# Patient Record
Sex: MALE | Race: WHITE | NOT HISPANIC OR LATINO | Employment: OTHER | URBAN - METROPOLITAN AREA
[De-identification: names, ages, dates, MRNs, and addresses within clinical notes are randomized per-mention and may not be internally consistent; named-entity substitution may affect disease eponyms.]

---

## 2022-01-27 ENCOUNTER — OFFICE VISIT (OUTPATIENT)
Dept: CARDIAC SURGERY | Facility: CLINIC | Age: 57
End: 2022-01-27

## 2022-01-27 VITALS
TEMPERATURE: 98.2 F | HEIGHT: 70 IN | DIASTOLIC BLOOD PRESSURE: 80 MMHG | RESPIRATION RATE: 20 BRPM | HEART RATE: 82 BPM | BODY MASS INDEX: 19.9 KG/M2 | SYSTOLIC BLOOD PRESSURE: 128 MMHG | OXYGEN SATURATION: 100 % | WEIGHT: 139 LBS

## 2022-01-27 DIAGNOSIS — G80.9 CEREBRAL PALSY, UNSPECIFIED TYPE: ICD-10-CM

## 2022-01-27 DIAGNOSIS — I20.8 CHRONIC STABLE ANGINA: ICD-10-CM

## 2022-01-27 DIAGNOSIS — I25.10 CORONARY ARTERY DISEASE INVOLVING NATIVE CORONARY ARTERY OF NATIVE HEART WITHOUT ANGINA PECTORIS: ICD-10-CM

## 2022-01-27 DIAGNOSIS — Z79.4 TYPE 2 DIABETES MELLITUS WITH DIABETIC PERIPHERAL ANGIOPATHY WITHOUT GANGRENE, WITH LONG-TERM CURRENT USE OF INSULIN: ICD-10-CM

## 2022-01-27 DIAGNOSIS — E11.51 TYPE 2 DIABETES MELLITUS WITH DIABETIC PERIPHERAL ANGIOPATHY WITHOUT GANGRENE, WITH LONG-TERM CURRENT USE OF INSULIN: ICD-10-CM

## 2022-01-27 DIAGNOSIS — I25.5 ISCHEMIC CARDIOMYOPATHY: Primary | ICD-10-CM

## 2022-01-27 PROBLEM — I20.89 CHRONIC STABLE ANGINA: Status: ACTIVE | Noted: 2022-01-27

## 2022-01-27 PROCEDURE — 99024 POSTOP FOLLOW-UP VISIT: CPT | Performed by: THORACIC SURGERY (CARDIOTHORACIC VASCULAR SURGERY)

## 2022-01-27 NOTE — PROGRESS NOTES
Dear Shon,    I saw Mr. Morse today in the office.  He is still very weak and is weak as a kitten.  I do think he needs bypass surgery but I would like for you to see him again in 2 weeks and I will see him again in 1 month.  He supposed to have a second Covid shot next week.  I hope we can get him in shape for surgery because in the long run this would be his best option.  However it could be long-term medical treatment.  We will just have to see how he responds to therapy.    I will see him back in a month.

## 2022-02-24 ENCOUNTER — OFFICE VISIT (OUTPATIENT)
Dept: CARDIAC SURGERY | Facility: CLINIC | Age: 57
End: 2022-02-24

## 2022-02-24 ENCOUNTER — PREP FOR SURGERY (OUTPATIENT)
Dept: OTHER | Facility: HOSPITAL | Age: 57
End: 2022-02-24

## 2022-02-24 ENCOUNTER — TELEPHONE (OUTPATIENT)
Dept: CARDIAC SURGERY | Facility: CLINIC | Age: 57
End: 2022-02-24

## 2022-02-24 VITALS
RESPIRATION RATE: 17 BRPM | SYSTOLIC BLOOD PRESSURE: 133 MMHG | BODY MASS INDEX: 20.76 KG/M2 | HEIGHT: 70 IN | WEIGHT: 145 LBS | TEMPERATURE: 97.8 F | HEART RATE: 92 BPM | OXYGEN SATURATION: 100 % | DIASTOLIC BLOOD PRESSURE: 74 MMHG

## 2022-02-24 DIAGNOSIS — I11.0 HYPERTENSIVE HEART DISEASE WITH HEART FAILURE: ICD-10-CM

## 2022-02-24 DIAGNOSIS — R79.9 ABNORMAL FINDING OF BLOOD CHEMISTRY, UNSPECIFIED: ICD-10-CM

## 2022-02-24 DIAGNOSIS — R79.1 ABNORMAL COAGULATION PROFILE: ICD-10-CM

## 2022-02-24 DIAGNOSIS — I63.89 OTHER CEREBRAL INFARCTION: ICD-10-CM

## 2022-02-24 DIAGNOSIS — I25.5 ISCHEMIC CARDIOMYOPATHY: Primary | ICD-10-CM

## 2022-02-24 DIAGNOSIS — I25.118 CORONARY ARTERY DISEASE OF NATIVE HEART WITH STABLE ANGINA PECTORIS, UNSPECIFIED VESSEL OR LESION TYPE: Primary | ICD-10-CM

## 2022-02-24 PROCEDURE — 99024 POSTOP FOLLOW-UP VISIT: CPT | Performed by: THORACIC SURGERY (CARDIOTHORACIC VASCULAR SURGERY)

## 2022-02-24 RX ORDER — CHLORHEXIDINE GLUCONATE 500 MG/1
1 CLOTH TOPICAL EVERY 12 HOURS PRN
Status: CANCELLED | OUTPATIENT
Start: 2022-02-24

## 2022-02-24 RX ORDER — CHLORHEXIDINE GLUCONATE 0.12 MG/ML
15 RINSE ORAL ONCE
Status: CANCELLED | OUTPATIENT
Start: 2022-02-24 | End: 2022-02-24

## 2022-02-24 RX ORDER — CEFAZOLIN SODIUM 2 G/100ML
2 INJECTION, SOLUTION INTRAVENOUS
Status: CANCELLED | OUTPATIENT
Start: 2022-02-25 | End: 2022-02-26

## 2022-02-24 RX ORDER — CHLORHEXIDINE GLUCONATE 0.12 MG/ML
15 RINSE ORAL EVERY 12 HOURS
Status: CANCELLED | OUTPATIENT
Start: 2022-02-24 | End: 2022-02-25

## 2022-02-24 NOTE — PROGRESS NOTES
I saw him today back in the office.  He has gained weight and is now near his baseline 145 pounds.  He has been exercising on the bicycle.  I think he is now ready for surgery and we will set this up for next week.  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  He had it when he was younger with some improvement.  He has a nonhealing ulcer on his right foot and I am going to have vascular see him.  We will plan for surgery next week.

## 2022-02-24 NOTE — TELEPHONE ENCOUNTER
Spoke to Gayla with PAT and surgery times. PAT is on 2- at 0815. Surgery on 3-2-2022 at 0730 arrival time is 0500. Expressed understanding.

## 2022-02-28 ENCOUNTER — HOSPITAL ENCOUNTER (OUTPATIENT)
Dept: GENERAL RADIOLOGY | Facility: HOSPITAL | Age: 57
Discharge: HOME | End: 2022-02-28

## 2022-02-28 ENCOUNTER — PRE-ADMISSION TESTING (OUTPATIENT)
Dept: PREADMISSION TESTING | Facility: HOSPITAL | Age: 57
End: 2022-02-28

## 2022-02-28 ENCOUNTER — HOSPITAL ENCOUNTER (OUTPATIENT)
Dept: CARDIOLOGY | Facility: HOSPITAL | Age: 57
Discharge: HOME | End: 2022-02-28

## 2022-02-28 ENCOUNTER — ANESTHESIA EVENT (OUTPATIENT)
Dept: PERIOP | Facility: HOSPITAL | Age: 57
End: 2022-02-28

## 2022-02-28 VITALS
DIASTOLIC BLOOD PRESSURE: 80 MMHG | HEART RATE: 82 BPM | HEIGHT: 70 IN | OXYGEN SATURATION: 100 % | BODY MASS INDEX: 20.33 KG/M2 | SYSTOLIC BLOOD PRESSURE: 135 MMHG | RESPIRATION RATE: 16 BRPM | WEIGHT: 142 LBS | TEMPERATURE: 96.6 F

## 2022-02-28 DIAGNOSIS — I25.118 CORONARY ARTERY DISEASE OF NATIVE HEART WITH STABLE ANGINA PECTORIS, UNSPECIFIED VESSEL OR LESION TYPE: ICD-10-CM

## 2022-02-28 DIAGNOSIS — I11.0 HYPERTENSIVE HEART DISEASE WITH HEART FAILURE: ICD-10-CM

## 2022-02-28 DIAGNOSIS — I63.89 OTHER CEREBRAL INFARCTION: ICD-10-CM

## 2022-02-28 DIAGNOSIS — R79.9 ABNORMAL FINDING OF BLOOD CHEMISTRY, UNSPECIFIED: ICD-10-CM

## 2022-02-28 DIAGNOSIS — R79.1 ABNORMAL COAGULATION PROFILE: ICD-10-CM

## 2022-02-28 LAB
ABO GROUP BLD: NORMAL
ALBUMIN SERPL-MCNC: 3.7 G/DL (ref 3.5–5.2)
ALBUMIN/GLOB SERPL: 1 G/DL
ALP SERPL-CCNC: 85 U/L (ref 39–117)
ALT SERPL W P-5'-P-CCNC: 15 U/L (ref 1–41)
ANION GAP SERPL CALCULATED.3IONS-SCNC: 10.1 MMOL/L (ref 5–15)
APTT PPP: 27.4 SECONDS (ref 22.7–35.4)
ARTERIAL PATENCY WRIST A: ABNORMAL
AST SERPL-CCNC: 10 U/L (ref 1–40)
ATMOSPHERIC PRESS: 761 MMHG
BACTERIA UR QL AUTO: ABNORMAL /HPF
BASE EXCESS BLDA CALC-SCNC: -1.4 MMOL/L (ref 0–2)
BASOPHILS # BLD AUTO: 0.1 10*3/MM3 (ref 0–0.2)
BASOPHILS NFR BLD AUTO: 1.1 % (ref 0–1.5)
BDY SITE: ABNORMAL
BH CV XLRA MEAS - DIST GSV CALF DIST LEFT: 0.23 CM
BH CV XLRA MEAS - DIST GSV CALF DIST RIGHT: 0.37 CM
BH CV XLRA MEAS - DIST GSV THIGH DIST LEFT: 0.24 CM
BH CV XLRA MEAS - DIST GSV THIGH DIST RIGHT: 0.37 CM
BH CV XLRA MEAS - GSV ANKLE DIST LEFT: 0.22 CM
BH CV XLRA MEAS - GSV ANKLE DIST RIGHT: 0.43 CM
BH CV XLRA MEAS - GSV KNEE DIST LEFT: 0.28 CM
BH CV XLRA MEAS - GSV KNEE DIST RIGHT: 0.34 CM
BH CV XLRA MEAS - GSV ORIGIN DIST LEFT: 0.32 CM
BH CV XLRA MEAS - GSV ORIGIN DIST RIGHT: 0.47 CM
BH CV XLRA MEAS - MID GSV CALF LEFT: 0.21 CM
BH CV XLRA MEAS - MID GSV CALF RIGHT: 0.39 CM
BH CV XLRA MEAS - MID GSV THIGH  LEFT: 0.27 CM
BH CV XLRA MEAS - MID GSV THIGH  RIGHT: 0.4 CM
BH CV XLRA MEAS - PROX GSV CALF DIST LEFT: 0.24 CM
BH CV XLRA MEAS - PROX GSV CALF DIST RIGHT: 0.38 CM
BH CV XLRA MEAS - PROX GSV THIGH  LEFT: 0.32 CM
BH CV XLRA MEAS - PROX GSV THIGH  RIGHT: 0.39 CM
BH CV XLRA MEAS LEFT DIST CCA EDV: -25.2 CM/SEC
BH CV XLRA MEAS LEFT DIST CCA PSV: -97.3 CM/SEC
BH CV XLRA MEAS LEFT DIST ICA EDV: -28.1 CM/SEC
BH CV XLRA MEAS LEFT DIST ICA PSV: -83.3 CM/SEC
BH CV XLRA MEAS LEFT ICA/CCA RATIO: 1.23
BH CV XLRA MEAS LEFT MID ICA EDV: -36.4 CM/SEC
BH CV XLRA MEAS LEFT MID ICA PSV: -93.8 CM/SEC
BH CV XLRA MEAS LEFT PROX CCA EDV: 32.4 CM/SEC
BH CV XLRA MEAS LEFT PROX CCA PSV: 134 CM/SEC
BH CV XLRA MEAS LEFT PROX ECA EDV: -18.9 CM/SEC
BH CV XLRA MEAS LEFT PROX ECA PSV: -124 CM/SEC
BH CV XLRA MEAS LEFT PROX ICA EDV: -38.5 CM/SEC
BH CV XLRA MEAS LEFT PROX ICA PSV: -120 CM/SEC
BH CV XLRA MEAS LEFT PROX SCLA PSV: 177 CM/SEC
BH CV XLRA MEAS LEFT VERTEBRAL A EDV: 18.5 CM/SEC
BH CV XLRA MEAS LEFT VERTEBRAL A PSV: 66.8 CM/SEC
BH CV XLRA MEAS RIGHT DIST CCA EDV: 24 CM/SEC
BH CV XLRA MEAS RIGHT DIST CCA PSV: 90.9 CM/SEC
BH CV XLRA MEAS RIGHT DIST ICA EDV: -40.8 CM/SEC
BH CV XLRA MEAS RIGHT DIST ICA PSV: -110 CM/SEC
BH CV XLRA MEAS RIGHT ICA/CCA RATIO: 1.21
BH CV XLRA MEAS RIGHT MID ICA EDV: -35.4 CM/SEC
BH CV XLRA MEAS RIGHT MID ICA PSV: -109 CM/SEC
BH CV XLRA MEAS RIGHT PROX CCA EDV: 27 CM/SEC
BH CV XLRA MEAS RIGHT PROX CCA PSV: 105 CM/SEC
BH CV XLRA MEAS RIGHT PROX ECA EDV: -25.5 CM/SEC
BH CV XLRA MEAS RIGHT PROX ECA PSV: -169 CM/SEC
BH CV XLRA MEAS RIGHT PROX ICA EDV: -28.3 CM/SEC
BH CV XLRA MEAS RIGHT PROX ICA PSV: -109 CM/SEC
BH CV XLRA MEAS RIGHT PROX SCLA PSV: 140 CM/SEC
BH CV XLRA MEAS RIGHT VERTEBRAL A EDV: 9.3 CM/SEC
BH CV XLRA MEAS RIGHT VERTEBRAL A PSV: 35.9 CM/SEC
BILIRUB SERPL-MCNC: 0.3 MG/DL (ref 0–1.2)
BILIRUB UR QL STRIP: NEGATIVE
BLD GP AB SCN SERPL QL: NEGATIVE
BUN SERPL-MCNC: 20 MG/DL (ref 6–20)
BUN/CREAT SERPL: 20.2 (ref 7–25)
CALCIUM SPEC-SCNC: 9 MG/DL (ref 8.6–10.5)
CHLORIDE SERPL-SCNC: 102 MMOL/L (ref 98–107)
CHOLEST SERPL-MCNC: 128 MG/DL (ref 0–200)
CLARITY UR: ABNORMAL
CLOSE TME COLL+ADP + EPINEP PNL BLD: 97 % (ref 86–100)
CO2 SERPL-SCNC: 23.9 MMOL/L (ref 22–29)
COLOR UR: YELLOW
CREAT SERPL-MCNC: 0.99 MG/DL (ref 0.76–1.27)
DEPRECATED RDW RBC AUTO: 40.3 FL (ref 37–54)
EGFRCR SERPLBLD CKD-EPI 2021: 90 ML/MIN/1.73
EOSINOPHIL # BLD AUTO: 0.17 10*3/MM3 (ref 0–0.4)
EOSINOPHIL NFR BLD AUTO: 1.8 % (ref 0.3–6.2)
ERYTHROCYTE [DISTWIDTH] IN BLOOD BY AUTOMATED COUNT: 12.7 % (ref 12.3–15.4)
GLOBULIN UR ELPH-MCNC: 3.7 GM/DL
GLUCOSE SERPL-MCNC: 173 MG/DL (ref 65–99)
GLUCOSE UR STRIP-MCNC: NEGATIVE MG/DL
HBA1C MFR BLD: 8.6 % (ref 4.8–5.6)
HCO3 BLDA-SCNC: 22 MMOL/L (ref 22–28)
HCT VFR BLD AUTO: 30.7 % (ref 37.5–51)
HDLC SERPL-MCNC: 39 MG/DL (ref 40–60)
HGB BLD-MCNC: 10.4 G/DL (ref 13–17.7)
HGB UR QL STRIP.AUTO: ABNORMAL
HYALINE CASTS UR QL AUTO: ABNORMAL /LPF
IMM GRANULOCYTES # BLD AUTO: 0.03 10*3/MM3 (ref 0–0.05)
IMM GRANULOCYTES NFR BLD AUTO: 0.3 % (ref 0–0.5)
INR PPP: 0.97 (ref 0.9–1.1)
KETONES UR QL STRIP: NEGATIVE
LDLC SERPL CALC-MCNC: 72 MG/DL (ref 0–100)
LDLC/HDLC SERPL: 1.84 {RATIO}
LEFT ARM BP: NORMAL MMHG
LEUKOCYTE ESTERASE UR QL STRIP.AUTO: ABNORMAL
LYMPHOCYTES # BLD AUTO: 1.89 10*3/MM3 (ref 0.7–3.1)
LYMPHOCYTES NFR BLD AUTO: 20.1 % (ref 19.6–45.3)
MAGNESIUM SERPL-MCNC: 2.3 MG/DL (ref 1.6–2.6)
MAXIMAL PREDICTED HEART RATE: 165 BPM
MAXIMAL PREDICTED HEART RATE: 165 BPM
MCH RBC QN AUTO: 30 PG (ref 26.6–33)
MCHC RBC AUTO-ENTMCNC: 33.9 G/DL (ref 31.5–35.7)
MCV RBC AUTO: 88.5 FL (ref 79–97)
MODALITY: ABNORMAL
MONOCYTES # BLD AUTO: 0.62 10*3/MM3 (ref 0.1–0.9)
MONOCYTES NFR BLD AUTO: 6.6 % (ref 5–12)
NEUTROPHILS NFR BLD AUTO: 6.57 10*3/MM3 (ref 1.7–7)
NEUTROPHILS NFR BLD AUTO: 70.1 % (ref 42.7–76)
NITRITE UR QL STRIP: NEGATIVE
NRBC BLD AUTO-RTO: 0 /100 WBC (ref 0–0.2)
NT-PROBNP SERPL-MCNC: 91.6 PG/ML (ref 0–900)
PCO2 BLDA: 31.4 MM HG (ref 35–45)
PH BLDA: 7.45 PH UNITS (ref 7.35–7.45)
PH UR STRIP.AUTO: <=5 [PH] (ref 5–8)
PLATELET # BLD AUTO: 371 10*3/MM3 (ref 140–450)
PMV BLD AUTO: 8.8 FL (ref 6–12)
PO2 BLDA: 83.5 MM HG (ref 80–100)
POTASSIUM SERPL-SCNC: 4.6 MMOL/L (ref 3.5–5.2)
PROT SERPL-MCNC: 7.4 G/DL (ref 6–8.5)
PROT UR QL STRIP: ABNORMAL
PROTHROMBIN TIME: 12.8 SECONDS (ref 11.7–14.2)
QT INTERVAL: 394 MS
RBC # BLD AUTO: 3.47 10*6/MM3 (ref 4.14–5.8)
RBC # UR STRIP: ABNORMAL /HPF
REF LAB TEST METHOD: ABNORMAL
RH BLD: POSITIVE
RIGHT ARM BP: NORMAL MMHG
SAO2 % BLDCOA: 96.9 % (ref 92–99)
SARS-COV-2 ORF1AB RESP QL NAA+PROBE: NOT DETECTED
SODIUM SERPL-SCNC: 136 MMOL/L (ref 136–145)
SP GR UR STRIP: 1.01 (ref 1–1.03)
SQUAMOUS #/AREA URNS HPF: ABNORMAL /HPF
STRESS TARGET HR: 140 BPM
STRESS TARGET HR: 140 BPM
T&S EXPIRATION DATE: NORMAL
TOTAL RATE: 16 BREATHS/MINUTE
TRIGL SERPL-MCNC: 86 MG/DL (ref 0–150)
UROBILINOGEN UR QL STRIP: ABNORMAL
VLDLC SERPL-MCNC: 17 MG/DL (ref 5–40)
WBC # UR STRIP: ABNORMAL /HPF
WBC NRBC COR # BLD: 9.38 10*3/MM3 (ref 3.4–10.8)
YEAST URNS QL MICRO: ABNORMAL /HPF

## 2022-02-28 PROCEDURE — 87086 URINE CULTURE/COLONY COUNT: CPT

## 2022-02-28 PROCEDURE — 93880 EXTRACRANIAL BILAT STUDY: CPT

## 2022-02-28 PROCEDURE — 80053 COMPREHEN METABOLIC PANEL: CPT

## 2022-02-28 PROCEDURE — 86920 COMPATIBILITY TEST SPIN: CPT

## 2022-02-28 PROCEDURE — 80061 LIPID PANEL: CPT

## 2022-02-28 PROCEDURE — 93970 EXTREMITY STUDY: CPT

## 2022-02-28 PROCEDURE — 83036 HEMOGLOBIN GLYCOSYLATED A1C: CPT

## 2022-02-28 PROCEDURE — 71046 X-RAY EXAM CHEST 2 VIEWS: CPT

## 2022-02-28 PROCEDURE — 86900 BLOOD TYPING SEROLOGIC ABO: CPT

## 2022-02-28 PROCEDURE — 86901 BLOOD TYPING SEROLOGIC RH(D): CPT

## 2022-02-28 PROCEDURE — 85025 COMPLETE CBC W/AUTO DIFF WBC: CPT

## 2022-02-28 PROCEDURE — 83880 ASSAY OF NATRIURETIC PEPTIDE: CPT

## 2022-02-28 PROCEDURE — U0004 COV-19 TEST NON-CDC HGH THRU: HCPCS

## 2022-02-28 PROCEDURE — 85610 PROTHROMBIN TIME: CPT

## 2022-02-28 PROCEDURE — 83735 ASSAY OF MAGNESIUM: CPT

## 2022-02-28 PROCEDURE — 93010 ELECTROCARDIOGRAM REPORT: CPT | Performed by: INTERNAL MEDICINE

## 2022-02-28 PROCEDURE — 82803 BLOOD GASES ANY COMBINATION: CPT | Performed by: INTERNAL MEDICINE

## 2022-02-28 PROCEDURE — 81001 URINALYSIS AUTO W/SCOPE: CPT

## 2022-02-28 PROCEDURE — 86850 RBC ANTIBODY SCREEN: CPT

## 2022-02-28 PROCEDURE — 36415 COLL VENOUS BLD VENIPUNCTURE: CPT

## 2022-02-28 PROCEDURE — C9803 HOPD COVID-19 SPEC COLLECT: HCPCS

## 2022-02-28 PROCEDURE — 36600 WITHDRAWAL OF ARTERIAL BLOOD: CPT | Performed by: INTERNAL MEDICINE

## 2022-02-28 PROCEDURE — 85576 BLOOD PLATELET AGGREGATION: CPT

## 2022-02-28 PROCEDURE — 93005 ELECTROCARDIOGRAM TRACING: CPT

## 2022-02-28 PROCEDURE — 94799 UNLISTED PULMONARY SVC/PX: CPT

## 2022-02-28 PROCEDURE — 85730 THROMBOPLASTIN TIME PARTIAL: CPT

## 2022-02-28 RX ORDER — AMLODIPINE BESYLATE 5 MG/1
5 TABLET ORAL NIGHTLY
COMMUNITY
End: 2022-03-07 | Stop reason: HOSPADM

## 2022-02-28 RX ORDER — FLUOXETINE HYDROCHLORIDE 40 MG/1
40 CAPSULE ORAL DAILY
COMMUNITY
Start: 2022-02-24 | End: 2022-02-28

## 2022-02-28 RX ORDER — CHLORHEXIDINE GLUCONATE 0.12 MG/ML
15 RINSE ORAL
COMMUNITY
End: 2022-03-07 | Stop reason: HOSPADM

## 2022-02-28 RX ORDER — CHLORHEXIDINE GLUCONATE 500 MG/1
1 CLOTH TOPICAL EVERY 12 HOURS PRN
Status: ACTIVE | OUTPATIENT
Start: 2022-02-28

## 2022-02-28 RX ORDER — AMLODIPINE BESYLATE 5 MG/1
5 TABLET ORAL DAILY
COMMUNITY
Start: 2022-02-23 | End: 2022-02-28

## 2022-02-28 RX ORDER — ATORVASTATIN CALCIUM 40 MG/1
TABLET, FILM COATED ORAL
COMMUNITY
End: 2022-02-28

## 2022-02-28 RX ORDER — CARVEDILOL 6.25 MG/1
6.25 TABLET ORAL 2 TIMES DAILY WITH MEALS
COMMUNITY
Start: 2022-02-06 | End: 2022-03-07 | Stop reason: HOSPADM

## 2022-02-28 RX ORDER — AMLODIPINE BESYLATE 10 MG/1
10 TABLET ORAL NIGHTLY
COMMUNITY
End: 2022-03-07 | Stop reason: HOSPADM

## 2022-02-28 RX ORDER — INSULIN ASPART 100 [IU]/ML
10 INJECTION, SOLUTION INTRAVENOUS; SUBCUTANEOUS
COMMUNITY

## 2022-02-28 RX ORDER — INSULIN GLARGINE 100 [IU]/ML
INJECTION, SOLUTION SUBCUTANEOUS EVERY 12 HOURS SCHEDULED
COMMUNITY
End: 2022-02-28

## 2022-02-28 RX ORDER — SULFAMETHOXAZOLE AND TRIMETHOPRIM 800; 160 MG/1; MG/1
1 TABLET ORAL 2 TIMES DAILY
Qty: 6 TABLET | Refills: 0 | Status: SHIPPED | OUTPATIENT
Start: 2022-02-28 | End: 2022-03-07 | Stop reason: HOSPADM

## 2022-02-28 RX ORDER — CARVEDILOL 6.25 MG/1
TABLET ORAL EVERY 12 HOURS SCHEDULED
COMMUNITY
End: 2022-02-28

## 2022-02-28 RX ORDER — AMLODIPINE BESYLATE 5 MG/1
TABLET ORAL
COMMUNITY
End: 2022-02-28

## 2022-02-28 RX ORDER — CHLORHEXIDINE GLUCONATE 0.12 MG/ML
15 RINSE ORAL EVERY 12 HOURS
Status: DISPENSED | OUTPATIENT
Start: 2022-02-28 | End: 2022-03-01

## 2022-03-01 ENCOUNTER — TELEPHONE (OUTPATIENT)
Dept: CARDIAC SURGERY | Facility: CLINIC | Age: 57
End: 2022-03-01

## 2022-03-01 LAB — BACTERIA SPEC AEROBE CULT: ABNORMAL

## 2022-03-01 NOTE — TELEPHONE ENCOUNTER
Spoke to Gayla with surgery time change. Patient is now a to follow case so his arrival time will be 0800. Expressed understanding.

## 2022-03-02 ENCOUNTER — HOSPITAL ENCOUNTER (INPATIENT)
Facility: HOSPITAL | Age: 57
LOS: 5 days | Discharge: HOME HEALTH | End: 2022-03-07
Attending: THORACIC SURGERY (CARDIOTHORACIC VASCULAR SURGERY) | Admitting: THORACIC SURGERY (CARDIOTHORACIC VASCULAR SURGERY)

## 2022-03-02 ENCOUNTER — ANCILLARY PROCEDURE (OUTPATIENT)
Dept: PERIOP | Facility: HOSPITAL | Age: 57
End: 2022-03-02

## 2022-03-02 ENCOUNTER — ANESTHESIA (OUTPATIENT)
Dept: PERIOP | Facility: HOSPITAL | Age: 57
End: 2022-03-02

## 2022-03-02 ENCOUNTER — APPOINTMENT (OUTPATIENT)
Dept: GENERAL RADIOLOGY | Facility: HOSPITAL | Age: 57
End: 2022-03-02

## 2022-03-02 DIAGNOSIS — D58.2 ABNORMAL HEMOGLOBIN: ICD-10-CM

## 2022-03-02 DIAGNOSIS — I25.118 CORONARY ARTERY DISEASE OF NATIVE HEART WITH STABLE ANGINA PECTORIS, UNSPECIFIED VESSEL OR LESION TYPE: ICD-10-CM

## 2022-03-02 DIAGNOSIS — Z95.1 S/P CABG (CORONARY ARTERY BYPASS GRAFT): Primary | ICD-10-CM

## 2022-03-02 LAB
ACT BLD: 106 SECONDS (ref 82–152)
ACT BLD: 142 SECONDS (ref 82–152)
ACT BLD: 362 SECONDS (ref 82–152)
ACT BLD: 422 SECONDS (ref 82–152)
ACT BLD: 481 SECONDS (ref 82–152)
ACT BLD: 600 SECONDS (ref 82–152)
ALBUMIN SERPL-MCNC: 4.1 G/DL (ref 3.5–5.2)
ALBUMIN SERPL-MCNC: 4.3 G/DL (ref 3.5–5.2)
ANION GAP SERPL CALCULATED.3IONS-SCNC: 12 MMOL/L (ref 5–15)
ANION GAP SERPL CALCULATED.3IONS-SCNC: 13 MMOL/L (ref 5–15)
APTT PPP: 26.8 SECONDS (ref 22.7–35.4)
ARTERIAL PATENCY WRIST A: ABNORMAL
ATMOSPHERIC PRESS: 753.4 MMHG
ATMOSPHERIC PRESS: 753.9 MMHG
ATMOSPHERIC PRESS: 754.8 MMHG
BASE EXCESS BLDA CALC-SCNC: -1 MMOL/L (ref -5–5)
BASE EXCESS BLDA CALC-SCNC: -1 MMOL/L (ref -5–5)
BASE EXCESS BLDA CALC-SCNC: -2 MMOL/L (ref -5–5)
BASE EXCESS BLDA CALC-SCNC: -2.7 MMOL/L (ref 0–2)
BASE EXCESS BLDA CALC-SCNC: -4.3 MMOL/L (ref 0–2)
BASE EXCESS BLDA CALC-SCNC: 0 MMOL/L (ref -5–5)
BASE EXCESS BLDA CALC-SCNC: 0.6 MMOL/L (ref 0–2)
BASE EXCESS BLDA CALC-SCNC: 3 MMOL/L (ref -5–5)
BASE EXCESS BLDA CALC-SCNC: 5 MMOL/L (ref -5–5)
BASOPHILS # BLD AUTO: 0.06 10*3/MM3 (ref 0–0.2)
BASOPHILS NFR BLD AUTO: 0.4 % (ref 0–1.5)
BDY SITE: ABNORMAL
BUN SERPL-MCNC: 26 MG/DL (ref 6–20)
BUN SERPL-MCNC: 26 MG/DL (ref 6–20)
BUN/CREAT SERPL: 21.8 (ref 7–25)
BUN/CREAT SERPL: 22.8 (ref 7–25)
CA-I BLD-MCNC: 5.7 MG/DL (ref 4.6–5.4)
CA-I BLDA-SCNC: ABNORMAL MMOL/L
CA-I SERPL ISE-MCNC: 1.43 MMOL/L (ref 1.15–1.35)
CALCIUM SPEC-SCNC: 9.3 MG/DL (ref 8.6–10.5)
CALCIUM SPEC-SCNC: 9.7 MG/DL (ref 8.6–10.5)
CHLORIDE SERPL-SCNC: 108 MMOL/L (ref 98–107)
CHLORIDE SERPL-SCNC: 111 MMOL/L (ref 98–107)
CO2 BLDA-SCNC: 24 MMOL/L (ref 24–29)
CO2 BLDA-SCNC: 25 MMOL/L (ref 24–29)
CO2 BLDA-SCNC: 26 MMOL/L (ref 24–29)
CO2 BLDA-SCNC: 26 MMOL/L (ref 24–29)
CO2 BLDA-SCNC: 29 MMOL/L (ref 24–29)
CO2 BLDA-SCNC: 31 MMOL/L (ref 24–29)
CO2 SERPL-SCNC: 19 MMOL/L (ref 22–29)
CO2 SERPL-SCNC: 25 MMOL/L (ref 22–29)
CREAT SERPL-MCNC: 1.14 MG/DL (ref 0.76–1.27)
CREAT SERPL-MCNC: 1.19 MG/DL (ref 0.76–1.27)
DEPRECATED RDW RBC AUTO: 42.9 FL (ref 37–54)
DEPRECATED RDW RBC AUTO: 44.4 FL (ref 37–54)
EGFRCR SERPLBLD CKD-EPI 2021: 72.1 ML/MIN/1.73
EGFRCR SERPLBLD CKD-EPI 2021: 76 ML/MIN/1.73
EOSINOPHIL # BLD AUTO: 0.26 10*3/MM3 (ref 0–0.4)
EOSINOPHIL NFR BLD AUTO: 1.7 % (ref 0.3–6.2)
ERYTHROCYTE [DISTWIDTH] IN BLOOD BY AUTOMATED COUNT: 13.3 % (ref 12.3–15.4)
ERYTHROCYTE [DISTWIDTH] IN BLOOD BY AUTOMATED COUNT: 13.7 % (ref 12.3–15.4)
FIBRINOGEN PPP-MCNC: 327 MG/DL (ref 219–464)
GLUCOSE BLDC GLUCOMTR-MCNC: 100 MG/DL (ref 70–130)
GLUCOSE BLDC GLUCOMTR-MCNC: 101 MG/DL (ref 70–130)
GLUCOSE BLDC GLUCOMTR-MCNC: 109 MG/DL (ref 70–130)
GLUCOSE BLDC GLUCOMTR-MCNC: 115 MG/DL (ref 70–130)
GLUCOSE BLDC GLUCOMTR-MCNC: 119 MG/DL (ref 70–130)
GLUCOSE BLDC GLUCOMTR-MCNC: 127 MG/DL (ref 70–130)
GLUCOSE BLDC GLUCOMTR-MCNC: 132 MG/DL (ref 70–130)
GLUCOSE BLDC GLUCOMTR-MCNC: 140 MG/DL (ref 70–130)
GLUCOSE BLDC GLUCOMTR-MCNC: 147 MG/DL (ref 70–130)
GLUCOSE BLDC GLUCOMTR-MCNC: 162 MG/DL (ref 70–130)
GLUCOSE BLDC GLUCOMTR-MCNC: 174 MG/DL (ref 70–130)
GLUCOSE BLDC GLUCOMTR-MCNC: 195 MG/DL (ref 70–130)
GLUCOSE BLDC GLUCOMTR-MCNC: 195 MG/DL (ref 70–130)
GLUCOSE BLDC GLUCOMTR-MCNC: 54 MG/DL (ref 70–130)
GLUCOSE BLDC GLUCOMTR-MCNC: 60 MG/DL (ref 70–130)
GLUCOSE BLDC GLUCOMTR-MCNC: 70 MG/DL (ref 70–130)
GLUCOSE BLDC GLUCOMTR-MCNC: 91 MG/DL (ref 70–130)
GLUCOSE BLDC GLUCOMTR-MCNC: 94 MG/DL (ref 70–130)
GLUCOSE BLDC GLUCOMTR-MCNC: 99 MG/DL (ref 70–130)
GLUCOSE SERPL-MCNC: 123 MG/DL (ref 65–99)
GLUCOSE SERPL-MCNC: 190 MG/DL (ref 65–99)
HCO3 BLDA-SCNC: 21.3 MMOL/L (ref 22–28)
HCO3 BLDA-SCNC: 22.1 MMOL/L (ref 22–28)
HCO3 BLDA-SCNC: 23.1 MMOL/L (ref 22–26)
HCO3 BLDA-SCNC: 23.9 MMOL/L (ref 22–26)
HCO3 BLDA-SCNC: 24.4 MMOL/L (ref 22–26)
HCO3 BLDA-SCNC: 24.7 MMOL/L (ref 22–26)
HCO3 BLDA-SCNC: 26.7 MMOL/L (ref 22–28)
HCO3 BLDA-SCNC: 27.7 MMOL/L (ref 22–26)
HCO3 BLDA-SCNC: 29.8 MMOL/L (ref 22–26)
HCT VFR BLD AUTO: 30.9 % (ref 37.5–51)
HCT VFR BLD AUTO: 31.2 % (ref 37.5–51)
HCT VFR BLDA CALC: 20 % (ref 38–51)
HCT VFR BLDA CALC: 24 % (ref 38–51)
HGB BLD-MCNC: 10.2 G/DL (ref 13–17.7)
HGB BLD-MCNC: 10.8 G/DL (ref 13–17.7)
HGB BLDA-MCNC: 6.8 G/DL (ref 12–17)
HGB BLDA-MCNC: 8.2 G/DL (ref 12–17)
IMM GRANULOCYTES # BLD AUTO: 0.09 10*3/MM3 (ref 0–0.05)
IMM GRANULOCYTES NFR BLD AUTO: 0.6 % (ref 0–0.5)
INHALED O2 CONCENTRATION: 100 %
INHALED O2 CONCENTRATION: 40 %
INHALED O2 CONCENTRATION: 40 %
INR PPP: 1.24 (ref 0.9–1.1)
LYMPHOCYTES # BLD AUTO: 0.93 10*3/MM3 (ref 0.7–3.1)
LYMPHOCYTES NFR BLD AUTO: 6 % (ref 19.6–45.3)
MAGNESIUM SERPL-MCNC: 3.1 MG/DL (ref 1.6–2.6)
MAGNESIUM SERPL-MCNC: 3.5 MG/DL (ref 1.6–2.6)
MCH RBC QN AUTO: 29.7 PG (ref 26.6–33)
MCH RBC QN AUTO: 30.6 PG (ref 26.6–33)
MCHC RBC AUTO-ENTMCNC: 33 G/DL (ref 31.5–35.7)
MCHC RBC AUTO-ENTMCNC: 34.6 G/DL (ref 31.5–35.7)
MCV RBC AUTO: 88.4 FL (ref 79–97)
MCV RBC AUTO: 90.1 FL (ref 79–97)
MODALITY: ABNORMAL
MONOCYTES # BLD AUTO: 0.73 10*3/MM3 (ref 0.1–0.9)
MONOCYTES NFR BLD AUTO: 4.7 % (ref 5–12)
NEUTROPHILS NFR BLD AUTO: 13.54 10*3/MM3 (ref 1.7–7)
NEUTROPHILS NFR BLD AUTO: 86.6 % (ref 42.7–76)
NRBC BLD AUTO-RTO: 0 /100 WBC (ref 0–0.2)
O2 A-A PPRESDIFF RESPIRATORY: 0.4 MMHG
O2 A-A PPRESDIFF RESPIRATORY: 0.4 MMHG
O2 A-A PPRESDIFF RESPIRATORY: 0.5 MMHG
PCO2 BLDA: 33.1 MM HG (ref 35–45)
PCO2 BLDA: 37.5 MM HG (ref 35–45)
PCO2 BLDA: 38.6 MM HG (ref 35–45)
PCO2 BLDA: 41.3 MM HG (ref 35–45)
PCO2 BLDA: 41.8 MM HG (ref 35–45)
PCO2 BLDA: 45.4 MM HG (ref 35–45)
PCO2 BLDA: 45.8 MM HG (ref 35–45)
PCO2 BLDA: 46.6 MM HG (ref 35–45)
PCO2 BLDA: 49 MM HG (ref 35–45)
PEEP RESPIRATORY: 5 CM[H2O]
PH BLDA: 7.29 PH UNITS (ref 7.35–7.45)
PH BLDA: 7.34 PH UNITS (ref 7.35–7.45)
PH BLDA: 7.39 PH UNITS (ref 7.35–7.6)
PH BLDA: 7.41 PH UNITS (ref 7.35–7.6)
PH BLDA: 7.41 PH UNITS (ref 7.35–7.6)
PH BLDA: 7.42 PH UNITS (ref 7.35–7.45)
PH BLDA: 7.42 PH UNITS (ref 7.35–7.6)
PH BLDA: 7.43 PH UNITS (ref 7.35–7.6)
PH BLDA: 7.46 PH UNITS (ref 7.35–7.6)
PHOSPHATE SERPL-MCNC: 3 MG/DL (ref 2.5–4.5)
PHOSPHATE SERPL-MCNC: 4.1 MG/DL (ref 2.5–4.5)
PLATELET # BLD AUTO: 208 10*3/MM3 (ref 140–450)
PLATELET # BLD AUTO: 225 10*3/MM3 (ref 140–450)
PMV BLD AUTO: 8.9 FL (ref 6–12)
PMV BLD AUTO: 9 FL (ref 6–12)
PO2 BLDA: 103.6 MM HG (ref 80–100)
PO2 BLDA: 107.8 MM HG (ref 80–100)
PO2 BLDA: 386.1 MM HG (ref 80–100)
PO2 BLDA: 389 MMHG (ref 80–105)
PO2 BLDA: 424 MMHG (ref 80–105)
PO2 BLDA: 456 MMHG (ref 80–105)
PO2 BLDA: 459 MMHG (ref 80–105)
PO2 BLDA: 47 MMHG (ref 80–105)
PO2 BLDA: 495 MMHG (ref 80–105)
POTASSIUM BLDA-SCNC: 4 MMOL/L (ref 3.5–4.9)
POTASSIUM BLDA-SCNC: 4.1 MMOL/L (ref 3.5–4.9)
POTASSIUM BLDA-SCNC: 4.8 MMOL/L (ref 3.5–4.9)
POTASSIUM BLDA-SCNC: 4.9 MMOL/L (ref 3.5–4.9)
POTASSIUM BLDA-SCNC: 5.1 MMOL/L (ref 3.5–4.9)
POTASSIUM BLDA-SCNC: 5.4 MMOL/L (ref 3.5–4.9)
POTASSIUM SERPL-SCNC: 4.3 MMOL/L (ref 3.5–5.2)
POTASSIUM SERPL-SCNC: 4.9 MMOL/L (ref 3.5–5.2)
PROTHROMBIN TIME: 15.6 SECONDS (ref 11.7–14.2)
PSV: 8 CMH2O
PSV: 8 CMH2O
QT INTERVAL: 409 MS
RBC # BLD AUTO: 3.43 10*6/MM3 (ref 4.14–5.8)
RBC # BLD AUTO: 3.53 10*6/MM3 (ref 4.14–5.8)
SAO2 % BLDA: 100 % (ref 95–98)
SAO2 % BLDA: 81 % (ref 95–98)
SAO2 % BLDCOA: 100 % (ref 92–99)
SAO2 % BLDCOA: 97.2 % (ref 92–99)
SAO2 % BLDCOA: 97.8 % (ref 92–99)
SET MECH RESP RATE: 14
SET MECH RESP RATE: 24
SODIUM SERPL-SCNC: 140 MMOL/L (ref 136–145)
SODIUM SERPL-SCNC: 148 MMOL/L (ref 136–145)
TOTAL RATE: 14 BREATHS/MINUTE
TOTAL RATE: 16 BREATHS/MINUTE
VENTILATOR MODE: ABNORMAL
VT ON VENT VENT: 600 ML
VT ON VENT VENT: 700 ML
WBC NRBC COR # BLD: 15.43 10*3/MM3 (ref 3.4–10.8)
WBC NRBC COR # BLD: 15.61 10*3/MM3 (ref 3.4–10.8)

## 2022-03-02 PROCEDURE — 25010000002 HEPARIN (PORCINE) PER 1000 UNITS

## 2022-03-02 PROCEDURE — A4648 IMPLANTABLE TISSUE MARKER: HCPCS | Performed by: THORACIC SURGERY (CARDIOTHORACIC VASCULAR SURGERY)

## 2022-03-02 PROCEDURE — 25010000002 DEXAMETHASONE PER 1 MG: Performed by: STUDENT IN AN ORGANIZED HEALTH CARE EDUCATION/TRAINING PROGRAM

## 2022-03-02 PROCEDURE — 25010000002 ALBUMIN HUMAN 25% PER 50 ML

## 2022-03-02 PROCEDURE — 0 CEFAZOLIN IN DEXTROSE 2-4 GM/100ML-% SOLUTION: Performed by: NURSE PRACTITIONER

## 2022-03-02 PROCEDURE — 25010000002 MIDAZOLAM PER 1 MG

## 2022-03-02 PROCEDURE — 85730 THROMBOPLASTIN TIME PARTIAL: CPT | Performed by: NURSE PRACTITIONER

## 2022-03-02 PROCEDURE — 93005 ELECTROCARDIOGRAM TRACING: CPT | Performed by: NURSE PRACTITIONER

## 2022-03-02 PROCEDURE — 25010000002 HEPARIN (PORCINE) PER 1000 UNITS: Performed by: THORACIC SURGERY (CARDIOTHORACIC VASCULAR SURGERY)

## 2022-03-02 PROCEDURE — 36430 TRANSFUSION BLD/BLD COMPNT: CPT

## 2022-03-02 PROCEDURE — 25010000002 ONDANSETRON PER 1 MG: Performed by: STUDENT IN AN ORGANIZED HEALTH CARE EDUCATION/TRAINING PROGRAM

## 2022-03-02 PROCEDURE — 33534 CABG ARTERIAL TWO: CPT | Performed by: THORACIC SURGERY (CARDIOTHORACIC VASCULAR SURGERY)

## 2022-03-02 PROCEDURE — 25010000002 FENTANYL CITRATE (PF) 50 MCG/ML SOLUTION: Performed by: STUDENT IN AN ORGANIZED HEALTH CARE EDUCATION/TRAINING PROGRAM

## 2022-03-02 PROCEDURE — 25010000002 MAGNESIUM SULFATE PER 500 MG OF MAGNESIUM: Performed by: STUDENT IN AN ORGANIZED HEALTH CARE EDUCATION/TRAINING PROGRAM

## 2022-03-02 PROCEDURE — 021309W BYPASS CORONARY ARTERY, FOUR OR MORE ARTERIES FROM AORTA WITH AUTOLOGOUS VENOUS TISSUE, OPEN APPROACH: ICD-10-PCS | Performed by: THORACIC SURGERY (CARDIOTHORACIC VASCULAR SURGERY)

## 2022-03-02 PROCEDURE — 25010000002 FENTANYL CITRATE (PF) 50 MCG/ML SOLUTION: Performed by: ANESTHESIOLOGY

## 2022-03-02 PROCEDURE — P9041 ALBUMIN (HUMAN),5%, 50ML: HCPCS | Performed by: NURSE PRACTITIONER

## 2022-03-02 PROCEDURE — 86900 BLOOD TYPING SEROLOGIC ABO: CPT

## 2022-03-02 PROCEDURE — 02110Z9 BYPASS CORONARY ARTERY, TWO ARTERIES FROM LEFT INTERNAL MAMMARY, OPEN APPROACH: ICD-10-PCS | Performed by: THORACIC SURGERY (CARDIOTHORACIC VASCULAR SURGERY)

## 2022-03-02 PROCEDURE — 94002 VENT MGMT INPAT INIT DAY: CPT

## 2022-03-02 PROCEDURE — 82962 GLUCOSE BLOOD TEST: CPT

## 2022-03-02 PROCEDURE — 86901 BLOOD TYPING SEROLOGIC RH(D): CPT

## 2022-03-02 PROCEDURE — 85014 HEMATOCRIT: CPT

## 2022-03-02 PROCEDURE — 25010000002 ONDANSETRON PER 1 MG

## 2022-03-02 PROCEDURE — 82803 BLOOD GASES ANY COMBINATION: CPT

## 2022-03-02 PROCEDURE — 94799 UNLISTED PULMONARY SVC/PX: CPT

## 2022-03-02 PROCEDURE — 0W9D0ZZ DRAINAGE OF PERICARDIAL CAVITY, OPEN APPROACH: ICD-10-PCS | Performed by: THORACIC SURGERY (CARDIOTHORACIC VASCULAR SURGERY)

## 2022-03-02 PROCEDURE — C1751 CATH, INF, PER/CENT/MIDLINE: HCPCS | Performed by: STUDENT IN AN ORGANIZED HEALTH CARE EDUCATION/TRAINING PROGRAM

## 2022-03-02 PROCEDURE — P9047 ALBUMIN (HUMAN), 25%, 50ML: HCPCS

## 2022-03-02 PROCEDURE — 25010000002 FENTANYL CITRATE (PF) 50 MCG/ML SOLUTION

## 2022-03-02 PROCEDURE — 25010000002 MIDAZOLAM PER 1 MG: Performed by: ANESTHESIOLOGY

## 2022-03-02 PROCEDURE — 85027 COMPLETE CBC AUTOMATED: CPT | Performed by: NURSE PRACTITIONER

## 2022-03-02 PROCEDURE — 33534 CABG ARTERIAL TWO: CPT | Performed by: PHYSICIAN ASSISTANT

## 2022-03-02 PROCEDURE — 33508 ENDOSCOPIC VEIN HARVEST: CPT | Performed by: THORACIC SURGERY (CARDIOTHORACIC VASCULAR SURGERY)

## 2022-03-02 PROCEDURE — 82330 ASSAY OF CALCIUM: CPT | Performed by: NURSE PRACTITIONER

## 2022-03-02 PROCEDURE — 25010000002 ALBUMIN HUMAN 5% PER 50 ML: Performed by: NURSE PRACTITIONER

## 2022-03-02 PROCEDURE — 25010000002 PROPOFOL 10 MG/ML EMULSION: Performed by: STUDENT IN AN ORGANIZED HEALTH CARE EDUCATION/TRAINING PROGRAM

## 2022-03-02 PROCEDURE — 06BQ4ZZ EXCISION OF LEFT SAPHENOUS VEIN, PERCUTANEOUS ENDOSCOPIC APPROACH: ICD-10-PCS | Performed by: THORACIC SURGERY (CARDIOTHORACIC VASCULAR SURGERY)

## 2022-03-02 PROCEDURE — B246ZZ4 ULTRASONOGRAPHY OF RIGHT AND LEFT HEART, TRANSESOPHAGEAL: ICD-10-PCS | Performed by: STUDENT IN AN ORGANIZED HEALTH CARE EDUCATION/TRAINING PROGRAM

## 2022-03-02 PROCEDURE — 25010000002 MIDAZOLAM PER 1 MG: Performed by: STUDENT IN AN ORGANIZED HEALTH CARE EDUCATION/TRAINING PROGRAM

## 2022-03-02 PROCEDURE — 85347 COAGULATION TIME ACTIVATED: CPT

## 2022-03-02 PROCEDURE — 5A1221Z PERFORMANCE OF CARDIAC OUTPUT, CONTINUOUS: ICD-10-PCS | Performed by: THORACIC SURGERY (CARDIOTHORACIC VASCULAR SURGERY)

## 2022-03-02 PROCEDURE — 85610 PROTHROMBIN TIME: CPT | Performed by: NURSE PRACTITIONER

## 2022-03-02 PROCEDURE — P9016 RBC LEUKOCYTES REDUCED: HCPCS

## 2022-03-02 PROCEDURE — 93318 ECHO TRANSESOPHAGEAL INTRAOP: CPT | Performed by: STUDENT IN AN ORGANIZED HEALTH CARE EDUCATION/TRAINING PROGRAM

## 2022-03-02 PROCEDURE — 85025 COMPLETE CBC W/AUTO DIFF WBC: CPT | Performed by: NURSE PRACTITIONER

## 2022-03-02 PROCEDURE — 80069 RENAL FUNCTION PANEL: CPT | Performed by: NURSE PRACTITIONER

## 2022-03-02 PROCEDURE — 82947 ASSAY GLUCOSE BLOOD QUANT: CPT

## 2022-03-02 PROCEDURE — 25010000002 METOCLOPRAMIDE PER 10 MG: Performed by: NURSE PRACTITIONER

## 2022-03-02 PROCEDURE — 83735 ASSAY OF MAGNESIUM: CPT | Performed by: NURSE PRACTITIONER

## 2022-03-02 PROCEDURE — C1713 ANCHOR/SCREW BN/BN,TIS/BN: HCPCS | Performed by: THORACIC SURGERY (CARDIOTHORACIC VASCULAR SURGERY)

## 2022-03-02 PROCEDURE — 25010000002 DEXAMETHASONE SODIUM PHOSPHATE 20 MG/5ML SOLUTION

## 2022-03-02 PROCEDURE — 85384 FIBRINOGEN ACTIVITY: CPT | Performed by: NURSE PRACTITIONER

## 2022-03-02 PROCEDURE — 25010000002 VANCOMYCIN 1 G RECONSTITUTED SOLUTION

## 2022-03-02 PROCEDURE — 71045 X-RAY EXAM CHEST 1 VIEW: CPT

## 2022-03-02 PROCEDURE — C1729 CATH, DRAINAGE: HCPCS | Performed by: THORACIC SURGERY (CARDIOTHORACIC VASCULAR SURGERY)

## 2022-03-02 PROCEDURE — 33508 ENDOSCOPIC VEIN HARVEST: CPT | Performed by: PHYSICIAN ASSISTANT

## 2022-03-02 PROCEDURE — 25010000002 PAPAVERINE PER 60 MG: Performed by: THORACIC SURGERY (CARDIOTHORACIC VASCULAR SURGERY)

## 2022-03-02 PROCEDURE — 25010000002 HEPARIN (PORCINE) PER 1000 UNITS: Performed by: STUDENT IN AN ORGANIZED HEALTH CARE EDUCATION/TRAINING PROGRAM

## 2022-03-02 PROCEDURE — 85018 HEMOGLOBIN: CPT

## 2022-03-02 PROCEDURE — 33522 CABG ARTERY-VEIN FIVE: CPT | Performed by: PHYSICIAN ASSISTANT

## 2022-03-02 PROCEDURE — 33522 CABG ARTERY-VEIN FIVE: CPT | Performed by: THORACIC SURGERY (CARDIOTHORACIC VASCULAR SURGERY)

## 2022-03-02 DEVICE — SS SUTURE, 4 PER SLEEVE
Type: IMPLANTABLE DEVICE | Site: STERNUM | Status: FUNCTIONAL
Brand: MYO/WIRE II

## 2022-03-02 RX ORDER — ASPIRIN 81 MG/1
81 TABLET ORAL DAILY
Status: DISCONTINUED | OUTPATIENT
Start: 2022-03-03 | End: 2022-03-07 | Stop reason: HOSPADM

## 2022-03-02 RX ORDER — METOCLOPRAMIDE HYDROCHLORIDE 5 MG/ML
10 INJECTION INTRAMUSCULAR; INTRAVENOUS EVERY 6 HOURS
Status: COMPLETED | OUTPATIENT
Start: 2022-03-02 | End: 2022-03-03

## 2022-03-02 RX ORDER — SODIUM CHLORIDE 9 MG/ML
30 INJECTION, SOLUTION INTRAVENOUS CONTINUOUS PRN
Status: DISCONTINUED | OUTPATIENT
Start: 2022-03-02 | End: 2022-03-07 | Stop reason: HOSPADM

## 2022-03-02 RX ORDER — MAGNESIUM SULFATE HEPTAHYDRATE 500 MG/ML
INJECTION, SOLUTION INTRAMUSCULAR; INTRAVENOUS AS NEEDED
Status: DISCONTINUED | OUTPATIENT
Start: 2022-03-02 | End: 2022-03-02 | Stop reason: SURG

## 2022-03-02 RX ORDER — METHADONE HYDROCHLORIDE 10 MG/ML
10 INJECTION, SOLUTION INTRAMUSCULAR; INTRAVENOUS; SUBCUTANEOUS ONCE AS NEEDED
Status: COMPLETED | OUTPATIENT
Start: 2022-03-02 | End: 2022-03-02

## 2022-03-02 RX ORDER — POTASSIUM CHLORIDE 29.8 MG/ML
20 INJECTION INTRAVENOUS
Status: DISCONTINUED | OUTPATIENT
Start: 2022-03-02 | End: 2022-03-07 | Stop reason: HOSPADM

## 2022-03-02 RX ORDER — POTASSIUM CHLORIDE 7.45 MG/ML
10 INJECTION INTRAVENOUS
Status: DISCONTINUED | OUTPATIENT
Start: 2022-03-02 | End: 2022-03-07 | Stop reason: HOSPADM

## 2022-03-02 RX ORDER — PROPOFOL 10 MG/ML
VIAL (ML) INTRAVENOUS CONTINUOUS PRN
Status: DISCONTINUED | OUTPATIENT
Start: 2022-03-02 | End: 2022-03-02 | Stop reason: SURG

## 2022-03-02 RX ORDER — CEFAZOLIN SODIUM 2 G/100ML
2 INJECTION, SOLUTION INTRAVENOUS EVERY 8 HOURS
Status: COMPLETED | OUTPATIENT
Start: 2022-03-02 | End: 2022-03-04

## 2022-03-02 RX ORDER — AMINOCAPROIC ACID 250 MG/ML
INJECTION, SOLUTION INTRAVENOUS AS NEEDED
Status: DISCONTINUED | OUTPATIENT
Start: 2022-03-02 | End: 2022-03-02 | Stop reason: SURG

## 2022-03-02 RX ORDER — AMOXICILLIN 250 MG
2 CAPSULE ORAL NIGHTLY
Status: DISCONTINUED | OUTPATIENT
Start: 2022-03-03 | End: 2022-03-07 | Stop reason: HOSPADM

## 2022-03-02 RX ORDER — CHLORHEXIDINE GLUCONATE 0.12 MG/ML
15 RINSE ORAL ONCE
Status: DISCONTINUED | OUTPATIENT
Start: 2022-03-02 | End: 2022-03-02 | Stop reason: HOSPADM

## 2022-03-02 RX ORDER — PANTOPRAZOLE SODIUM 40 MG/10ML
40 INJECTION, POWDER, LYOPHILIZED, FOR SOLUTION INTRAVENOUS ONCE
Status: COMPLETED | OUTPATIENT
Start: 2022-03-02 | End: 2022-03-02

## 2022-03-02 RX ORDER — SODIUM CHLORIDE 9 MG/ML
INJECTION, SOLUTION INTRAVENOUS CONTINUOUS PRN
Status: DISCONTINUED | OUTPATIENT
Start: 2022-03-02 | End: 2022-03-02 | Stop reason: SURG

## 2022-03-02 RX ORDER — PAPAVERINE HYDROCHLORIDE 30 MG/ML
INJECTION INTRAMUSCULAR; INTRAVENOUS AS NEEDED
Status: DISCONTINUED | OUTPATIENT
Start: 2022-03-02 | End: 2022-03-02 | Stop reason: HOSPADM

## 2022-03-02 RX ORDER — ALBUMIN, HUMAN INJ 5% 5 %
1500 SOLUTION INTRAVENOUS AS NEEDED
Status: DISCONTINUED | OUTPATIENT
Start: 2022-03-02 | End: 2022-03-03

## 2022-03-02 RX ORDER — ROCURONIUM BROMIDE 10 MG/ML
INJECTION, SOLUTION INTRAVENOUS AS NEEDED
Status: DISCONTINUED | OUTPATIENT
Start: 2022-03-02 | End: 2022-03-02 | Stop reason: SURG

## 2022-03-02 RX ORDER — DEXMEDETOMIDINE HYDROCHLORIDE 4 UG/ML
INJECTION, SOLUTION INTRAVENOUS CONTINUOUS PRN
Status: DISCONTINUED | OUTPATIENT
Start: 2022-03-02 | End: 2022-03-02 | Stop reason: SURG

## 2022-03-02 RX ORDER — ATORVASTATIN CALCIUM 20 MG/1
40 TABLET, FILM COATED ORAL NIGHTLY
Status: DISCONTINUED | OUTPATIENT
Start: 2022-03-02 | End: 2022-03-07 | Stop reason: HOSPADM

## 2022-03-02 RX ORDER — CYCLOBENZAPRINE HCL 10 MG
10 TABLET ORAL EVERY 8 HOURS PRN
Status: DISCONTINUED | OUTPATIENT
Start: 2022-03-03 | End: 2022-03-07 | Stop reason: HOSPADM

## 2022-03-02 RX ORDER — OXYCODONE HYDROCHLORIDE 5 MG/1
10 TABLET ORAL EVERY 4 HOURS PRN
Status: DISCONTINUED | OUTPATIENT
Start: 2022-03-02 | End: 2022-03-07 | Stop reason: HOSPADM

## 2022-03-02 RX ORDER — SODIUM CHLORIDE 0.9 % (FLUSH) 0.9 %
3-10 SYRINGE (ML) INJECTION AS NEEDED
Status: DISCONTINUED | OUTPATIENT
Start: 2022-03-02 | End: 2022-03-02 | Stop reason: HOSPADM

## 2022-03-02 RX ORDER — CHLORHEXIDINE GLUCONATE 0.12 MG/ML
15 RINSE ORAL EVERY 12 HOURS
Status: DISCONTINUED | OUTPATIENT
Start: 2022-03-02 | End: 2022-03-07 | Stop reason: HOSPADM

## 2022-03-02 RX ORDER — FENTANYL CITRATE 50 UG/ML
INJECTION, SOLUTION INTRAMUSCULAR; INTRAVENOUS AS NEEDED
Status: DISCONTINUED | OUTPATIENT
Start: 2022-03-02 | End: 2022-03-02 | Stop reason: SURG

## 2022-03-02 RX ORDER — MORPHINE SULFATE 2 MG/ML
1 INJECTION, SOLUTION INTRAMUSCULAR; INTRAVENOUS EVERY 4 HOURS PRN
Status: DISCONTINUED | OUTPATIENT
Start: 2022-03-02 | End: 2022-03-07 | Stop reason: HOSPADM

## 2022-03-02 RX ORDER — FENTANYL CITRATE 50 UG/ML
50 INJECTION, SOLUTION INTRAMUSCULAR; INTRAVENOUS
Status: DISCONTINUED | OUTPATIENT
Start: 2022-03-02 | End: 2022-03-02 | Stop reason: HOSPADM

## 2022-03-02 RX ORDER — CEFAZOLIN SODIUM 2 G/100ML
2 INJECTION, SOLUTION INTRAVENOUS
Status: COMPLETED | OUTPATIENT
Start: 2022-03-03 | End: 2022-03-02

## 2022-03-02 RX ORDER — POTASSIUM CHLORIDE 750 MG/1
40 TABLET, FILM COATED, EXTENDED RELEASE ORAL AS NEEDED
Status: DISCONTINUED | OUTPATIENT
Start: 2022-03-02 | End: 2022-03-07 | Stop reason: HOSPADM

## 2022-03-02 RX ORDER — NALOXONE HCL 0.4 MG/ML
0.4 VIAL (ML) INJECTION
Status: DISCONTINUED | OUTPATIENT
Start: 2022-03-02 | End: 2022-03-07 | Stop reason: HOSPADM

## 2022-03-02 RX ORDER — PHENYLEPHRINE HCL IN 0.9% NACL 0.5 MG/5ML
.2-2 SYRINGE (ML) INTRAVENOUS CONTINUOUS PRN
Status: DISCONTINUED | OUTPATIENT
Start: 2022-03-02 | End: 2022-03-03

## 2022-03-02 RX ORDER — ALPRAZOLAM 0.25 MG/1
0.25 TABLET ORAL EVERY 8 HOURS PRN
Status: DISCONTINUED | OUTPATIENT
Start: 2022-03-02 | End: 2022-03-07 | Stop reason: HOSPADM

## 2022-03-02 RX ORDER — MAGNESIUM SULFATE 1 G/100ML
1 INJECTION INTRAVENOUS EVERY 8 HOURS
Status: DISCONTINUED | OUTPATIENT
Start: 2022-03-02 | End: 2022-03-03

## 2022-03-02 RX ORDER — CALCIUM CHLORIDE 100 MG/ML
INJECTION INTRAVENOUS; INTRAVENTRICULAR AS NEEDED
Status: DISCONTINUED | OUTPATIENT
Start: 2022-03-02 | End: 2022-03-02 | Stop reason: SURG

## 2022-03-02 RX ORDER — SODIUM CHLORIDE 0.9 % (FLUSH) 0.9 %
30 SYRINGE (ML) INJECTION ONCE AS NEEDED
Status: DISCONTINUED | OUTPATIENT
Start: 2022-03-02 | End: 2022-03-07 | Stop reason: HOSPADM

## 2022-03-02 RX ORDER — POLYETHYLENE GLYCOL 3350 17 G/17G
17 POWDER, FOR SOLUTION ORAL DAILY PRN
Status: DISCONTINUED | OUTPATIENT
Start: 2022-03-02 | End: 2022-03-07 | Stop reason: HOSPADM

## 2022-03-02 RX ORDER — MIDAZOLAM HYDROCHLORIDE 1 MG/ML
INJECTION INTRAMUSCULAR; INTRAVENOUS
Status: COMPLETED | OUTPATIENT
Start: 2022-03-02 | End: 2022-03-02

## 2022-03-02 RX ORDER — LIDOCAINE HYDROCHLORIDE 10 MG/ML
0.5 INJECTION, SOLUTION EPIDURAL; INFILTRATION; INTRACAUDAL; PERINEURAL ONCE AS NEEDED
Status: DISCONTINUED | OUTPATIENT
Start: 2022-03-02 | End: 2022-03-02 | Stop reason: HOSPADM

## 2022-03-02 RX ORDER — NOREPINEPHRINE BIT/0.9 % NACL 8 MG/250ML
.02-.3 INFUSION BOTTLE (ML) INTRAVENOUS CONTINUOUS PRN
Status: DISCONTINUED | OUTPATIENT
Start: 2022-03-02 | End: 2022-03-03

## 2022-03-02 RX ORDER — FENTANYL CITRATE 50 UG/ML
INJECTION, SOLUTION INTRAMUSCULAR; INTRAVENOUS
Status: COMPLETED | OUTPATIENT
Start: 2022-03-02 | End: 2022-03-02

## 2022-03-02 RX ORDER — ACETAMINOPHEN 325 MG/1
650 TABLET ORAL EVERY 4 HOURS
Status: DISCONTINUED | OUTPATIENT
Start: 2022-03-02 | End: 2022-03-03

## 2022-03-02 RX ORDER — DEXTROSE MONOHYDRATE 25 G/50ML
50 INJECTION, SOLUTION INTRAVENOUS
Status: DISCONTINUED | OUTPATIENT
Start: 2022-03-02 | End: 2022-03-02 | Stop reason: HOSPADM

## 2022-03-02 RX ORDER — FAMOTIDINE 10 MG/ML
20 INJECTION, SOLUTION INTRAVENOUS ONCE
Status: COMPLETED | OUTPATIENT
Start: 2022-03-02 | End: 2022-03-02

## 2022-03-02 RX ORDER — SODIUM CHLORIDE, SODIUM LACTATE, POTASSIUM CHLORIDE, CALCIUM CHLORIDE 600; 310; 30; 20 MG/100ML; MG/100ML; MG/100ML; MG/100ML
9 INJECTION, SOLUTION INTRAVENOUS CONTINUOUS
Status: DISCONTINUED | OUTPATIENT
Start: 2022-03-02 | End: 2022-03-02

## 2022-03-02 RX ORDER — HEPARIN SODIUM 1000 [USP'U]/ML
INJECTION, SOLUTION INTRAVENOUS; SUBCUTANEOUS AS NEEDED
Status: DISCONTINUED | OUTPATIENT
Start: 2022-03-02 | End: 2022-03-02 | Stop reason: SURG

## 2022-03-02 RX ORDER — HEPARIN SODIUM 5000 [USP'U]/ML
INJECTION, SOLUTION INTRAVENOUS; SUBCUTANEOUS AS NEEDED
Status: DISCONTINUED | OUTPATIENT
Start: 2022-03-02 | End: 2022-03-02 | Stop reason: HOSPADM

## 2022-03-02 RX ORDER — MORPHINE SULFATE 2 MG/ML
4 INJECTION, SOLUTION INTRAMUSCULAR; INTRAVENOUS
Status: DISCONTINUED | OUTPATIENT
Start: 2022-03-02 | End: 2022-03-03

## 2022-03-02 RX ORDER — ONDANSETRON 2 MG/ML
4 INJECTION INTRAMUSCULAR; INTRAVENOUS EVERY 6 HOURS PRN
Status: DISCONTINUED | OUTPATIENT
Start: 2022-03-02 | End: 2022-03-07 | Stop reason: HOSPADM

## 2022-03-02 RX ORDER — ACETAMINOPHEN 160 MG/5ML
650 SOLUTION ORAL EVERY 4 HOURS PRN
Status: DISCONTINUED | OUTPATIENT
Start: 2022-03-03 | End: 2022-03-07 | Stop reason: HOSPADM

## 2022-03-02 RX ORDER — NITROGLYCERIN 20 MG/100ML
5-200 INJECTION INTRAVENOUS
Status: DISCONTINUED | OUTPATIENT
Start: 2022-03-02 | End: 2022-03-03

## 2022-03-02 RX ORDER — POTASSIUM CHLORIDE 1.5 G/1.77G
40 POWDER, FOR SOLUTION ORAL AS NEEDED
Status: DISCONTINUED | OUTPATIENT
Start: 2022-03-02 | End: 2022-03-07 | Stop reason: HOSPADM

## 2022-03-02 RX ORDER — SODIUM CHLORIDE 0.9 % (FLUSH) 0.9 %
3 SYRINGE (ML) INJECTION EVERY 12 HOURS SCHEDULED
Status: DISCONTINUED | OUTPATIENT
Start: 2022-03-02 | End: 2022-03-02 | Stop reason: HOSPADM

## 2022-03-02 RX ORDER — ACETAMINOPHEN 650 MG/1
650 SUPPOSITORY RECTAL EVERY 4 HOURS
Status: DISCONTINUED | OUTPATIENT
Start: 2022-03-02 | End: 2022-03-03

## 2022-03-02 RX ORDER — PROPOFOL 10 MG/ML
VIAL (ML) INTRAVENOUS AS NEEDED
Status: DISCONTINUED | OUTPATIENT
Start: 2022-03-02 | End: 2022-03-02 | Stop reason: SURG

## 2022-03-02 RX ORDER — PANTOPRAZOLE SODIUM 40 MG/1
40 TABLET, DELAYED RELEASE ORAL EVERY MORNING
Status: DISCONTINUED | OUTPATIENT
Start: 2022-03-03 | End: 2022-03-07 | Stop reason: HOSPADM

## 2022-03-02 RX ORDER — ACETAMINOPHEN 160 MG/5ML
650 SOLUTION ORAL EVERY 4 HOURS
Status: DISCONTINUED | OUTPATIENT
Start: 2022-03-02 | End: 2022-03-03

## 2022-03-02 RX ORDER — MIDAZOLAM HYDROCHLORIDE 1 MG/ML
2 INJECTION INTRAMUSCULAR; INTRAVENOUS
Status: DISCONTINUED | OUTPATIENT
Start: 2022-03-02 | End: 2022-03-03

## 2022-03-02 RX ORDER — CHLORHEXIDINE GLUCONATE 500 MG/1
1 CLOTH TOPICAL EVERY 12 HOURS PRN
Status: DISCONTINUED | OUTPATIENT
Start: 2022-03-02 | End: 2022-03-02 | Stop reason: HOSPADM

## 2022-03-02 RX ORDER — SODIUM CHLORIDE 9 MG/ML
30 INJECTION, SOLUTION INTRAVENOUS CONTINUOUS
Status: DISCONTINUED | OUTPATIENT
Start: 2022-03-02 | End: 2022-03-07 | Stop reason: HOSPADM

## 2022-03-02 RX ORDER — DEXAMETHASONE SODIUM PHOSPHATE 4 MG/ML
INJECTION, SOLUTION INTRA-ARTICULAR; INTRALESIONAL; INTRAMUSCULAR; INTRAVENOUS; SOFT TISSUE AS NEEDED
Status: DISCONTINUED | OUTPATIENT
Start: 2022-03-02 | End: 2022-03-02 | Stop reason: SURG

## 2022-03-02 RX ORDER — DOPAMINE HYDROCHLORIDE 160 MG/100ML
2-20 INJECTION, SOLUTION INTRAVENOUS CONTINUOUS PRN
Status: DISCONTINUED | OUTPATIENT
Start: 2022-03-02 | End: 2022-03-03

## 2022-03-02 RX ORDER — NOREPINEPHRINE BITARTRATE 1 MG/ML
INJECTION, SOLUTION INTRAVENOUS CONTINUOUS PRN
Status: DISCONTINUED | OUTPATIENT
Start: 2022-03-02 | End: 2022-03-02 | Stop reason: SURG

## 2022-03-02 RX ORDER — BISACODYL 10 MG
10 SUPPOSITORY, RECTAL RECTAL DAILY PRN
Status: DISCONTINUED | OUTPATIENT
Start: 2022-03-03 | End: 2022-03-07 | Stop reason: HOSPADM

## 2022-03-02 RX ORDER — NICARDIPINE HYDROCHLORIDE 2.5 MG/ML
INJECTION INTRAVENOUS AS NEEDED
Status: DISCONTINUED | OUTPATIENT
Start: 2022-03-02 | End: 2022-03-02 | Stop reason: SURG

## 2022-03-02 RX ORDER — ACETAMINOPHEN 650 MG/1
650 SUPPOSITORY RECTAL EVERY 4 HOURS PRN
Status: DISCONTINUED | OUTPATIENT
Start: 2022-03-03 | End: 2022-03-07 | Stop reason: HOSPADM

## 2022-03-02 RX ORDER — KETAMINE HYDROCHLORIDE 10 MG/ML
INJECTION INTRAMUSCULAR; INTRAVENOUS AS NEEDED
Status: DISCONTINUED | OUTPATIENT
Start: 2022-03-02 | End: 2022-03-02 | Stop reason: SURG

## 2022-03-02 RX ORDER — ACETAMINOPHEN 325 MG/1
650 TABLET ORAL EVERY 4 HOURS PRN
Status: DISCONTINUED | OUTPATIENT
Start: 2022-03-03 | End: 2022-03-07 | Stop reason: HOSPADM

## 2022-03-02 RX ORDER — MIDAZOLAM HYDROCHLORIDE 1 MG/ML
INJECTION INTRAMUSCULAR; INTRAVENOUS AS NEEDED
Status: DISCONTINUED | OUTPATIENT
Start: 2022-03-02 | End: 2022-03-02 | Stop reason: SURG

## 2022-03-02 RX ORDER — MIDAZOLAM HYDROCHLORIDE 1 MG/ML
1 INJECTION INTRAMUSCULAR; INTRAVENOUS
Status: DISCONTINUED | OUTPATIENT
Start: 2022-03-02 | End: 2022-03-02 | Stop reason: HOSPADM

## 2022-03-02 RX ORDER — MILRINONE LACTATE 0.2 MG/ML
.25-.375 INJECTION, SOLUTION INTRAVENOUS CONTINUOUS PRN
Status: DISCONTINUED | OUTPATIENT
Start: 2022-03-02 | End: 2022-03-03

## 2022-03-02 RX ORDER — MEPERIDINE HYDROCHLORIDE 25 MG/ML
25 INJECTION INTRAMUSCULAR; INTRAVENOUS; SUBCUTANEOUS EVERY 4 HOURS PRN
Status: DISCONTINUED | OUTPATIENT
Start: 2022-03-02 | End: 2022-03-03

## 2022-03-02 RX ORDER — BISACODYL 5 MG/1
10 TABLET, DELAYED RELEASE ORAL DAILY PRN
Status: DISCONTINUED | OUTPATIENT
Start: 2022-03-02 | End: 2022-03-07 | Stop reason: HOSPADM

## 2022-03-02 RX ORDER — ONDANSETRON 2 MG/ML
INJECTION INTRAMUSCULAR; INTRAVENOUS AS NEEDED
Status: DISCONTINUED | OUTPATIENT
Start: 2022-03-02 | End: 2022-03-02 | Stop reason: SURG

## 2022-03-02 RX ORDER — HYDROCODONE BITARTRATE AND ACETAMINOPHEN 5; 325 MG/1; MG/1
2 TABLET ORAL EVERY 4 HOURS PRN
Status: DISCONTINUED | OUTPATIENT
Start: 2022-03-02 | End: 2022-03-07 | Stop reason: HOSPADM

## 2022-03-02 RX ADMIN — SODIUM BICARBONATE 100 MEQ: 84 INJECTION, SOLUTION INTRAVENOUS at 18:18

## 2022-03-02 RX ADMIN — AMINOCAPROIC ACID 10 G: 250 INJECTION, SOLUTION INTRAVENOUS at 11:44

## 2022-03-02 RX ADMIN — NICARDIPINE HYDROCHLORIDE 0.2 MG: 2.5 INJECTION, SOLUTION INTRAVENOUS at 12:43

## 2022-03-02 RX ADMIN — METOCLOPRAMIDE HYDROCHLORIDE 10 MG: 5 INJECTION INTRAMUSCULAR; INTRAVENOUS at 16:34

## 2022-03-02 RX ADMIN — CALCIUM CHLORIDE 250 MG: 100 INJECTION, SOLUTION INTRAVENOUS at 14:15

## 2022-03-02 RX ADMIN — Medication 50 MG: at 11:02

## 2022-03-02 RX ADMIN — NICARDIPINE HYDROCHLORIDE 0.4 MG: 2.5 INJECTION, SOLUTION INTRAVENOUS at 14:31

## 2022-03-02 RX ADMIN — FENTANYL CITRATE 100 MCG: 0.05 INJECTION, SOLUTION INTRAMUSCULAR; INTRAVENOUS at 14:46

## 2022-03-02 RX ADMIN — FENTANYL CITRATE 50 MCG: 0.05 INJECTION, SOLUTION INTRAMUSCULAR; INTRAVENOUS at 11:41

## 2022-03-02 RX ADMIN — FENTANYL CITRATE 100 MCG: 0.05 INJECTION, SOLUTION INTRAMUSCULAR; INTRAVENOUS at 14:36

## 2022-03-02 RX ADMIN — Medication 30 MG: at 13:43

## 2022-03-02 RX ADMIN — ONDANSETRON 4 MG: 2 INJECTION INTRAMUSCULAR; INTRAVENOUS at 14:42

## 2022-03-02 RX ADMIN — NICARDIPINE HYDROCHLORIDE 0.4 MG: 2.5 INJECTION, SOLUTION INTRAVENOUS at 14:33

## 2022-03-02 RX ADMIN — MIDAZOLAM 2 MG: 1 INJECTION INTRAMUSCULAR; INTRAVENOUS at 13:43

## 2022-03-02 RX ADMIN — NICARDIPINE HYDROCHLORIDE 0.2 MG: 2.5 INJECTION, SOLUTION INTRAVENOUS at 12:06

## 2022-03-02 RX ADMIN — KETAMINE HYDROCHLORIDE 10 MG: 10 INJECTION INTRAMUSCULAR; INTRAVENOUS at 13:43

## 2022-03-02 RX ADMIN — FENTANYL CITRATE 50 MCG: 0.05 INJECTION, SOLUTION INTRAMUSCULAR; INTRAVENOUS at 10:59

## 2022-03-02 RX ADMIN — INSULIN HUMAN 2.8 UNITS/HR: 1 INJECTION, SOLUTION INTRAVENOUS at 16:21

## 2022-03-02 RX ADMIN — NICARDIPINE HYDROCHLORIDE 0.4 MG: 2.5 INJECTION, SOLUTION INTRAVENOUS at 14:47

## 2022-03-02 RX ADMIN — PROPOFOL 25 MCG/KG/MIN: 10 INJECTION, EMULSION INTRAVENOUS at 11:10

## 2022-03-02 RX ADMIN — KETAMINE HYDROCHLORIDE 20 MG: 10 INJECTION INTRAMUSCULAR; INTRAVENOUS at 11:02

## 2022-03-02 RX ADMIN — KETAMINE HYDROCHLORIDE 10 MG: 10 INJECTION INTRAMUSCULAR; INTRAVENOUS at 12:56

## 2022-03-02 RX ADMIN — FENTANYL CITRATE 100 MCG: 0.05 INJECTION, SOLUTION INTRAMUSCULAR; INTRAVENOUS at 11:28

## 2022-03-02 RX ADMIN — CEFAZOLIN SODIUM 2 G: 2 INJECTION, SOLUTION INTRAVENOUS at 11:25

## 2022-03-02 RX ADMIN — ROCURONIUM BROMIDE 60 MG: 50 INJECTION INTRAVENOUS at 11:02

## 2022-03-02 RX ADMIN — CEFAZOLIN SODIUM 2 G: 2 INJECTION, SOLUTION INTRAVENOUS at 21:30

## 2022-03-02 RX ADMIN — FENTANYL CITRATE 50 MCG: 0.05 INJECTION, SOLUTION INTRAMUSCULAR; INTRAVENOUS at 09:58

## 2022-03-02 RX ADMIN — CALCIUM CHLORIDE 250 MG: 100 INJECTION, SOLUTION INTRAVENOUS at 14:28

## 2022-03-02 RX ADMIN — SODIUM CHLORIDE: 9 INJECTION, SOLUTION INTRAVENOUS at 10:35

## 2022-03-02 RX ADMIN — ATORVASTATIN CALCIUM 40 MG: 20 TABLET, FILM COATED ORAL at 21:10

## 2022-03-02 RX ADMIN — HYDROCODONE BITARTRATE AND ACETAMINOPHEN 2 TABLET: 5; 325 TABLET ORAL at 19:45

## 2022-03-02 RX ADMIN — METHADONE HYDROCHLORIDE 10 MG: 10 INJECTION, SOLUTION INTRAMUSCULAR; INTRAVENOUS; SUBCUTANEOUS at 11:36

## 2022-03-02 RX ADMIN — PANTOPRAZOLE SODIUM 40 MG: 40 INJECTION, POWDER, FOR SOLUTION INTRAVENOUS at 16:34

## 2022-03-02 RX ADMIN — NICARDIPINE HYDROCHLORIDE 0.2 MG: 2.5 INJECTION, SOLUTION INTRAVENOUS at 12:08

## 2022-03-02 RX ADMIN — AMINOCAPROIC ACID 10 G: 250 INJECTION, SOLUTION INTRAVENOUS at 14:28

## 2022-03-02 RX ADMIN — MUPIROCIN 1 APPLICATION: 20 OINTMENT TOPICAL at 21:10

## 2022-03-02 RX ADMIN — DEXTROSE MONOHYDRATE 25 ML: 500 INJECTION PARENTERAL at 09:50

## 2022-03-02 RX ADMIN — ROCURONIUM BROMIDE 20 MG: 50 INJECTION INTRAVENOUS at 14:26

## 2022-03-02 RX ADMIN — CHLORHEXIDINE GLUCONATE 15 ML: 1.2 RINSE ORAL at 21:10

## 2022-03-02 RX ADMIN — NICARDIPINE HYDROCHLORIDE 0.2 MG: 2.5 INJECTION, SOLUTION INTRAVENOUS at 12:12

## 2022-03-02 RX ADMIN — DEXMEDETOMIDINE HYDROCHLORIDE 1 MCG/KG/HR: 4 INJECTION, SOLUTION INTRAVENOUS at 10:56

## 2022-03-02 RX ADMIN — FAMOTIDINE 20 MG: 10 INJECTION INTRAVENOUS at 10:17

## 2022-03-02 RX ADMIN — ALBUMIN HUMAN 250 ML: 0.05 INJECTION, SOLUTION INTRAVENOUS at 20:20

## 2022-03-02 RX ADMIN — Medication 30 MG: at 11:42

## 2022-03-02 RX ADMIN — FENTANYL CITRATE 50 MCG: 0.05 INJECTION, SOLUTION INTRAMUSCULAR; INTRAVENOUS at 10:56

## 2022-03-02 RX ADMIN — METOCLOPRAMIDE HYDROCHLORIDE 10 MG: 5 INJECTION INTRAMUSCULAR; INTRAVENOUS at 22:02

## 2022-03-02 RX ADMIN — CEFAZOLIN SODIUM 2 G: 2 INJECTION, SOLUTION INTRAVENOUS at 14:24

## 2022-03-02 RX ADMIN — DEXAMETHASONE SODIUM PHOSPHATE 10 MG: 4 INJECTION, SOLUTION INTRAMUSCULAR; INTRAVENOUS at 11:19

## 2022-03-02 RX ADMIN — NICARDIPINE HYDROCHLORIDE 0.4 MG: 2.5 INJECTION, SOLUTION INTRAVENOUS at 14:35

## 2022-03-02 RX ADMIN — NICARDIPINE HYDROCHLORIDE 0.2 MG: 2.5 INJECTION, SOLUTION INTRAVENOUS at 11:42

## 2022-03-02 RX ADMIN — ROCURONIUM BROMIDE 20 MG: 50 INJECTION INTRAVENOUS at 12:34

## 2022-03-02 RX ADMIN — HEPARIN SODIUM 8000 UNITS: 1000 INJECTION INTRAVENOUS; SUBCUTANEOUS at 12:42

## 2022-03-02 RX ADMIN — Medication 30 MG: at 11:03

## 2022-03-02 RX ADMIN — Medication 100 MEQ: at 18:18

## 2022-03-02 RX ADMIN — NOREPINEPHRINE BITARTRATE 0.03 MCG/KG/MIN: 1 INJECTION, SOLUTION, CONCENTRATE INTRAVENOUS at 11:15

## 2022-03-02 RX ADMIN — NICARDIPINE HYDROCHLORIDE 0.2 MG: 2.5 INJECTION, SOLUTION INTRAVENOUS at 12:09

## 2022-03-02 RX ADMIN — NICARDIPINE HYDROCHLORIDE 2.5 MG/HR: 2.5 INJECTION, SOLUTION INTRAVENOUS at 11:42

## 2022-03-02 RX ADMIN — MIDAZOLAM 1 MG: 1 INJECTION INTRAMUSCULAR; INTRAVENOUS at 10:05

## 2022-03-02 RX ADMIN — NICARDIPINE HYDROCHLORIDE 0.2 MG: 2.5 INJECTION, SOLUTION INTRAVENOUS at 12:33

## 2022-03-02 RX ADMIN — HEPARIN SODIUM 20000 UNITS: 1000 INJECTION INTRAVENOUS; SUBCUTANEOUS at 12:33

## 2022-03-02 RX ADMIN — MIDAZOLAM 2 MG: 1 INJECTION INTRAMUSCULAR; INTRAVENOUS at 09:58

## 2022-03-02 RX ADMIN — MAGNESIUM SULFATE HEPTAHYDRATE 2 G: 500 INJECTION, SOLUTION INTRAMUSCULAR; INTRAVENOUS at 14:10

## 2022-03-02 NOTE — OP NOTE
[facility] CARDIAC SURGERY OP NOTE    Preop Diagnosis: Severe coronary artery disease. Recent SARS-CoV-2 infection with malnutrition improved. Ischemic cardiomyopathy ejection fraction of 30%. Nonhealing ulcer on the right foot. Anemia. Lazy right eye. Cerebral palsy.    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 We thought he was too sick to have surgery. CABG was advisable. He gained weight and was exercising and now ready for surgery. Operation was advisable to prolong life and relieve symptoms.The STS Risk and all risks and alternatives were discussed with the patient and family.  Counseling was done regarding abuse of tobacco, alcohol and drugs as needed. They understand and wish to proceed.    Procedure: CABG x7. Skeletonized LIMA to mid and then distal LAD. Vein graft to RCA and then PDA. Vein graft to D1, OM1, and OM 2, temporary cardiopulmonary bypass. Antegrade and retrograde cold blood cardioplegia with warm reperfusion. Neurologic monitoring. Transesophageal echo. Endoscopic vein harvest left greater saphenous vein. Left posterior lateral pericardial window.    Surgeon: Eliseo Hewitt MD    Assistant: Assistant: Kacie Rodrigues PA was responsible for performing the following activities: Cardiac Surgery First assist, Endoscopic Vein [place] if needed for CABG,  surgical wound closure and their skilled assistance was necessary for the success of this case.     Anesthesia: GET    Findings : There is no height or weight on file to calculate BMI. The heart was enlarged grade 4 with diffuse hypokinesis. The arteries tended to have posterior disease. The RCA was 2.5 mm the PDA was 1.5 mm. OM1 was 2 mm and intramyocardial. OM 2 was 1.5 mm. The diagonal branch was 1.5 with posterior disease. The LAD was 2 mm at the midportion and 2 mm distally. The MAURA was  2.5 mm with excellent blood flow. The vein was 4.5 mm and beautiful.    Operative Procedure: A primary median sternotomy was made while the left greater saphenous vein was harvested with the endoscope. The internal mammary artery was dissected off the left chest wall with a harmonic scalpel and was skeletonized. Cardiopulmonary bypass was established for 88 minutes 15 to 34 °C and appropriate flow rates. The aorta was crossclamped and 72 minutes we gave a liter of antegrade cold blood cardioplegia and then 2 L of retrograde cold blood cardioplegia and repeated doses every 10 to 15 minutes to good effect. 4 veins were anastomosed the ascending aorta with 6-0 Prolene and marked with washers. The first vein was sewn side to side to the RCA and then to the PDA with 7-0 Prolene. The next 3 veins were sewn to the diagonal branch, OM1 and OM 2 with 7-0 Prolene. The LIMA was sewn side to side and the mid LAD and then to the distal LAD with 7-0 Prolene. A warm dose of retrograde cardioplegia was given and then with strong suction on the aortic needle vent the cross-clamp was released. The patient was rewarmed and cardiopulmonary bypass was weaned and discontinued. Decannulation was effected and usual devices were placed. Hemostasis was obtained. 2 chest tubes were inserted and we applied vancomycin enriched plate rich plasma. The sternum was closed with stainless steel wires. The fascia, soft tissues and skin were closed usual. The sponge, needle and instrument counts noted to be correct. All the grafts are nicely and all the anastomoses were hemostatic.    Complications: Anemia. 2 units of packed red cells given in the operating room washed.    Tubes: 2    Epicardial Wires: 3    Blood Loss: Minimal    Bypass Time: 88 min    Aortic cross-clamp time: 72 min    Specimen: None    Condition: Improved       Patient Care Team:  Farhad Mark MD as PCP - General (Internal Medicine)        Eliseo Hewitt MD  3/2/2022  15:17  EST

## 2022-03-02 NOTE — ANESTHESIA PROCEDURE NOTES
Central Line      Patient reassessed immediately prior to procedure    Patient location during procedure: holding area  Start time: 3/2/2022 10:21 AM  Stop Time:3/2/2022 10:22 AM  Indications: vascular access, MD/Surgeon request and central pressure monitoring  Staff  Anesthesiologist: Kaleb Antoine MD  Preanesthetic Checklist  Completed: patient identified, IV checked, risks and benefits discussed, surgical consent, monitors and equipment checked, pre-op evaluation and timeout performed  Central Line Prep  Sterile Tech:cap, gloves, gown, mask and sterile barriers  Prep: chloraprep  Patient monitoring: blood pressure monitoring, continuous pulse oximetry and EKG  Central Line Procedure  Laterality:right  Location:internal jugular  Catheter Type:Empire-Raj  Guidance:landmark technique  Assessment  Post procedure:biopatch applied, line sutured and occlusive dressing applied  Assessement:blood return through all ports, free fluid flow and Sukhwinder Test  Complications:no  Patient Tolerance:patient tolerated the procedure well with no apparent complications

## 2022-03-02 NOTE — ANESTHESIA PROCEDURE NOTES
Airway  Urgency: elective    Date/Time: 3/2/2022 11:04 AM  Airway not difficult    General Information and Staff    Patient location during procedure: OR  Anesthesiologist: Garth Burton MD    Indications and Patient Condition  Indications for airway management: airway protection    Preoxygenated: yes  MILS maintained throughout  Mask difficulty assessment: 1 - vent by mask    Final Airway Details  Final airway type: endotracheal airway      Successful airway: ETT  Cuffed: yes   Successful intubation technique: direct laryngoscopy  Endotracheal tube insertion site: oral  Blade: Ava  Blade size: 3  ETT size (mm): 8.0  Cormack-Lehane Classification: grade I - full view of glottis  Placement verified by: chest auscultation and capnometry   Measured from: teeth  ETT/EBT  to teeth (cm): 22  Number of attempts at approach: 1  Assessment: lips, teeth, and gum same as pre-op and atraumatic intubation

## 2022-03-02 NOTE — ANESTHESIA POSTPROCEDURE EVALUATION
Patient: Pierce Morse    Procedure Summary     Date: 03/02/22 Room / Location: [address] CARDIOVASCULAR OPERATING ROOM    Anesthesia Start: 1036 Anesthesia Stop: 1540    Procedure: CORONARY ARTERY BYPASS GRAFT X5, WITH LEFT INTERNAL MAMMARY HARVEST, MIDLINE STERNOTOMY,  INTRAOP MARIELA, PRP (N/A Chest) Diagnosis:       Coronary artery disease of native heart with stable angina pectoris, unspecified vessel or lesion type (HCC)      (Coronary artery disease of native heart with stable angina pectoris, unspecified vessel or lesion type (HCC) [I25.118])    Surgeons: Jr Eliseo Hewitt MD Provider: Garth Burton MD    Anesthesia Type: general ASA Status: 4          Anesthesia Type: general    Vitals  Vitals Value Taken Time   /74 03/02/22 1532   Temp 36.4 °C (97.52 °F) 03/02/22 1539   Pulse 83 03/02/22 1539   Resp 14 03/02/22 1535   SpO2 100 % 03/02/22 1539   Vitals shown include unvalidated device data.        Post Anesthesia Care and Evaluation    Patient location during evaluation: bedside  Patient participation: complete - patient cannot participate  Level of consciousness: obtunded/minimal responses  Pain management: adequate  Airway patency: Intubated.  Anesthetic complications: No anesthetic complications  PONV Status: controlled  Cardiovascular status: acceptable  Respiratory status: ETT  Hydration status: acceptable    Comments: S/p CABG x6

## 2022-03-02 NOTE — ANESTHESIA PROCEDURE NOTES
Procedure Performed: Diagnostic Intraop MARIELA     Start Time:        End Time:      Preanesthesia Checklist:  Patient identified, IV assessed, risks and benefits discussed, monitors and equipment assessed, procedure being performed at surgeon's request and anesthesia consent obtained.    General Procedure Information  Diagnostic Indications for Echo:  assessment of surgical repair, defect repair evaluation and hemodynamic monitoring  Physician Requesting Echo: Jr Eliseo eHwitt MD  Location performed:  OR  Intubated  Bite block placed  Heart visualized  Probe Insertion:  Easy  Probe Type:  Multiplane  Modalities:  2D only, continuous wave Doppler, pulse wave Doppler and color flow mapping    Echocardiographic and Doppler Measurements    Ventricles    Right Ventricle:  Cavity size normal.  Thrombus not present.  Global function normal.    Left Ventricle:  Cavity size normal.  Hypertrophy present.  Thrombus not present.  Global Function normal.  Ejection Fraction 50%.          Valves    Aortic Valve:  Annulus normal.  Regurgitation absent.  Leaflets normal.  Leaflet motions normal.      Mitral Valve:  Annulus normal.  Vena Contracta Width: 0.15 cm.  Regurgitation mild.  Leaflets normal.  Leaflet motions normal.      Tricuspid Valve:  Annulus normal.  Regurgitation trace.  Leaflets normal.  Leaflet motions normal.    Pulmonic Valve:  Annulus normal.  Regurgitation trace.          Aorta    Ascending Aorta:  Size normal.  Dissection not present.    Aortic Arch:  Size normal.  Dissection not present.    Descending Aorta:  Size normal.  Dissection not present.          Atria    Right Atrium:  Size normal.    Left Atrium:  Size normal.  Spontaneous echo contrast not present.  Left atrial appendage normal.      Septa    Atrial Septum:  Intra-atrial septal morphology normal.      Ventricular Septum:  Intra-ventricular septum morphology normal.      Diastolic Function Measurements:  Diastolic Dysfunction Grade=  indeterminate  E= ms  A= ms  E/A Ratio=   DT= ms  S/D=  IVRT=    Other Findings  Pericardium:  normal  Pleural Effusion:  none  Pulmonary Arteries:  normal  Pulmonary Venous Flow:  blunted (decreased) systolic flow    Anesthesia Information  Performed Personally  Anesthesiologist:  Garth Burton MD      Echocardiogram Comments:       57 year old male with CP, HTN, HLD and CAD presents for CABG.  - Mildly dilated LV, normal LV function (LVEF 50%, no significant RWMAs)  - Normal RV size, function  - Mild MR (central, brief), trace TR/PI  - No intracardiac thrombus  - No intracardiac shunt  - No pericardial/pleural effusion  - No aortic dissection    S/p 6v CABG:  - Hyperdynamic LV function (LVEF >55%)  - Trace MR  - No pericardial/pleural effusion  - No aortic dissection    Findings discussed with surgical team

## 2022-03-02 NOTE — PERIOPERATIVE NURSING NOTE
Notified TAY Stokes along with Dr. Hewitt that patient did not take antibiotic therapy prescribed for elevated UA results.  They are aware and okay to proceed with today's surgery.  No new orders received.

## 2022-03-02 NOTE — ANESTHESIA PROCEDURE NOTES
Central Line      Patient reassessed immediately prior to procedure    Patient location during procedure: holding area  Start time: 3/2/2022 10:09 AM  Stop Time:3/2/2022 10:14 AM  Indications: vascular access, MD/Surgeon request and central pressure monitoring  Staff  Anesthesiologist: Kaleb Antoine MD  Preanesthetic Checklist  Completed: patient identified, IV checked, risks and benefits discussed, surgical consent, monitors and equipment checked, pre-op evaluation and timeout performed  Central Line Prep  Sterile Tech:cap, gloves, gown, mask and sterile barriers  Prep: chloraprep  Patient monitoring: blood pressure monitoring, continuous pulse oximetry and EKG  Central Line Procedure  Laterality:right  Location:internal jugular  Catheter Type:Cordis  Catheter Size:9 Fr  Guidance:landmark technique  Assessment  Post procedure:biopatch applied, line sutured and occlusive dressing applied  Assessement:blood return through all ports, free fluid flow and Sukhwinder Test  Complications:no  Patient Tolerance:patient tolerated the procedure well with no apparent complications

## 2022-03-03 ENCOUNTER — APPOINTMENT (OUTPATIENT)
Dept: GENERAL RADIOLOGY | Facility: HOSPITAL | Age: 57
End: 2022-03-03

## 2022-03-03 ENCOUNTER — APPOINTMENT (OUTPATIENT)
Dept: CARDIOLOGY | Facility: HOSPITAL | Age: 57
End: 2022-03-03

## 2022-03-03 LAB
ALBUMIN SERPL-MCNC: 3.8 G/DL (ref 3.5–5.2)
ANION GAP SERPL CALCULATED.3IONS-SCNC: 13 MMOL/L (ref 5–15)
BASOPHILS # BLD AUTO: 0.05 10*3/MM3 (ref 0–0.2)
BASOPHILS NFR BLD AUTO: 0.4 % (ref 0–1.5)
BH BB BLOOD EXPIRATION DATE: NORMAL
BH BB BLOOD EXPIRATION DATE: NORMAL
BH BB BLOOD TYPE BARCODE: 5100
BH BB BLOOD TYPE BARCODE: 5100
BH BB DISPENSE STATUS: NORMAL
BH BB DISPENSE STATUS: NORMAL
BH BB PRODUCT CODE: NORMAL
BH BB PRODUCT CODE: NORMAL
BH BB UNIT NUMBER: NORMAL
BH BB UNIT NUMBER: NORMAL
BH CV LOWER ARTERIAL LEFT ABI RATIO: 0.99
BH CV LOWER ARTERIAL LEFT DORSALIS PEDIS SYS MAX: 121 MMHG
BH CV LOWER ARTERIAL LEFT POST TIBIAL SYS MAX: 115 MMHG
BH CV LOWER ARTERIAL LEFT TBI RATIO: 0.8
BH CV LOWER ARTERIAL RIGHT ABI RATIO: 1.15
BH CV LOWER ARTERIAL RIGHT DORSALIS PEDIS SYS MAX: 124 MMHG
BH CV LOWER ARTERIAL RIGHT POST TIBIAL SYS MAX: 140 MMHG
BH CV LOWER ARTERIAL RIGHT TBI RATIO: 1.23
BUN SERPL-MCNC: 26 MG/DL (ref 6–20)
BUN/CREAT SERPL: 22.8 (ref 7–25)
CA-I BLD-MCNC: 5.2 MG/DL (ref 4.6–5.4)
CA-I SERPL ISE-MCNC: 1.31 MMOL/L (ref 1.15–1.35)
CALCIUM SPEC-SCNC: 8.5 MG/DL (ref 8.6–10.5)
CHLORIDE SERPL-SCNC: 111 MMOL/L (ref 98–107)
CO2 SERPL-SCNC: 22 MMOL/L (ref 22–29)
CREAT SERPL-MCNC: 1.14 MG/DL (ref 0.76–1.27)
CROSSMATCH INTERPRETATION: NORMAL
CROSSMATCH INTERPRETATION: NORMAL
DEPRECATED RDW RBC AUTO: 47 FL (ref 37–54)
EGFRCR SERPLBLD CKD-EPI 2021: 76 ML/MIN/1.73
EOSINOPHIL # BLD AUTO: 0 10*3/MM3 (ref 0–0.4)
EOSINOPHIL NFR BLD AUTO: 0 % (ref 0.3–6.2)
ERYTHROCYTE [DISTWIDTH] IN BLOOD BY AUTOMATED COUNT: 13.7 % (ref 12.3–15.4)
GLUCOSE BLDC GLUCOMTR-MCNC: 108 MG/DL (ref 70–130)
GLUCOSE BLDC GLUCOMTR-MCNC: 118 MG/DL (ref 70–130)
GLUCOSE BLDC GLUCOMTR-MCNC: 124 MG/DL (ref 70–130)
GLUCOSE BLDC GLUCOMTR-MCNC: 126 MG/DL (ref 70–130)
GLUCOSE BLDC GLUCOMTR-MCNC: 130 MG/DL (ref 70–130)
GLUCOSE BLDC GLUCOMTR-MCNC: 142 MG/DL (ref 70–130)
GLUCOSE BLDC GLUCOMTR-MCNC: 334 MG/DL (ref 70–130)
GLUCOSE BLDC GLUCOMTR-MCNC: 350 MG/DL (ref 70–130)
GLUCOSE BLDC GLUCOMTR-MCNC: 381 MG/DL (ref 70–130)
GLUCOSE BLDC GLUCOMTR-MCNC: 421 MG/DL (ref 70–130)
GLUCOSE SERPL-MCNC: 114 MG/DL (ref 65–99)
HCT VFR BLD AUTO: 25.9 % (ref 37.5–51)
HGB BLD-MCNC: 8.7 G/DL (ref 13–17.7)
IMM GRANULOCYTES # BLD AUTO: 0.04 10*3/MM3 (ref 0–0.05)
IMM GRANULOCYTES NFR BLD AUTO: 0.3 % (ref 0–0.5)
INR PPP: 1.18 (ref 0.9–1.1)
LYMPHOCYTES # BLD AUTO: 0.79 10*3/MM3 (ref 0.7–3.1)
LYMPHOCYTES NFR BLD AUTO: 6.2 % (ref 19.6–45.3)
MAGNESIUM SERPL-MCNC: 2.8 MG/DL (ref 1.6–2.6)
MAXIMAL PREDICTED HEART RATE: 165 BPM
MCH RBC QN AUTO: 31.2 PG (ref 26.6–33)
MCHC RBC AUTO-ENTMCNC: 33.6 G/DL (ref 31.5–35.7)
MCV RBC AUTO: 92.8 FL (ref 79–97)
MONOCYTES # BLD AUTO: 1.33 10*3/MM3 (ref 0.1–0.9)
MONOCYTES NFR BLD AUTO: 10.5 % (ref 5–12)
NEUTROPHILS NFR BLD AUTO: 10.48 10*3/MM3 (ref 1.7–7)
NEUTROPHILS NFR BLD AUTO: 82.6 % (ref 42.7–76)
NRBC BLD AUTO-RTO: 0 /100 WBC (ref 0–0.2)
PHOSPHATE SERPL-MCNC: 5.4 MG/DL (ref 2.5–4.5)
PLATELET # BLD AUTO: 175 10*3/MM3 (ref 140–450)
PMV BLD AUTO: 9.5 FL (ref 6–12)
POTASSIUM SERPL-SCNC: 4.1 MMOL/L (ref 3.5–5.2)
PROTHROMBIN TIME: 15 SECONDS (ref 11.7–14.2)
QT INTERVAL: 437 MS
RBC # BLD AUTO: 2.79 10*6/MM3 (ref 4.14–5.8)
SODIUM SERPL-SCNC: 146 MMOL/L (ref 136–145)
STRESS TARGET HR: 140 BPM
UNIT  ABO: NORMAL
UNIT  ABO: NORMAL
UNIT  RH: NORMAL
UNIT  RH: NORMAL
UPPER ARTERIAL LEFT ARM BRACHIAL SYS MAX: 122 MMHG
UPPER ARTERIAL RIGHT ARM BRACHIAL SYS MAX: 107 MMHG
WBC NRBC COR # BLD: 12.69 10*3/MM3 (ref 3.4–10.8)

## 2022-03-03 PROCEDURE — 25010000002 ENOXAPARIN PER 10 MG: Performed by: NURSE PRACTITIONER

## 2022-03-03 PROCEDURE — 71045 X-RAY EXAM CHEST 1 VIEW: CPT

## 2022-03-03 PROCEDURE — 73630 X-RAY EXAM OF FOOT: CPT

## 2022-03-03 PROCEDURE — 97162 PT EVAL MOD COMPLEX 30 MIN: CPT

## 2022-03-03 PROCEDURE — 85025 COMPLETE CBC W/AUTO DIFF WBC: CPT | Performed by: NURSE PRACTITIONER

## 2022-03-03 PROCEDURE — 82962 GLUCOSE BLOOD TEST: CPT

## 2022-03-03 PROCEDURE — 93923 UPR/LXTR ART STDY 3+ LVLS: CPT

## 2022-03-03 PROCEDURE — 80069 RENAL FUNCTION PANEL: CPT | Performed by: NURSE PRACTITIONER

## 2022-03-03 PROCEDURE — 25010000002 CALCIUM GLUCONATE-NACL 1-0.675 GM/50ML-% SOLUTION: Performed by: NURSE PRACTITIONER

## 2022-03-03 PROCEDURE — P9041 ALBUMIN (HUMAN),5%, 50ML: HCPCS | Performed by: NURSE PRACTITIONER

## 2022-03-03 PROCEDURE — 93005 ELECTROCARDIOGRAM TRACING: CPT | Performed by: NURSE PRACTITIONER

## 2022-03-03 PROCEDURE — 82330 ASSAY OF CALCIUM: CPT | Performed by: NURSE PRACTITIONER

## 2022-03-03 PROCEDURE — 25010000002 ONDANSETRON PER 1 MG: Performed by: NURSE PRACTITIONER

## 2022-03-03 PROCEDURE — 25010000002 ALBUMIN HUMAN 5% PER 50 ML: Performed by: NURSE PRACTITIONER

## 2022-03-03 PROCEDURE — 93922 UPR/L XTREMITY ART 2 LEVELS: CPT

## 2022-03-03 PROCEDURE — 0 CEFAZOLIN IN DEXTROSE 2-4 GM/100ML-% SOLUTION: Performed by: NURSE PRACTITIONER

## 2022-03-03 PROCEDURE — 83735 ASSAY OF MAGNESIUM: CPT | Performed by: NURSE PRACTITIONER

## 2022-03-03 PROCEDURE — 97530 THERAPEUTIC ACTIVITIES: CPT

## 2022-03-03 PROCEDURE — 85610 PROTHROMBIN TIME: CPT | Performed by: NURSE PRACTITIONER

## 2022-03-03 PROCEDURE — 25010000002 METOCLOPRAMIDE PER 10 MG: Performed by: NURSE PRACTITIONER

## 2022-03-03 PROCEDURE — 63710000001 INSULIN LISPRO (HUMAN) PER 5 UNITS: Performed by: NURSE PRACTITIONER

## 2022-03-03 PROCEDURE — 25010000002 CEFAZOLIN IN DEXTROSE 2-4 GM/100ML-% SOLUTION: Performed by: NURSE PRACTITIONER

## 2022-03-03 RX ORDER — SODIUM CHLORIDE 9 MG/ML
30 INJECTION, SOLUTION INTRAVENOUS CONTINUOUS PRN
Status: DISCONTINUED | OUTPATIENT
Start: 2022-03-03 | End: 2022-03-03

## 2022-03-03 RX ORDER — CALCIUM GLUCONATE 20 MG/ML
2 INJECTION, SOLUTION INTRAVENOUS ONCE
Status: COMPLETED | OUTPATIENT
Start: 2022-03-03 | End: 2022-03-03

## 2022-03-03 RX ORDER — SODIUM CHLORIDE 0.9 % (FLUSH) 0.9 %
30 SYRINGE (ML) INJECTION ONCE AS NEEDED
Status: DISCONTINUED | OUTPATIENT
Start: 2022-03-03 | End: 2022-03-03

## 2022-03-03 RX ORDER — GUAIFENESIN 600 MG/1
1200 TABLET, EXTENDED RELEASE ORAL EVERY 12 HOURS SCHEDULED
Status: DISCONTINUED | OUTPATIENT
Start: 2022-03-03 | End: 2022-03-07 | Stop reason: HOSPADM

## 2022-03-03 RX ORDER — INSULIN LISPRO 100 [IU]/ML
0-9 INJECTION, SOLUTION INTRAVENOUS; SUBCUTANEOUS
Status: DISCONTINUED | OUTPATIENT
Start: 2022-03-03 | End: 2022-03-07 | Stop reason: HOSPADM

## 2022-03-03 RX ORDER — ALBUMIN, HUMAN INJ 5% 5 %
250 SOLUTION INTRAVENOUS ONCE
Status: COMPLETED | OUTPATIENT
Start: 2022-03-03 | End: 2022-03-03

## 2022-03-03 RX ORDER — DEXTROSE MONOHYDRATE 25 G/50ML
25 INJECTION, SOLUTION INTRAVENOUS
Status: DISCONTINUED | OUTPATIENT
Start: 2022-03-03 | End: 2022-03-07 | Stop reason: HOSPADM

## 2022-03-03 RX ORDER — INSULIN LISPRO 100 [IU]/ML
6 INJECTION, SOLUTION INTRAVENOUS; SUBCUTANEOUS
Status: DISCONTINUED | OUTPATIENT
Start: 2022-03-04 | End: 2022-03-05

## 2022-03-03 RX ORDER — NICOTINE POLACRILEX 4 MG
15 LOZENGE BUCCAL
Status: DISCONTINUED | OUTPATIENT
Start: 2022-03-03 | End: 2022-03-07 | Stop reason: HOSPADM

## 2022-03-03 RX ORDER — DEXTROSE MONOHYDRATE 25 G/50ML
25-50 INJECTION, SOLUTION INTRAVENOUS
Status: DISCONTINUED | OUTPATIENT
Start: 2022-03-03 | End: 2022-03-03

## 2022-03-03 RX ADMIN — DOCUSATE SODIUM 50MG AND SENNOSIDES 8.6MG 2 TABLET: 8.6; 5 TABLET, FILM COATED ORAL at 20:19

## 2022-03-03 RX ADMIN — PANTOPRAZOLE SODIUM 40 MG: 40 TABLET, DELAYED RELEASE ORAL at 06:37

## 2022-03-03 RX ADMIN — METOCLOPRAMIDE HYDROCHLORIDE 10 MG: 5 INJECTION INTRAMUSCULAR; INTRAVENOUS at 09:54

## 2022-03-03 RX ADMIN — HYDROCODONE BITARTRATE AND ACETAMINOPHEN 2 TABLET: 5; 325 TABLET ORAL at 20:19

## 2022-03-03 RX ADMIN — HYDROCODONE BITARTRATE AND ACETAMINOPHEN 2 TABLET: 5; 325 TABLET ORAL at 13:37

## 2022-03-03 RX ADMIN — INSULIN LISPRO 9 UNITS: 100 INJECTION, SOLUTION INTRAVENOUS; SUBCUTANEOUS at 20:19

## 2022-03-03 RX ADMIN — INSULIN GLARGINE-YFGN 15 UNITS: 100 INJECTION, SOLUTION SUBCUTANEOUS at 20:21

## 2022-03-03 RX ADMIN — ALBUMIN HUMAN 250 ML: 0.05 INJECTION, SOLUTION INTRAVENOUS at 04:50

## 2022-03-03 RX ADMIN — MUPIROCIN 1 APPLICATION: 20 OINTMENT TOPICAL at 08:29

## 2022-03-03 RX ADMIN — INSULIN LISPRO 7 UNITS: 100 INJECTION, SOLUTION INTRAVENOUS; SUBCUTANEOUS at 12:01

## 2022-03-03 RX ADMIN — CALCIUM GLUCONATE 2 G: 20 INJECTION, SOLUTION INTRAVENOUS at 08:30

## 2022-03-03 RX ADMIN — ATORVASTATIN CALCIUM 40 MG: 20 TABLET, FILM COATED ORAL at 20:19

## 2022-03-03 RX ADMIN — CHLORHEXIDINE GLUCONATE 15 ML: 1.2 RINSE ORAL at 20:18

## 2022-03-03 RX ADMIN — METOCLOPRAMIDE HYDROCHLORIDE 10 MG: 5 INJECTION INTRAMUSCULAR; INTRAVENOUS at 04:50

## 2022-03-03 RX ADMIN — CEFAZOLIN SODIUM 2 G: 2 INJECTION, SOLUTION INTRAVENOUS at 21:41

## 2022-03-03 RX ADMIN — GUAIFENESIN 1200 MG: 600 TABLET, EXTENDED RELEASE ORAL at 08:29

## 2022-03-03 RX ADMIN — HYDROCODONE BITARTRATE AND ACETAMINOPHEN 2 TABLET: 5; 325 TABLET ORAL at 05:09

## 2022-03-03 RX ADMIN — ALBUMIN HUMAN 250 ML: 0.05 INJECTION, SOLUTION INTRAVENOUS at 08:29

## 2022-03-03 RX ADMIN — CYCLOBENZAPRINE 10 MG: 10 TABLET, FILM COATED ORAL at 20:19

## 2022-03-03 RX ADMIN — CEFAZOLIN SODIUM 2 G: 2 INJECTION, SOLUTION INTRAVENOUS at 13:37

## 2022-03-03 RX ADMIN — GUAIFENESIN 1200 MG: 600 TABLET, EXTENDED RELEASE ORAL at 20:19

## 2022-03-03 RX ADMIN — INSULIN LISPRO 8 UNITS: 100 INJECTION, SOLUTION INTRAVENOUS; SUBCUTANEOUS at 17:32

## 2022-03-03 RX ADMIN — HYDROCODONE BITARTRATE AND ACETAMINOPHEN 2 TABLET: 5; 325 TABLET ORAL at 00:21

## 2022-03-03 RX ADMIN — HYDROCODONE BITARTRATE AND ACETAMINOPHEN 2 TABLET: 5; 325 TABLET ORAL at 09:03

## 2022-03-03 RX ADMIN — MUPIROCIN 1 APPLICATION: 20 OINTMENT TOPICAL at 20:19

## 2022-03-03 RX ADMIN — ENOXAPARIN SODIUM 40 MG: 100 INJECTION SUBCUTANEOUS at 17:04

## 2022-03-03 RX ADMIN — CHLORHEXIDINE GLUCONATE 15 ML: 1.2 RINSE ORAL at 08:29

## 2022-03-03 RX ADMIN — ASPIRIN 81 MG: 81 TABLET, COATED ORAL at 08:29

## 2022-03-03 RX ADMIN — CEFAZOLIN SODIUM 2 G: 2 INJECTION, SOLUTION INTRAVENOUS at 06:38

## 2022-03-03 RX ADMIN — ONDANSETRON 4 MG: 2 INJECTION INTRAMUSCULAR; INTRAVENOUS at 09:03

## 2022-03-03 NOTE — PAYOR COMM NOTE
"Grecia Barrera (57 y.o. Male)                       ATTENTION;   CASE REF #  [id number]  - PLEASE FORWARD TO NURSE FOR REVIEW,                        OPERATIVE REPORT INCLUDED , REPLY TO [facility] DEPT  [phone] OR CALL                  Date of Birth Social Security Number Address Home Phone MRN    1965  [id number] [address] [phone] [id number]    Sabianist Marital Status             None        Admission Date Admission Type Admitting Provider Attending Provider Department, Room/Bed    3/2/22 Elective Jr Eliseo Hewitt MD Pollock, Jr Samuel B, MD [facility] CARDIO RECOVERY, 2002/1    Discharge Date Discharge Disposition Discharge Destination                         Attending Provider: Jr Eliseo Hewitt MD    Allergies: No Known Allergies    Isolation: None   Infection: None   Code Status: CPR   Advance Care Planning Activity    Ht: 177.8 cm (70\")   Wt: 66.8 kg (147 lb 4.3 oz)    Admission Cmt: None   Principal Problem: Coronary artery disease involving native coronary artery of native heart without angina pectoris [I25.10]                 Active Insurance as of 3/2/2022     Primary Coverage     Payor Plan Insurance Group Employer/Plan Group    [facility] MEDICARE REPLACEMENT [employer] MEDICARE REPLACEMENT      Payor Plan Address Payor Plan Phone Number Payor Plan Fax Number Effective Dates    [address] [address] [phone]  1/1/2022 - None Entered    [facility] [id number]       Subscriber Name Subscriber Birth Date Member ID       GRECIA BARRERA 1965 [id number]                 Emergency Contacts      (Rel.) Home Phone Work Phone Mobile Phone    REINIER BARRERA (Sister) [phone] -- [phone]    Frances, Unknown (Mother) -- -- [phone]               History & Physical      Juliana Mendez APRN at 02/28/22 1307     Attestation signed by Jr Eliseo Hewitt MD at 02/28/22 1501    I have reviewed this " documentation and agree.                            Encounter Information    Encounter Information    Provider Department Encounter #   2/24/2022 11:00 AM Jr Eliseo Hewitt MD [facility] CARDIAC [facility] [id number]     Jr Eliseo Hewitt MD   Physician   Specialty:  Cardiothoracic Surgery   Progress Notes       Signed   Encounter Date:  2/24/2022                 Signed                 I saw him today back in the office.  He has gained weight and is now near his baseline 145 pounds.  He has been exercising on the bicycle.  I think he is now ready for surgery and we will set this up for next week.  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  He had it when he was younger with some improvement.  He has a nonhealing ulcer on his right foot and I am going to have vascular see him.  We will plan for surgery next week.                  2/28/2022 addendum H&P: I saw Mr. Morse in [facility] today.  He states there have been no changes to his health history since he last saw Dr. Hewitt in the office 4 days ago.  His preop studies are within normal limits with the exception of 3+ leukocytes and too many to count WBCs, no bacteria in his urine, I have sent a prescription for Bactrim DS to be taken in the interim before surgery.  His hemoglobin A1c was elevated at 8.6, patient states that his primary care is titrating his medications and he has been more diligent regarding his diet and exercise.  I spent a significant amount of time discussing the expected postoperative course and answering any questions that the patient may have had.  Studies pending are his carotid and vein mapping.    Primary history: DM 2, CVA with peripheral vision disturbance, cerebral palsy with left-sided weakness, hypertension    Social history: Lives with his mother and father preop and for at least 2 weeks postop, has had no alcohol use for 6 months and quit smoking 1 year ago after a 34-pack-year  history.            Electronically signed by Jr Eliseo Hewitt MD at 02/28/22 6167       Vital Signs (last day)     Date/Time Temp Temp src Pulse Resp BP Patient Position SpO2    03/03/22 0800 96.6 (35.9) Bladder 89 12 94/59 Sitting 95    03/03/22 0700 -- -- 89 -- 99/60 -- 95    03/03/22 0600 96.8 (36) -- 89 18 93/58 -- 97    03/03/22 0500 96.44 (35.8) -- 89 16 106/72 -- 96    03/03/22 0400 96.44 (35.8) -- 85 16 100/67 -- 96    03/03/22 0300 96.44 (35.8) -- 80 14 96/65 -- 98    03/03/22 0200 96.26 (35.7) -- 80 14 94/60 -- 96    03/03/22 0100 96.08 (35.6) -- 80 16 92/63 -- 96    03/03/22 0000 96.26 (35.7) -- 80 18 96/66 -- 94    03/02/22 2300 96.44 (35.8) -- 80 16 94/69 -- 100    03/02/22 2200 96.8 (36) -- 80 16 96/66 -- 100    03/02/22 2100 96.98 (36.1) -- 80 12 96/63 -- 100    03/02/22 2000 97.16 (36.2) -- 64 10 90/61 -- 99    03/02/22 1916 97.52 (36.4) -- 78 -- -- -- 100    03/02/22 1904 97.52 (36.4) -- 78 -- -- -- 99    03/02/22 1900 97.7 (36.5) -- 81 -- 99/70 -- 100    03/02/22 1825 97.16 (36.2) -- 73 -- -- -- 100    03/02/22 1800 97.16 (36.2) -- 76 -- 103/70 -- 99    03/02/22 1710 97.16 (36.2) -- 84 -- -- -- 99    03/02/22 1700 97.16 (36.2) -- 80 -- -- -- 99    03/02/22 1630 97.16 (36.2) -- 79 -- -- -- 99    03/02/22 1600 96.98 (36.1) -- 73 -- -- -- 98    03/02/22 1550 97.16 (36.2) -- 80 -- -- -- 100    03/02/22 1545 97.52 (36.4) -- 90 -- -- -- 100    03/02/22 1535 -- -- 80 14 -- -- 100    03/02/22 1530 -- -- -- -- 121/50 -- --    03/02/22 10:22:33 -- -- 75 -- 120/52 -- 100    03/02/22 10:16:30 -- -- 75 -- 106/47 -- 100    03/02/22 10:11:17 -- -- 72 -- 119/45 -- 100    03/02/22 10:01:52 -- -- 73 16 102/38 Lying 99    03/02/22 09:55:18 -- -- 79 -- 138/81 -- 100          Oxygen Therapy (last day)     Date/Time SpO2 Device (Oxygen Therapy) Flow (L/min) Oxygen Concentration (%) ETCO2 (mmHg)    03/03/22 0800 95 room air -- -- --    03/03/22 0700 95 -- -- -- --    03/03/22 0600 97 -- -- -- --    03/03/22 0500 96  -- -- -- --    03/03/22 0400 96 room air -- -- --    03/03/22 0300 98 -- -- -- --    03/03/22 0200 96 -- -- -- --    03/03/22 0100 96 -- -- -- --    03/03/22 0000 94 room air -- -- --    03/02/22 2300 100 -- -- -- --    03/02/22 2200 100 -- -- -- --    03/02/22 2100 100 -- -- -- --    03/02/22 2000 99 -- -- -- --    03/02/22 1945 -- nasal cannula 4 -- --    03/02/22 1916 100 nasal cannula 4 -- --    03/02/22 1904 99 -- -- -- --    03/02/22 1900 100 -- -- -- --    03/02/22 1825 100 -- -- -- --    03/02/22 1800 99 -- -- -- --    03/02/22 1710 99 -- -- -- --    03/02/22 1700 99 -- -- -- --    03/02/22 1652 -- -- -- 40 --    03/02/22 1630 99 -- -- -- --    03/02/22 1600 98 ventilator -- 100 --    03/02/22 1550 100 -- -- -- --    03/02/22 1545 100 -- -- -- --    03/02/22 1535 100 ventilator -- -- --    03/02/22 10:22:33 100 -- -- -- --    03/02/22 10:16:30 100 -- -- -- --    03/02/22 10:11:17 100 -- -- -- --    03/02/22 10:01:52 99 -- -- -- --    03/02/22 09:55:18 100 -- -- -- --          Intake & Output (last day)       03/02 0701 03/03 0700 03/03 0701 03/04 0700    P.O.  240    I.V. (mL/kg) 809 (12.1)     Blood 650     IV Piggyback 700     Total Intake(mL/kg) 2159 (32.3) 240 (3.6)    Urine (mL/kg/hr) 1930 70 (0.5)    Other 2000     Blood 250     Chest Tube 205 20    Total Output 4385 90    Net -2226 +150              Lines, Drains & Airways     Active LDAs     Name Placement date Placement time Site Days    Pulmonary Artery Catheter - Triple Lumen 03/02/22 Right Internal jugular 03/02/22  1021  created via procedure documentation   less than 1    CVC Double Lumen 03/02/22 Right Internal jugular 03/02/22  1009  created via procedure documentation  Internal jugular  less than 1    Peripheral IV 03/02/22 0950 Anterior; Distal; Right Forearm 03/02/22  0950  Forearm  less than 1    Urethral Catheter Non-latex; Temperature probe 16 Fr. 03/02/22  1115   less than 1    Y Chest Tube 1 and 2 1 Right Mediastinal 28 Fr. 2 Left  Pleural 32 Fr. 22  1438   less than 1    Arterial Line 22 Left Radial 22  0956  created via procedure documentation  Radial  less than 1          Inactive LDAs     Name Placement date Placement time Removal date Removal time Site Days    [REMOVED] ETT  22  1104  created via procedure documentation  22  1904   less than 1                  Facility-Administered Medications as of 3/3/2022   Medication Dose Route Frequency Provider Last Rate Last Admin   • [] acetaminophen (TYLENOL) tablet 650 mg  650 mg Oral Q4H Tana Harris APRN        Or   • [] acetaminophen (TYLENOL) 160 MG/5ML solution 650 mg  650 mg Oral Q4H Taan Harris APRN        Or   • [] acetaminophen (TYLENOL) suppository 650 mg  650 mg Rectal Q4H Tana Harris APRN       • acetaminophen (TYLENOL) tablet 650 mg  650 mg Oral Q4H PRN Tana Harris APRN        Or   • acetaminophen (TYLENOL) 160 MG/5ML solution 650 mg  650 mg Oral Q4H PRN Tana Harris APRN        Or   • acetaminophen (TYLENOL) suppository 650 mg  650 mg Rectal Q4H PRN Tana Harris APRN       • albumin human 5 % solution 1,500 mL  1,500 mL Intravenous PRN Tana Harris APRN   250 mL at 22 0450   • [COMPLETED] albumin human 5 % solution 250 mL  250 mL Intravenous Once Tana Harris APRN   250 mL at 22 0829   • ALPRAZolam (XANAX) tablet 0.25 mg  0.25 mg Oral Q8H PRN Tana Harris APRN       • aspirin EC tablet 81 mg  81 mg Oral Daily Tana Harris APRN   81 mg at 22 0829   • atorvastatin (LIPITOR) tablet 40 mg  40 mg Oral Nightly Tana Harris APRN   40 mg at 22 2110   • bisacodyl (DULCOLAX) EC tablet 10 mg  10 mg Oral Daily PRN Tana Harris APRN       • bisacodyl (DULCOLAX) suppository 10 mg  10 mg Rectal Daily PRN Tana Harris APRN       • [COMPLETED] calcium gluconate 1g/50ml 0.675% NaCl IV SOLN  2 g Intravenous Once Tana Harris APRN   2 g at 22 0830    • [COMPLETED] ceFAZolin in dextrose (ANCEF) IVPB solution 2 g  2 g Intravenous On Call to OR Tana Harris APRN   2 g at 03/02/22 1424   • ceFAZolin in dextrose (ANCEF) IVPB solution 2 g  2 g Intravenous Q8H Tana Harris APRN   2 g at 03/03/22 0638   • chlorhexidine (PERIDEX) 0.12 % solution 15 mL  15 mL Mouth/Throat Q12H Tana Harris APRN   15 mL at 03/03/22 0829   • clevidipine (CLEVIPREX) infusion 0.5 mg/mL  2-32 mg/hr Intravenous Continuous PRN Tana Harris APRN       • cyclobenzaprine (FLEXERIL) tablet 10 mg  10 mg Oral Q8H PRN Tana Harris APRN       • dexmedetomidine (PRECEDEX) 400 mcg in 100 mL NS infusion  0.2-1.5 mcg/kg/hr Intravenous Titrated Tana Harris APRN   Stopped at 03/02/22 1730   • DOPamine 400 mg in 250 mL D5W infusion  2-20 mcg/kg/min Intravenous Continuous PRN Tana Harris APRN       • enoxaparin (LOVENOX) syringe 40 mg  40 mg Subcutaneous Daily Tana Harris APRN       • EPINEPHrine 5 mg in 250 mL NS infusion  0.02-0.2 mcg/kg/min Intravenous Continuous PRN Tana Harris APRN       • [COMPLETED] famotidine (PEPCID) injection 20 mg  20 mg Intravenous Once aKleb Antoine MD   20 mg at 03/02/22 1017   • [COMPLETED] fentaNYL citrate (PF) (SUBLIMAZE) injection   Intravenous Code / Trauma / Sedation Medication Kaleb Antoine MD   50 mcg at 03/02/22 0958   • guaiFENesin (MUCINEX) 12 hr tablet 1,200 mg  1,200 mg Oral Q12H Tana Harris APRN   1,200 mg at 03/03/22 0829   • HYDROcodone-acetaminophen (NORCO) 5-325 MG per tablet 2 tablet  2 tablet Oral Q4H PRN Tana Harris APRN   2 tablet at 03/03/22 0903   • insulin glargine (LANTUS, SEMGLEE) injection 15 Units  15 Units Subcutaneous Nightly Tana Harris APRN       • insulin regular 1 unit/mL in 0.9% sodium chloride  0-50 Units/hr Intravenous Continuous PRN Tana Harris APRN   Stopped at 03/03/22 0645   • magnesium hydroxide (MILK OF MAGNESIA) suspension 10 mL  10 mL Oral Daily  PRN Tana Harris APRN       • magnesium sulfate in D5W 1g/100mL (PREMIX)  1 g Intravenous Q8H Tana Harris APRN       • [] meperidine (DEMEROL) injection 25 mg  25 mg Intravenous Q4H PRN Tana Harris APRN       • [COMPLETED] methadone (DOLOPHINE) injection 10 mg  10 mg Intravenous Once PRN Garth Burton MD   10 mg at 22 1136   • metoclopramide (REGLAN) injection 10 mg  10 mg Intravenous Q6H Tana Harris APRN   10 mg at 22 0450   • metoprolol tartrate (LOPRESSOR) tablet 25 mg  25 mg Oral Q12H Tana Harris APRN       • midazolam (VERSED) injection 2 mg  2 mg Intravenous Q1H PRN Tana Harris APRN       • [COMPLETED] midazolam (VERSED) injection   Intravenous Code / Trauma / Sedation Medication Kaleb Antoine MD   1 mg at 22 1005   • milrinone (PRIMACOR) 20 mg in 100 mL D5W infusion  0.25-0.375 mcg/kg/min Intravenous Continuous PRN Tana Harris APRN       • morphine injection 1 mg  1 mg Intravenous Q4H PRN Tana Harris APRN        And   • naloxone (NARCAN) injection 0.4 mg  0.4 mg Intravenous Q5 Min PRN Tana Harris APRN       • morphine injection 4 mg  4 mg Intravenous Q30 Min PRN Tana Harris APRN       • mupirocin (BACTROBAN) 2 % nasal ointment   Each Nare BID Tana Harris APRN   1 application at 22 0829   • niCARdipine (CARDENE) 25 mg in 250 mL NS infusion kit  5-15 mg/hr Intravenous Continuous PRN Tana Harris APRN 30 mL/hr at 22 1652 3 mg/hr at 22 1652   • nitroglycerin (TRIDIL) 200 mcg/ml infusion  5-200 mcg/min Intravenous Titrated Tana Harris APRN       • norepinephrine (LEVOPHED) 8 mg in 250 mL NS infusion (premix)  0.02-0.3 mcg/kg/min Intravenous Continuous PRN Tana Harris APRN       • ondansetron (ZOFRAN) injection 4 mg  4 mg Intravenous Q6H PRN Tana Harris APRN   4 mg at 22 0903   • oxyCODONE (ROXICODONE) immediate release tablet 10 mg  10 mg Oral Q4H PRN Kimberly  TAY Fajardo       • [COMPLETED] pantoprazole (PROTONIX) injection 40 mg  40 mg Intravenous Once Tana Harris APRN   40 mg at 03/02/22 1634    Followed by   • pantoprazole (PROTONIX) EC tablet 40 mg  40 mg Oral QAM Tana Harris APRN   40 mg at 03/03/22 0637   • phenylephrine (ESME-SYNEPHRINE) 50 mg in 250 mL NS infusion  0.2-2 mcg/kg/min Intravenous Continuous PRN Tana Harris APRN       • polyethylene glycol (MIRALAX) packet 17 g  17 g Oral Daily PRN Tana Harris APRN       • potassium chloride (K-DUR,KLOR-CON) ER tablet 40 mEq  40 mEq Oral PRN Tana Harris APRN        Or   • potassium chloride (KLOR-CON) packet 40 mEq  40 mEq Oral PRN Tana Harris APRN       • potassium chloride 10 mEq in 100 mL IVPB  10 mEq Intravenous Q1H PRN Tana Harris APRN        Or   • potassium chloride 10 mEq in 100 mL IVPB  10 mEq Intravenous Q1H PRN Tana Harris APRN       • potassium chloride 20 mEq in 50 mL IVPB  20 mEq Intravenous Q1H PRN Tana Harris APRN       • potassium chloride 20 mEq in 50 mL IVPB  20 mEq Intravenous Q1H PRN Tana Harris APRN       • potassium chloride 20 mEq in 50 mL IVPB  20 mEq Intravenous Q1H PRN Tana Harris APRN       • propofol (DIPRIVAN) infusion 10 mg/mL 100 mL  5-50 mcg/kg/min Intravenous Continuous PRN Tana Harris APRN   Stopped at 03/02/22 1716   • sennosides-docusate (PERICOLACE) 8.6-50 MG per tablet 2 tablet  2 tablet Oral Nightly Tana Harris APRN       • [COMPLETED] sodium bicarbonate injection 8.4% 100 mEq  100 mEq Intravenous Once Kurt Nagel MD   100 mEq at 03/02/22 1818   • sodium chloride 0.9 % flush 30 mL  30 mL Intravenous Once PRN Tana Harris APRN       • sodium chloride 0.9 % infusion  30 mL/hr Intravenous Continuous Tana Harris APRN 15 mL/hr at 03/02/22 1632 15 mL/hr at 03/02/22 1632   • sodium chloride 0.9 % infusion  30 mL/hr Intravenous Continuous PRN Tana Harris APRN         Orders (last 24  hrs)      Start     Ordered    03/05/22 0600  Clean Midsternal Incision & Chest Tube Sites With Hibiclens Beginning on POD 3  Daily       03/02/22 1530    03/05/22 0600  XR Chest PA & Lateral  1 Time Imaging         03/02/22 1530    03/04/22 1200  Remove Midsternal Dressing on POD 3. Patient May Shower With Dressing On. If Dressing Becomes Loose or Wet Remove on POD 2  Once         03/02/22 1530    03/04/22 1200  Leave Incisions Open to Air Unless Draining or Edges Are Not Approximated  Continuous         03/02/22 1530    03/04/22 0600  Change Chest Dressing Daily While Intubated  Daily       03/02/22 1530    03/04/22 0600  Incision Care - Cleanse With Normal Saline & 4x4 Gauze Using Sterile Technique  Daily       03/02/22 1530    03/04/22 0600  Chest Tube & Pacing Wire Sites - Cleanse With Normal Saline & 4x4 Gauze Using Sterile Technique  Daily       03/02/22 1530    03/04/22 0600  CBC (No Diff)  Daily       03/02/22 1530 03/04/22 0600  Basic Metabolic Panel  Daily       03/02/22 1530    03/03/22 2100  sennosides-docusate (PERICOLACE) 8.6-50 MG per tablet 2 tablet  Nightly         03/02/22 1530    03/03/22 2100  metoprolol tartrate (LOPRESSOR) tablet 25 mg  Every 12 Hours Scheduled         03/03/22 0826    03/03/22 2100  insulin glargine (LANTUS, SEMGLEE) injection 15 Units  Nightly         03/03/22 0829    03/03/22 1800  enoxaparin (LOVENOX) syringe 40 mg  Daily         03/02/22 1530 03/03/22 1400  Intake & Output  Every Shift       03/02/22 1530    03/03/22 1200  Vital Signs  Every 4 Hours      Comments: Perform Vitals Less Frequently Only if Patient Stable      03/02/22 1530    03/03/22 0915  calcium gluconate 1g/50ml 0.675% NaCl IV SOLN  Once         03/03/22 0821    03/03/22 0915  albumin human 5 % solution 250 mL  Once         03/03/22 0821    03/03/22 0915  guaiFENesin (MUCINEX) 12 hr tablet 1,200 mg  Every 12 Hours Scheduled         03/03/22 0825    03/03/22 0900  aspirin EC tablet 81 mg  Daily       "   03/02/22 1530    03/03/22 0900  metoprolol tartrate (LOPRESSOR) tablet 12.5 mg  Every 12 Hours Scheduled,   Status:  Discontinued         03/02/22 1530 03/03/22 0830  Oscillating Positive Expiratory Pressure (OPEP)  4 Times Daily - RT       03/03/22 0825    03/03/22 0827  Transfer Patient  Once         03/03/22 0828    03/03/22 0800  Wean & Extubate Per Rapid Wean and Extubation Protocol  Every Morning,   Status:  Canceled       03/02/22 1530 03/03/22 0750  acetaminophen (TYLENOL) tablet 650 mg  Every 4 Hours PRN        \"Or\" Linked Group Details    03/02/22 1530    03/03/22 0750  acetaminophen (TYLENOL) 160 MG/5ML solution 650 mg  Every 4 Hours PRN        \"Or\" Linked Group Details    03/02/22 1530    03/03/22 0750  acetaminophen (TYLENOL) suppository 650 mg  Every 4 Hours PRN        \"Or\" Linked Group Details    03/02/22 1530    03/03/22 0728  Inpatient Vascular Surgery Consult  Once        Specialty:  Vascular Surgery  Provider:  (Not yet assigned)    03/03/22 0727    03/03/22 0728  Code Status and Medical Interventions:  Continuous         03/03/22 0727    03/03/22 0700  pantoprazole (PROTONIX) EC tablet 40 mg  Every Morning        \"Followed by\" Linked Group Details    03/02/22 1530    03/03/22 0611  POC Glucose Once  PROCEDURE ONCE         03/03/22 0608    03/03/22 0600  ceFAZolin in dextrose (ANCEF) IVPB solution 2 g  On Call to O.R.         03/02/22 0927    03/03/22 0600  metoprolol tartrate (LOPRESSOR) tablet 12.5 mg  On Call to O.R.,   Status:  Discontinued         03/02/22 0927    03/03/22 0600  XR Chest 1 View  Daily       03/02/22 1530 03/03/22 0600  ECG 12 Lead  Daily       03/02/22 1530    03/03/22 0600  RN to Place Order For STAT Chest X-Ray When Chest Tubes Are Removed  Once        Comments: No chest X-ray needed for chest tubes with bulb drain    03/02/22 1530 03/03/22 0459  POC Glucose Once  PROCEDURE ONCE         03/03/22 0456    03/03/22 0409  POC Glucose Once  PROCEDURE ONCE         " 03/03/22 0358    03/03/22 0311  POC Glucose Once  PROCEDURE ONCE         03/03/22 0309    03/03/22 0300  Calcium, Ionized  Once         03/02/22 1530    03/03/22 0300  CBC & Differential  Once         03/02/22 1530    03/03/22 0300  Renal Function Panel  Once         03/02/22 1530    03/03/22 0300  Magnesium  Once         03/02/22 1530    03/03/22 0300  Protime-INR  Once         03/02/22 1530    03/03/22 0300  CBC Auto Differential  PROCEDURE ONCE         03/02/22 1901    03/03/22 0229  POC Glucose Once  PROCEDURE ONCE         03/03/22 0217    03/03/22 0113  POC Glucose Once  PROCEDURE ONCE         03/03/22 0111    03/03/22 0000  bisacodyl (DULCOLAX) suppository 10 mg  Daily PRN         03/02/22 1530    03/03/22 0000  magnesium hydroxide (MILK OF MAGNESIA) suspension 10 mL  Daily PRN         03/02/22 1530    03/03/22 0000  cyclobenzaprine (FLEXERIL) tablet 10 mg  Every 8 Hours PRN         03/02/22 1530    03/02/22 2359  POC Glucose Once  PROCEDURE ONCE         03/02/22 2356    03/02/22 2330  Patient May Use Home CPAP / BIPAP For Sleep  At Bedtime - RT       03/02/22 1530    03/02/22 2308  POC Glucose Once  PROCEDURE ONCE         03/02/22 2305    03/02/22 2230  ceFAZolin in dextrose (ANCEF) IVPB solution 2 g  Every 8 Hours         03/02/22 1530    03/02/22 2207  POC Glucose Once  PROCEDURE ONCE         03/02/22 2205    03/02/22 2106  POC Glucose Once  PROCEDURE ONCE         03/02/22 2102    03/02/22 2106  POC Glucose Once  PROCEDURE ONCE         03/02/22 2103    03/02/22 2100  mupirocin (BACTROBAN) 2 % nasal ointment  2 Times Daily         03/02/22 1530    03/02/22 2100  chlorhexidine (PERIDEX) 0.12 % solution 15 mL  Every 12 Hours         03/02/22 1530    03/02/22 2100  atorvastatin (LIPITOR) tablet 40 mg  Nightly         03/02/22 1530    03/02/22 2003  POC Glucose Once  PROCEDURE ONCE         03/02/22 1955 03/02/22 1904  Blood Gas, Arterial -  PROCEDURE ONCE         03/02/22 1900    03/02/22 1900  sodium  bicarbonate injection 8.4% 100 mEq  Once         03/02/22 1806    03/02/22 1855  POC Glucose Once  PROCEDURE ONCE         03/02/22 1852    03/02/22 1818  POC Glucose Once  PROCEDURE ONCE         03/02/22 1800    03/02/22 1806  Blood Gas, Arterial -  PROCEDURE ONCE         03/02/22 1801    03/02/22 1800  Ambulate Patient  3 Times Daily         03/02/22 1530    03/02/22 1703  POC Glucose Once  PROCEDURE ONCE         03/02/22 1700    03/02/22 1630  sodium chloride 0.9 % infusion  Continuous         03/02/22 1530    03/02/22 1630  nitroglycerin (TRIDIL) 200 mcg/ml infusion  Titrated         03/02/22 1530    03/02/22 1630  dexmedetomidine (PRECEDEX) 400 mcg in 100 mL NS infusion  Titrated         03/02/22 1530    03/02/22 1630  magnesium sulfate in D5W 1g/100mL (PREMIX)  Every 8 Hours         03/02/22 1530    03/02/22 1630  metoclopramide (REGLAN) injection 10 mg  Every 6 Hours         03/02/22 1530    03/02/22 1614  POC Glucose Once  PROCEDURE ONCE         03/02/22 1543    03/02/22 1600  Vital Signs Every Hour Until 24 Hours Post Op  Every Hour      Comments: Perform Vitals Less Frequently Only if Patient Stable      03/02/22 1530    03/02/22 1600  Check Peripheral Pulses Every 1 Hour x4  Every Hour       03/02/22 1530    03/02/22 1600  Check Peripheral Pulses Every 4 Hours  Every 4 Hours       03/02/22 1530    03/02/22 1600  Intake & Output Every Hour Until 24 Hours Post Op  Every Hour       03/02/22 1530    03/02/22 1600  Cardiac Output Parameters On Admission, Then Every 1 Hour x4  Every Hour       03/02/22 1530    03/02/22 1600  Cardiac Output Parameters Every 2 Hours Until 24 Hours Post Op  Every 2 Hours       03/02/22 1530    03/02/22 1600  CBC (No Diff)  Every 4 Hours       03/02/22 1530    03/02/22 1550  Blood Gas, Arterial -  PROCEDURE ONCE         03/02/22 1547    03/02/22 1540  POC OR Panel, ISTAT  PROCEDURE ONCE         03/02/22 1115    03/02/22 1540  POC Activated Clotting Time  PROCEDURE ONCE          "03/02/22 1259    03/02/22 1540  POC Activated Clotting Time  PROCEDURE ONCE         03/02/22 1331    03/02/22 1540  POC Activated Clotting Time  PROCEDURE ONCE         03/02/22 1358    03/02/22 1540  POC OR Panel, ISTAT  PROCEDURE ONCE         03/02/22 1441    03/02/22 1539  POC OR Panel, ISTAT  PROCEDURE ONCE         03/02/22 1307    03/02/22 1539  POC OR Panel, ISTAT  PROCEDURE ONCE         03/02/22 1332    03/02/22 1539  POC OR Panel, ISTAT  PROCEDURE ONCE         03/02/22 1359    03/02/22 1539  POC Activated Clotting Time  PROCEDURE ONCE         03/02/22 1440    03/02/22 1538  POC Activated Clotting Time  PROCEDURE ONCE         03/02/22 1114    03/02/22 1538  POC Activated Clotting Time  PROCEDURE ONCE         03/02/22 1237    03/02/22 1538  POC OR Panel, ISTAT  PROCEDURE ONCE         03/02/22 1300    03/02/22 1531  Transfer Patient  Once         03/02/22 1530    03/02/22 1531  Vital Signs & Post-Op Checks Every 15 Minutes x2, Every 30 Minutes x4  Per Hospital Policy        Comments: Perform Vitals Less Frequently Only if Patient Stable    03/02/22 1530    03/02/22 1531  Daily Weights  Daily       03/02/22 1530    03/02/22 1531  Intake & Output Every 15 Minutes x2, Every 30 Minutes x4  Every Shift         03/02/22 1530    03/02/22 1531  Cardiac Monitoring  Continuous         03/02/22 1530    03/02/22 1531  Continuous Pulse Oximetry  Continuous         03/02/22 1530    03/02/22 1531  Discontinue NG After Extubation  Once         03/02/22 1530    03/02/22 1531  Continue Indwelling Urinary Catheter  Once        \"And\" Linked Group Details    03/02/22 1530    03/02/22 1531  Assess Need for Indwelling Urinary Catheter - Follow Removal Protocol  Continuous        Comments: Indwelling Urinary Catheter Removal Criteria  Discontinue Indwelling Urinary Catheter Unless One of the Following is Present  Urinary Retention or Obstruction  Chronic Nicole Catheter Use  End of Life  Critical Illness with Strict I/O   Tract or " "Abdominal Surgery  Stage 3/4 Sacral / Perineal Wound  Required Activity Restriction: Trauma  Required Activity Restriction: Spine Surgery  If Patient is Being Followed by Urology Contact Them PRIOR to Removal  Do Not Remove Indwelling Urinary Catheter Order is Present with a CLINICAL REASON to Maintain the Catheter. Provider is Required to Include a Clinical Reason to Maintain a Urinary Catheter    Chronic Nicole Catheter Use (Present on Admission)  Assess for Continued Need & Document Medical Necessity  If Infection is Suspected, Contact the Provider       \"And\" Linked Group Details    03/02/22 1530    03/02/22 1531  Chest & Mediastinal Tubes to 20cm Continuous Suction  Once         03/02/22 1530    03/02/22 1531  Begin Rewarming with Thermal Unit As Needed For Temp Less Than 96F  Once         03/02/22 1530    03/02/22 1531  ACE Wraps to Vein [place] [facility] for 24 Hours, Then Apply PÉREZ Hose  Continuous         03/02/22 1530    03/02/22 1531  Heart Hugger Vest  Continuous         03/02/22 1530    03/02/22 1531  Monitor QTc  Every Shift       03/02/22 1530    03/02/22 1531  Notify Provider - QTc 500 milliseconds or Greater  Until Discontinued         03/02/22 1530    03/02/22 1531  Keep Chest Incisions Covered Until Extubated  Continuous         03/02/22 1530    03/02/22 1531  If Chest Dressing Removed During Chest Tube Removal, Clean Incision With Normal Saline & Redress Until 48 Hours Post Op  Continuous         03/02/22 1530    03/02/22 1531  Notify Surgeon if Drainage From Surgical Incision Site  Until Discontinued         03/02/22 1530    03/02/22 1531  ISOLATE PACER WIRES  Continuous         03/02/22 1530    03/02/22 1531  Turn Patient Every 2 Hours or Begin Bed Rotation Once Hemodynamically Stable  Now Then Every 2 Hours         03/02/22 1530    03/02/22 1531  Advance PROM to AROM  Now Then Every 4 Hours         03/02/22 1530    03/02/22 1531  Up in Chair for Meals  Until Discontinued         03/02/22 1530 " "   03/02/22 1531  Notify Provider - Urine Output  Until Discontinued         03/02/22 1530    03/02/22 1531  Notify Provider - Chest Tube Output  Until Discontinued         03/02/22 1530    03/02/22 1531  Initial Ventilator Settings Per Pulmonologist / Anesthesiologist  Once         03/02/22 1530    03/02/22 1531  Incentive Spirometry  Every Hour While Awake       03/02/22 1530    03/02/22 1531  EZ-Pap  Once         03/02/22 1530    03/02/22 1531  Wean & Extubate Per Rapid Wean & Extubate Protocol  Start Now,   Status:  Canceled         03/02/22 1530    03/02/22 1531  XR Chest 1 View  1 Time Imaging         03/02/22 1530    03/02/22 1531  ECG 12 Lead  STAT         03/02/22 1530    03/02/22 1531  Protime-INR  STAT         03/02/22 1530    03/02/22 1531  aPTT  STAT         03/02/22 1530    03/02/22 1531  Calcium, Ionized  STAT         03/02/22 1530    03/02/22 1531  Blood Gas, Arterial -  STAT         03/02/22 1530    03/02/22 1531  CBC & Differential  STAT         03/02/22 1530    03/02/22 1531  Fibrinogen  STAT         03/02/22 1530    03/02/22 1531  Renal Function Panel  Now Then Every 4 Hours       03/02/22 1530    03/02/22 1531  Magnesium  Now Then Every 4 Hours       03/02/22 1530    03/02/22 1531  Potassium  Now Then Every 4 Hours       03/02/22 1530    03/02/22 1531  NPO Diet  Diet Effective Now         03/02/22 1530    03/02/22 1531  Advance Diet as Tolerated  Until Discontinued         03/02/22 1530    03/02/22 1531  Cardiac Rehab Evaluation and Enrollment  Once        Provider:  (Not yet assigned)    03/02/22 1530    03/02/22 1531  Consult to Case Management /   Once        Provider:  (Not yet assigned)    03/02/22 1530    03/02/22 1531  PT Consult: Eval & Treat  Once         03/02/22 1530    03/02/22 1531  CBC Auto Differential  PROCEDURE ONCE         03/02/22 1530    03/02/22 1530  Urinary Catheter Care  Every Shift      \"And\" Linked Group Details    03/02/22 1530    03/02/22 1530  " "acetaminophen (TYLENOL) tablet 650 mg  Every 4 Hours        \"Or\" Linked Group Details    03/02/22 1530    03/02/22 1530  acetaminophen (TYLENOL) 160 MG/5ML solution 650 mg  Every 4 Hours        \"Or\" Linked Group Details    03/02/22 1530    03/02/22 1530  acetaminophen (TYLENOL) suppository 650 mg  Every 4 Hours        \"Or\" Linked Group Details    03/02/22 1530    03/02/22 1530  morphine injection 1 mg  Every 4 Hours PRN        \"And\" Linked Group Details    03/02/22 1530    03/02/22 1530  naloxone (NARCAN) injection 0.4 mg  Every 5 Minutes PRN        \"And\" Linked Group Details    03/02/22 1530    03/02/22 1530  potassium chloride (K-DUR,KLOR-CON) ER tablet 40 mEq  As Needed        \"Or\" Linked Group Details    03/02/22 1530    03/02/22 1530  potassium chloride (KLOR-CON) packet 40 mEq  As Needed        \"Or\" Linked Group Details    03/02/22 1530    03/02/22 1530  potassium chloride 10 mEq in 100 mL IVPB  Every 1 Hour PRN        \"Or\" Linked Group Details    03/02/22 1530    03/02/22 1530  potassium chloride 10 mEq in 100 mL IVPB  Every 1 Hour PRN        \"Or\" Linked Group Details    03/02/22 1530    03/02/22 1530  pantoprazole (PROTONIX) injection 40 mg  Once        \"Followed by\" Linked Group Details    03/02/22 1530    03/02/22 1530  sodium chloride 0.9 % flush 30 mL  Once As Needed         03/02/22 1530    03/02/22 1530  sodium chloride 0.9 % infusion  Continuous PRN         03/02/22 1530    03/02/22 1530  propofol (DIPRIVAN) infusion 10 mg/mL 100 mL  Continuous PRN         03/02/22 1530    03/02/22 1530  potassium chloride 20 mEq in 50 mL IVPB  Every 1 Hour PRN         03/02/22 1530    03/02/22 1530  potassium chloride 20 mEq in 50 mL IVPB  Every 1 Hour PRN         03/02/22 1530    03/02/22 1530  potassium chloride 20 mEq in 50 mL IVPB  Every 1 Hour PRN         03/02/22 1530    03/02/22 1530  phenylephrine (ESME-SYNEPHRINE) 50 mg in 250 mL NS infusion  Continuous PRN         03/02/22 1530    03/02/22 1530  niCARdipine " (CARDENE) 25 mg in 250 mL NS infusion kit  Continuous PRN         03/02/22 1530    03/02/22 1530  norepinephrine (LEVOPHED) 8 mg in 250 mL NS infusion (premix)  Continuous PRN         03/02/22 1530    03/02/22 1530  milrinone (PRIMACOR) 20 mg in 100 mL D5W infusion  Continuous PRN         03/02/22 1530    03/02/22 1530  HYDROcodone-acetaminophen (NORCO) 5-325 MG per tablet 2 tablet  Every 4 Hours PRN         03/02/22 1530    03/02/22 1530  oxyCODONE (ROXICODONE) immediate release tablet 10 mg  Every 4 Hours PRN         03/02/22 1530    03/02/22 1530  morphine injection 4 mg  Every 30 Minutes PRN         03/02/22 1530    03/02/22 1530  insulin regular 1 unit/mL in 0.9% sodium chloride  Continuous PRN         03/02/22 1530    03/02/22 1530  meperidine (DEMEROL) injection 25 mg  Every 4 Hours PRN         03/02/22 1530    03/02/22 1530  polyethylene glycol (MIRALAX) packet 17 g  Daily PRN         03/02/22 1530    03/02/22 1530  ondansetron (ZOFRAN) injection 4 mg  Every 6 Hours PRN         03/02/22 1530    03/02/22 1530  midazolam (VERSED) injection 2 mg  Every 1 Hour PRN         03/02/22 1530    03/02/22 1530  DOPamine 400 mg in 250 mL D5W infusion  Continuous PRN         03/02/22 1530    03/02/22 1530  clevidipine (CLEVIPREX) infusion 0.5 mg/mL  Continuous PRN         03/02/22 1530    03/02/22 1530  EPINEPHrine 5 mg in 250 mL NS infusion  Continuous PRN         03/02/22 1530    03/02/22 1530  bisacodyl (DULCOLAX) EC tablet 10 mg  Daily PRN         03/02/22 1530    03/02/22 1530  albumin human 5 % solution 1,500 mL  As Needed         03/02/22 1530    03/02/22 1530  ALPRAZolam (XANAX) tablet 0.25 mg  Every 8 Hours PRN         03/02/22 1530    03/02/22 1456  Transfuse RBC  Status:  Canceled       03/02/22 1456    03/02/22 1137  gelatin absorbable (GELFOAM) sponge  As Needed,   Status:  Discontinued         03/02/22 1157    03/02/22 1137  heparin (porcine) 5000 UNIT/ML injection  As Needed,   Status:  Discontinued          03/02/22 1157    03/02/22 1137  papaverine injection  As Needed,   Status:  Discontinued         03/02/22 1157    03/02/22 1045  Cardiac Monitoring  Continuous,   Status:  Canceled         03/02/22 1044    03/02/22 1045  May Be Off Telemetry for Tests  Continuous,   Status:  Canceled         03/02/22 1044    03/02/22 1044  Inpatient Admission  Once         03/02/22 1044    03/02/22 1038  OR Blood Pickup  Once,   Status:  Canceled         03/02/22 1037    03/02/22 1033  methadone (DOLOPHINE) injection 10 mg  Once As Needed         03/02/22 1033    03/02/22 1025  XR Chest 1 View  1 Time Imaging,   Status:  Canceled         03/02/22 1025    03/02/22 1004  POC Glucose Once  PROCEDURE ONCE         03/02/22 1002    03/02/22 0958  midazolam (VERSED) injection  Code / Trauma / Sedation Medication         03/02/22 0958    03/02/22 0958  fentaNYL citrate (PF) (SUBLIMAZE) injection  Code / Trauma / Sedation Medication         03/02/22 0958    03/02/22 0947  dextrose (D50W) (25 g/50 mL) IV injection 50 mL  Every 1 Hour PRN,   Status:  Discontinued         03/02/22 0947    03/02/22 0944  POC Glucose Once  PROCEDURE ONCE         03/02/22 0940    03/02/22 0944  POC Glucose Once  PROCEDURE ONCE         03/02/22 0942    03/02/22 0929  sodium chloride 0.9 % flush 3 mL  Every 12 Hours Scheduled,   Status:  Discontinued         03/02/22 0927    03/02/22 0929  lactated ringers infusion  Continuous,   Status:  Discontinued         03/02/22 0927    03/02/22 0929  famotidine (PEPCID) injection 20 mg  Once         03/02/22 0927    03/02/22 0929  chlorhexidine (PERIDEX) 0.12 % solution 15 mL  Once,   Status:  Discontinued         03/02/22 0927    03/02/22 0928  Notify Anesthesiologist with any acute changes in patient's condition.  Continuous,   Status:  Canceled         03/02/22 0927    03/02/22 0928  Notify Anesthesiologist for unrelieved pain.  Continuous,   Status:  Canceled        Comments: Notify Anesthesiologist    03/02/22 3280     03/02/22 0928  Insert Peripheral IV  Once,   Status:  Canceled         03/02/22 0927 03/02/22 0928  Saline Lock & Maintain IV Access  Continuous,   Status:  Canceled         03/02/22 0927 03/02/22 0928  May take Beta Blocker from home with sip of water.  Once        Comments: Only applicable to patients on home Beta Blocker    03/02/22 0927 03/02/22 0928  POC Glucose Once  Once        Comments: For all diabetic patients. Notify Anesthesiologist for blood sugar greater than 180 or less than 60..      03/02/22 0927 03/02/22 0928  Clip Hair Chin to Ankles  Once         03/02/22 0927 03/02/22 0928  Follow Anesthesia Guidelines / Standing Orders  Once         03/02/22 0927 03/02/22 0928  Verify NPO Status  Continuous,   Status:  Canceled         03/02/22 0927 03/02/22 0927  Vital Signs - Per Anesthesia Protocol  As Needed,   Status:  Canceled       03/02/22 0927 03/02/22 0927  Oxygen Therapy- Nasal Cannula; Titrate for SPO2: Per Policy  Continuous PRN,   Status:  Canceled       03/02/22 0927 03/02/22 0927  Pulse Oximetry, Continuous  Continuous PRN,   Status:  Canceled       03/02/22 0927 03/02/22 0927  POC Glucose PRN  As Needed,   Status:  Canceled      Comments: For all diabetic patients. Notify Anesthesiologist for blood sugar > 180.      03/02/22 0927 03/02/22 0927  sodium chloride 0.9 % flush 3-10 mL  As Needed,   Status:  Discontinued         03/02/22 0927 03/02/22 0927  lidocaine PF 1% (XYLOCAINE) injection 0.5 mL  Once As Needed,   Status:  Discontinued         03/02/22 0927 03/02/22 0927  fentaNYL citrate (PF) (SUBLIMAZE) injection 50 mcg  Every 10 Minutes PRN,   Status:  Discontinued         03/02/22 0927 03/02/22 0927  midazolam (VERSED) injection 1 mg  Every 10 Minutes PRN,   Status:  Discontinued         03/02/22 0927 03/02/22 0927  Chlorhexidine Gluconate Cloth 2 % pads 1 application  Every 12 Hours PRN,   Status:  Discontinued         03/02/22 0927 03/02/22  0927  mupirocin (BACTROBAN) 2 % nasal ointment 1 application  Every 12 Hours,   Status:  Discontinued         03/02/22 0927    Unscheduled  Check Peripheral Pulses As Needed  As Needed       03/02/22 1530    Unscheduled  Cardiac Output Parameters PRN Until 24 Hours Post-Op  As Needed       03/02/22 1530    Unscheduled  Pacemaker Settings - Initiate for Heart Rate Less Than 60 And / Or Hemodynamically Unstable  As Needed      Comments: Mode: AAI  Atrial rate: 90  Atrial mA: 10  Atrial mV: 0.5    03/02/22 1530    Unscheduled  Insert Nasogastric Tube If Indicated & Not Already in Place  As Needed      Comments: Indications: Nausea, Vomiting, Prolonged Intubation or to Administer Medications  Attach to Low Wall Suction if Any Residual    03/02/22 1530    Unscheduled  Cleanse Incision With Normal Saline and Redress With Dry 4x4 Gauze if Incision is Draining  As Needed       03/02/22 1530    Unscheduled  Change Chest Tube Dressings PRN to Keep Dry - Do NOT Reinforce  As Needed       03/02/22 1530    Unscheduled  Dangle at Bedside After Extubation  As Needed       03/02/22 1530    Unscheduled  Up in Chair As Tolerated After Extubation  As Needed       03/02/22 1530    Unscheduled  Oxygen Therapy- Nasal Cannula; 2 LPM; Titrate for SPO2: 92%  Continuous PRN       03/02/22 1530    Unscheduled  Patient May Use Home CPAP / BIPAP As Needed  As Needed       03/02/22 1530    Unscheduled  Blood Gas, Arterial -  As Needed      Comments: 30 Minutes After Ventilator Changes, 30 Minutes After Extubation and PRN      03/02/22 1530    Unscheduled  CBC & Differential  As Needed        Comments: Chest Tube Drainage Greater Than 200mL/hr      03/02/22 1530    Unscheduled  aPTT  As Needed        Comments: Chest Tube Drainage Greater Than 200mL/hr      03/02/22 1530    Unscheduled  Protime-INR  As Needed        Comments: Chest Tube Drainage Greater Than 200mL/hr      03/02/22 1530    Unscheduled  Fibrinogen  As Needed        Comments: Chest  Tube Drainage Greater Than 200mL/hr      03/02/22 1530    Unscheduled  Potassium  As Needed      Comments: Four hours after dose given.      03/02/22 1530    Unscheduled  Magnesium  As Needed        Comments: If potassium stays low after replacement      03/02/22 1530    Signed and Held  Insert Indwelling Urinary Catheter  Once         Signed and Held    Signed and Held  Assess Need for Indwelling Urinary Catheter - Follow Removal Protocol  Continuous        Comments: Indwelling Urinary Catheter Removal Criteria  Discontinue Indwelling Urinary Catheter Unless One of the Following is Present:  Urinary Retention or Obstruction  Chronic Urinary Catheter Use  End of Life  Critical Illness with Strict I/O   Tract or Abdominal Surgery  Stage 3/4 Sacral / Perineal Wound  Required Activity Restriction: Trauma  Required Activity Restriction: Spine Surgery  If Patient is Being Followed by Urology Contact Them PRIOR to Removal  Do Not Remove Indwelling Urinary Catheter Order is Present with a CLINICAL REASON to Maintain the Catheter. Provider is Required to Include a Clinical Reason to Maintain a Urinary Catheter    Patient Admitted With Indwelling Urinary Catheter (Not Placed at [facility])  Assess for Continued Need & Document Medical Necessity  If Infection is Suspected, Contact the Provider        Signed and Held    Signed and Held  Urinary Catheter Care  Every Shift       Signed and Held    Signed and Held  Do NOT Hold Basal or Correction Scale Insulin When Patient is NPO, Hold Scheduled Mealtime (Bolus) Insulin if NPO  Continuous         Signed and Held    Signed and Held  Follow [facility] Hypoglycemia Standing Orders For Blood Glucose Less Than 70 mg/dL  Until Discontinued        Comments: ALERT PATIENT - NOT NPO & CAN SAFELY SWALLOW  Administer 4 oz Fruit Juice OR 4 oz Regular Soda OR 8 oz Milk OR 15-30 grams (1 tube) of Glucose Gel.  Recheck Blood Glucose Approximately 15 Minutes After Ingestion, Repeat Treatment &  Continue to Recheck Blood Sugar Approximately Every 15 Minutes Until Blood Glucose is 70 or Higher.  Once Blood Glucose is 70 or Higher & if It Will Be More Than 60 Minutes Until Next Meal, Provide Appropriate Snack (Including Carbohydrate Food) Based on Meal Plan Order. Give Meal Tray As Soon As Possible.    PATIENT HAS IV ACCESS - UNRESPONSIVE, NPO OR UNABLE TO SAFELY SWALLOW  Administer 25g (50ml) D50W IV Push.  Recheck Blood Glucose Approximately 15 Minutes After Administration, if Blood Glucose Remains Less Than 70, Repeat Treatment   Recheck Blood Glucose Approximately 15 Minutes After 2nd Administration, if Blood Glucose Remains Less Than 70 After 2nd Dose of D50W, Contact Provider for Further Treatment Orders & Consider Adding IVF With D5W for Maintenance    PATIENT WITHOUT IV ACCESS - UNRESPONSIVE, NPO OR UNABLE TO SAFELY SWALLOW  Administer 1mg Glucagon SQ & Establish IV Access.  Turn Patient on Side - Nausea / Vomiting May Occur.  Recheck Blood Glucose Approximately 15 Minutes After Administration.  If Blood Glucose Remains Less Than 70, Administer 25g D50W IV Push (50ml).  Recheck Blood Glucose Approximately 15 Minutes After Administration of D50W, if Blood Glucose Remains Less Than 70, Contact Provider for Further Treatment Orders & Consider Adding IVF With D5 for Maintenance    Document Event & Patient Response to Interventions in EMR, Document Medications on MAR  Notify Provider if Hypoglycemia Treatment Needed    Signed and Held    Signed and Held  dextrose (GLUTOSE) oral gel 15 g  Every 15 Minutes PRN         Signed and Held    Signed and Held  dextrose (D50W) (25 g/50 mL) IV injection 25 g  Every 15 Minutes PRN         Signed and Held    Signed and Held  glucagon (human recombinant) (GLUCAGEN DIAGNOSTIC) injection 1 mg  Every 15 Minutes PRN         Signed and Held    Signed and Held  POC Glucose TID AC  3 Times Daily Before Meals       Signed and Held    Signed and Held  insulin lispro (ADMELOG)  injection 0-9 Units  4 Times Daily With Meals & Nightly         Signed and Held                   Operative/Procedure Notes (last 24 hours)      Jr Eliseo Hewitt MD at 03/02/22 1137                                           [facility] CARDIAC SURGERY OP NOTE    Preop Diagnosis: Severe coronary artery disease. Recent SARS-CoV-2 infection with malnutrition improved. Ischemic cardiomyopathy ejection fraction of 30%. Nonhealing ulcer on the right foot. Anemia. Lazy right eye. Cerebral palsy.    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 We thought he was too sick to have surgery. CABG was advisable. He gained weight and was exercising and now ready for surgery. Operation was advisable to prolong life and relieve symptoms.The STS Risk and all risks and alternatives were discussed with the patient and family.  Counseling was done regarding abuse of tobacco, alcohol and drugs as needed. They understand and wish to proceed.    Procedure: CABG x7. Skeletonized LIMA to mid and then distal LAD. Vein graft to RCA and then PDA. Vein graft to D1, OM1, and OM 2, temporary cardiopulmonary bypass. Antegrade and retrograde cold blood cardioplegia with warm reperfusion. Neurologic monitoring. Transesophageal echo. Endoscopic vein harvest left greater saphenous vein. Left posterior lateral pericardial window.    Surgeon: Eliseo Hewitt MD    Assistant: Assistant: Kacie Rodrigues PA was responsible for performing the following activities: Cardiac Surgery First assist, Endoscopic Vein [place] if needed for CABG,  surgical wound closure and their skilled assistance was necessary for the success of this case.     Anesthesia: GET    Findings : There is no height or weight on file to calculate BMI. The heart was enlarged grade 4 with diffuse hypokinesis. The arteries tended to have posterior disease. The RCA was 2.5  mm the PDA was 1.5 mm. OM1 was 2 mm and intramyocardial. OM 2 was 1.5 mm. The diagonal branch was 1.5 with posterior disease. The LAD was 2 mm at the midportion and 2 mm distally. The MAURA was 2.5 mm with excellent blood flow. The vein was 4.5 mm and beautiful.    Operative Procedure: A primary median sternotomy was made while the left greater saphenous vein was harvested with the endoscope. The internal mammary artery was dissected off the left chest wall with a harmonic scalpel and was skeletonized. Cardiopulmonary bypass was established for 88 minutes 15 to 34 °C and appropriate flow rates. The aorta was crossclamped and 72 minutes we gave a liter of antegrade cold blood cardioplegia and then 2 L of retrograde cold blood cardioplegia and repeated doses every 10 to 15 minutes to good effect. 4 veins were anastomosed the ascending aorta with 6-0 Prolene and marked with washers. The first vein was sewn side to side to the RCA and then to the PDA with 7-0 Prolene. The next 3 veins were sewn to the diagonal branch, OM1 and OM 2 with 7-0 Prolene. The LIMA was sewn side to side and the mid LAD and then to the distal LAD with 7-0 Prolene. A warm dose of retrograde cardioplegia was given and then with strong suction on the aortic needle vent the cross-clamp was released. The patient was rewarmed and cardiopulmonary bypass was weaned and discontinued. Decannulation was effected and usual devices were placed. Hemostasis was obtained. 2 chest tubes were inserted and we applied vancomycin enriched plate rich plasma. The sternum was closed with stainless steel wires. The fascia, soft tissues and skin were closed usual. The sponge, needle and instrument counts noted to be correct. All the grafts are nicely and all the anastomoses were hemostatic.    Complications: Anemia. 2 units of packed red cells given in the operating room washed.    Tubes: 2    Epicardial Wires: 3    Blood Loss: Minimal    Bypass Time: 88 min    Aortic  cross-clamp time: 72 min    Specimen: None    Condition: Improved       Patient Care Team:  Farhad Mark MD as PCP - General (Internal Medicine)        Eliseo Hewitt MD  3/2/2022  15:17 EST              Electronically signed by Jr Eliseo Hewitt MD at 03/02/22 1524          Physician Progress Notes (last 24 hours)      Tana Harris APRN at 03/03/22 0755           LOS: 1 day   Patient Care Team:  Farhad Mark MD as PCP - General (Internal Medicine)    Chief Complaint: post op    Subjective      Vital Signs  Temp:  [96.08 °F (35.6 °C)-97.7 °F (36.5 °C)] 96.8 °F (36 °C)  Heart Rate:  [64-90] 89  Resp:  [10-18] 18  BP: ()/(38-81) 99/60  Arterial Line BP: ()/(37-59) 104/48  FiO2 (%):  [40 %-41 %] 40 %  Body mass index is 21.13 kg/m².    Intake/Output Summary (Last 24 hours) at 3/3/2022 0755  Last data filed at 3/3/2022 0700  Gross per 24 hour   Intake 2159 ml   Output 4385 ml   Net -2226 ml     No intake/output data recorded.    Chest tube drainage last 8 hours         03/03/22  0600   Weight: 66.8 kg (147 lb 4.3 oz)         Objective    Results Review:        WBC WBC   Date Value Ref Range Status   03/03/2022 12.69 (H) 3.40 - 10.80 10*3/mm3 Final   03/02/2022 15.43 (H) 3.40 - 10.80 10*3/mm3 Final   03/02/2022 15.61 (H) 3.40 - 10.80 10*3/mm3 Final   02/28/2022 9.38 3.40 - 10.80 10*3/mm3 Final      HGB Hemoglobin   Date Value Ref Range Status   03/03/2022 8.7 (L) 13.0 - 17.7 g/dL Final   03/02/2022 10.2 (L) 13.0 - 17.7 g/dL Final   03/02/2022 10.8 (L) 13.0 - 17.7 g/dL Final   03/02/2022 6.8 (L) 12.0 - 17.0 g/dL Final   03/02/2022 6.8 (L) 12.0 - 17.0 g/dL Final   03/02/2022 6.8 (L) 12.0 - 17.0 g/dL Final   03/02/2022 6.8 (L) 12.0 - 17.0 g/dL Final   03/02/2022 6.8 (L) 12.0 - 17.0 g/dL Final   03/02/2022 8.2 (L) 12.0 - 17.0 g/dL Final   02/28/2022 10.4 (L) 13.0 - 17.7 g/dL Final      HCT Hematocrit   Date Value Ref Range Status   03/03/2022 25.9 (L) 37.5 - 51.0 % Final   03/02/2022 30.9 (L) 37.5  - 51.0 % Final   03/02/2022 31.2 (L) 37.5 - 51.0 % Final   03/02/2022 20 (L) 38 - 51 % Final   03/02/2022 20 (L) 38 - 51 % Final   03/02/2022 20 (L) 38 - 51 % Final   03/02/2022 20 (L) 38 - 51 % Final   03/02/2022 20 (L) 38 - 51 % Final   03/02/2022 24 (L) 38 - 51 % Final   02/28/2022 30.7 (L) 37.5 - 51.0 % Final      Platelets Platelets   Date Value Ref Range Status   03/03/2022 175 140 - 450 10*3/mm3 Final   03/02/2022 225 140 - 450 10*3/mm3 Final   03/02/2022 208 140 - 450 10*3/mm3 Final   02/28/2022 371 140 - 450 10*3/mm3 Final        PT/INR:    Protime   Date Value Ref Range Status   03/03/2022 15.0 (H) 11.7 - 14.2 Seconds Final   03/02/2022 15.6 (H) 11.7 - 14.2 Seconds Final   02/28/2022 12.8 11.7 - 14.2 Seconds Final   /  INR   Date Value Ref Range Status   03/03/2022 1.18 (H) 0.90 - 1.10 Final   03/02/2022 1.24 (H) 0.90 - 1.10 Final   02/28/2022 0.97 0.90 - 1.10 Final       Sodium Sodium   Date Value Ref Range Status   03/03/2022 146 (H) 136 - 145 mmol/L Final   03/02/2022 148 (H) 136 - 145 mmol/L Final   03/02/2022 140 136 - 145 mmol/L Final   02/28/2022 136 136 - 145 mmol/L Final      Potassium Potassium   Date Value Ref Range Status   03/03/2022 4.1 3.5 - 5.2 mmol/L Final   03/02/2022 4.3 3.5 - 5.2 mmol/L Final   03/02/2022 4.9 3.5 - 5.2 mmol/L Final   02/28/2022 4.6 3.5 - 5.2 mmol/L Final      Chloride Chloride   Date Value Ref Range Status   03/03/2022 111 (H) 98 - 107 mmol/L Final   03/02/2022 111 (H) 98 - 107 mmol/L Final   03/02/2022 108 (H) 98 - 107 mmol/L Final   02/28/2022 102 98 - 107 mmol/L Final      Bicarbonate CO2   Date Value Ref Range Status   03/03/2022 22.0 22.0 - 29.0 mmol/L Final   03/02/2022 25.0 22.0 - 29.0 mmol/L Final   03/02/2022 19.0 (L) 22.0 - 29.0 mmol/L Final   02/28/2022 23.9 22.0 - 29.0 mmol/L Final      BUN BUN   Date Value Ref Range Status   03/03/2022 26 (H) 6 - 20 mg/dL Final   03/02/2022 26 (H) 6 - 20 mg/dL Final   03/02/2022 26 (H) 6 - 20 mg/dL Final   02/28/2022 20 6 -  20 mg/dL Final      Creatinine Creatinine   Date Value Ref Range Status   03/03/2022 1.14 0.76 - 1.27 mg/dL Final   03/02/2022 1.19 0.76 - 1.27 mg/dL Final   03/02/2022 1.14 0.76 - 1.27 mg/dL Final   02/28/2022 0.99 0.76 - 1.27 mg/dL Final      Calcium Calcium   Date Value Ref Range Status   03/03/2022 8.5 (L) 8.6 - 10.5 mg/dL Final   03/02/2022 9.3 8.6 - 10.5 mg/dL Final   03/02/2022 9.7 8.6 - 10.5 mg/dL Final   02/28/2022 9.0 8.6 - 10.5 mg/dL Final      Magnesium Magnesium   Date Value Ref Range Status   03/03/2022 2.8 (H) 1.6 - 2.6 mg/dL Final   03/02/2022 3.1 (H) 1.6 - 2.6 mg/dL Final   03/02/2022 3.5 (H) 1.6 - 2.6 mg/dL Final   02/28/2022 2.3 1.6 - 2.6 mg/dL Final          acetaminophen, 650 mg, Oral, Q4H   Or  acetaminophen, 650 mg, Oral, Q4H   Or  acetaminophen, 650 mg, Rectal, Q4H  aspirin, 81 mg, Oral, Daily  atorvastatin, 40 mg, Oral, Nightly  ceFAZolin, 2 g, Intravenous, Q8H  chlorhexidine, 15 mL, Mouth/Throat, Q12H  enoxaparin, 40 mg, Subcutaneous, Daily  magnesium sulfate, 1 g, Intravenous, Q8H  metoclopramide, 10 mg, Intravenous, Q6H  metoprolol tartrate, 12.5 mg, Oral, Q12H  mupirocin, , Each Nare, BID  pantoprazole, 40 mg, Oral, QAM  senna-docusate sodium, 2 tablet, Oral, Nightly      clevidipine, 2-32 mg/hr  dexmedetomidine, 0.2-1.5 mcg/kg/hr, Last Rate: Stopped (03/02/22 1730)  DOPamine, 2-20 mcg/kg/min  EPINEPHrine, 0.02-0.2 mcg/kg/min  insulin, 0-50 Units/hr, Last Rate: Stopped (03/03/22 0645)  milrinone, 0.25-0.375 mcg/kg/min  niCARdipine, 5-15 mg/hr, Last Rate: 3 mg/hr (03/02/22 1652)  nitroglycerin, 5-200 mcg/min  norepinephrine, 0.02-0.3 mcg/kg/min  phenylephrine, 0.2-2 mcg/kg/min  propofol, 5-50 mcg/kg/min, Last Rate: Stopped (03/02/22 1716)  sodium chloride, 30 mL/hr, Last Rate: 15 mL/hr (03/02/22 1632)  sodium chloride, 30 mL/hr            Patient Active Problem List   Diagnosis Code   • Coronary artery disease involving native coronary artery of native heart without angina pectoris I25.10    • Ischemic cardiomyopathy I25.5   • Chronic stable angina ([facility]) I20.8   • Type 2 diabetes mellitus with diabetic peripheral angiopathy without gangrene, with long-term current use of insulin ([facility]) E11.51, Z79.4   • Cerebral palsy ([facility]) G80.9   • Coronary artery disease of native heart with stable angina pectoris ([facility]) I25.118       Assessment & Plan    -Severe multivessel CAD-- s/p CABGx7 LIMA/LSVG- POD#1 [place]  -Diabetes   -CVA with peripheral vision disturbance   -cerebral palsy with left sided weakness   -non-healing right foot ulceration-- vascular consulted   -post op anemia- expected acute blood loss     Looks good this morning-- up in the chair   4L NC-- wean as able  Blood pressure a little borderline-- will replete calcium and give a little more albumin   Mobilize  Encourage pulmonary toilet        TAY Ardon  03/03/22  07:55 EST      Electronically signed by Tana Harris APRN at 03/03/22 0955

## 2022-03-03 NOTE — PROGRESS NOTES
LOS: 1 day   Patient Care Team:  Farhad Mark MD as PCP - General (Internal Medicine)    Chief Complaint: post op    Subjective      Vital Signs  Temp:  [96.08 °F (35.6 °C)-97.7 °F (36.5 °C)] 96.8 °F (36 °C)  Heart Rate:  [64-90] 89  Resp:  [10-18] 18  BP: ()/(38-81) 99/60  Arterial Line BP: ()/(37-59) 104/48  FiO2 (%):  [40 %-41 %] 40 %  Body mass index is 21.13 kg/m².    Intake/Output Summary (Last 24 hours) at 3/3/2022 0755  Last data filed at 3/3/2022 0700  Gross per 24 hour   Intake 2159 ml   Output 4385 ml   Net -2226 ml     No intake/output data recorded.    Chest tube drainage last 8 hours         03/03/22  0600   Weight: 66.8 kg (147 lb 4.3 oz)         Objective    Results Review:        WBC WBC   Date Value Ref Range Status   03/03/2022 12.69 (H) 3.40 - 10.80 10*3/mm3 Final   03/02/2022 15.43 (H) 3.40 - 10.80 10*3/mm3 Final   03/02/2022 15.61 (H) 3.40 - 10.80 10*3/mm3 Final   02/28/2022 9.38 3.40 - 10.80 10*3/mm3 Final      HGB Hemoglobin   Date Value Ref Range Status   03/03/2022 8.7 (L) 13.0 - 17.7 g/dL Final   03/02/2022 10.2 (L) 13.0 - 17.7 g/dL Final   03/02/2022 10.8 (L) 13.0 - 17.7 g/dL Final   03/02/2022 6.8 (L) 12.0 - 17.0 g/dL Final   03/02/2022 6.8 (L) 12.0 - 17.0 g/dL Final   03/02/2022 6.8 (L) 12.0 - 17.0 g/dL Final   03/02/2022 6.8 (L) 12.0 - 17.0 g/dL Final   03/02/2022 6.8 (L) 12.0 - 17.0 g/dL Final   03/02/2022 8.2 (L) 12.0 - 17.0 g/dL Final   02/28/2022 10.4 (L) 13.0 - 17.7 g/dL Final      HCT Hematocrit   Date Value Ref Range Status   03/03/2022 25.9 (L) 37.5 - 51.0 % Final   03/02/2022 30.9 (L) 37.5 - 51.0 % Final   03/02/2022 31.2 (L) 37.5 - 51.0 % Final   03/02/2022 20 (L) 38 - 51 % Final   03/02/2022 20 (L) 38 - 51 % Final   03/02/2022 20 (L) 38 - 51 % Final   03/02/2022 20 (L) 38 - 51 % Final   03/02/2022 20 (L) 38 - 51 % Final   03/02/2022 24 (L) 38 - 51 % Final   02/28/2022 30.7 (L) 37.5 - 51.0 % Final      Platelets Platelets   Date Value Ref Range Status    03/03/2022 175 140 - 450 10*3/mm3 Final   03/02/2022 225 140 - 450 10*3/mm3 Final   03/02/2022 208 140 - 450 10*3/mm3 Final   02/28/2022 371 140 - 450 10*3/mm3 Final        PT/INR:    Protime   Date Value Ref Range Status   03/03/2022 15.0 (H) 11.7 - 14.2 Seconds Final   03/02/2022 15.6 (H) 11.7 - 14.2 Seconds Final   02/28/2022 12.8 11.7 - 14.2 Seconds Final   /  INR   Date Value Ref Range Status   03/03/2022 1.18 (H) 0.90 - 1.10 Final   03/02/2022 1.24 (H) 0.90 - 1.10 Final   02/28/2022 0.97 0.90 - 1.10 Final       Sodium Sodium   Date Value Ref Range Status   03/03/2022 146 (H) 136 - 145 mmol/L Final   03/02/2022 148 (H) 136 - 145 mmol/L Final   03/02/2022 140 136 - 145 mmol/L Final   02/28/2022 136 136 - 145 mmol/L Final      Potassium Potassium   Date Value Ref Range Status   03/03/2022 4.1 3.5 - 5.2 mmol/L Final   03/02/2022 4.3 3.5 - 5.2 mmol/L Final   03/02/2022 4.9 3.5 - 5.2 mmol/L Final   02/28/2022 4.6 3.5 - 5.2 mmol/L Final      Chloride Chloride   Date Value Ref Range Status   03/03/2022 111 (H) 98 - 107 mmol/L Final   03/02/2022 111 (H) 98 - 107 mmol/L Final   03/02/2022 108 (H) 98 - 107 mmol/L Final   02/28/2022 102 98 - 107 mmol/L Final      Bicarbonate CO2   Date Value Ref Range Status   03/03/2022 22.0 22.0 - 29.0 mmol/L Final   03/02/2022 25.0 22.0 - 29.0 mmol/L Final   03/02/2022 19.0 (L) 22.0 - 29.0 mmol/L Final   02/28/2022 23.9 22.0 - 29.0 mmol/L Final      BUN BUN   Date Value Ref Range Status   03/03/2022 26 (H) 6 - 20 mg/dL Final   03/02/2022 26 (H) 6 - 20 mg/dL Final   03/02/2022 26 (H) 6 - 20 mg/dL Final   02/28/2022 20 6 - 20 mg/dL Final      Creatinine Creatinine   Date Value Ref Range Status   03/03/2022 1.14 0.76 - 1.27 mg/dL Final   03/02/2022 1.19 0.76 - 1.27 mg/dL Final   03/02/2022 1.14 0.76 - 1.27 mg/dL Final   02/28/2022 0.99 0.76 - 1.27 mg/dL Final      Calcium Calcium   Date Value Ref Range Status   03/03/2022 8.5 (L) 8.6 - 10.5 mg/dL Final   03/02/2022 9.3 8.6 - 10.5 mg/dL  Final   03/02/2022 9.7 8.6 - 10.5 mg/dL Final   02/28/2022 9.0 8.6 - 10.5 mg/dL Final      Magnesium Magnesium   Date Value Ref Range Status   03/03/2022 2.8 (H) 1.6 - 2.6 mg/dL Final   03/02/2022 3.1 (H) 1.6 - 2.6 mg/dL Final   03/02/2022 3.5 (H) 1.6 - 2.6 mg/dL Final   02/28/2022 2.3 1.6 - 2.6 mg/dL Final          acetaminophen, 650 mg, Oral, Q4H   Or  acetaminophen, 650 mg, Oral, Q4H   Or  acetaminophen, 650 mg, Rectal, Q4H  aspirin, 81 mg, Oral, Daily  atorvastatin, 40 mg, Oral, Nightly  ceFAZolin, 2 g, Intravenous, Q8H  chlorhexidine, 15 mL, Mouth/Throat, Q12H  enoxaparin, 40 mg, Subcutaneous, Daily  magnesium sulfate, 1 g, Intravenous, Q8H  metoclopramide, 10 mg, Intravenous, Q6H  metoprolol tartrate, 12.5 mg, Oral, Q12H  mupirocin, , Each Nare, BID  pantoprazole, 40 mg, Oral, QAM  senna-docusate sodium, 2 tablet, Oral, Nightly      clevidipine, 2-32 mg/hr  dexmedetomidine, 0.2-1.5 mcg/kg/hr, Last Rate: Stopped (03/02/22 1730)  DOPamine, 2-20 mcg/kg/min  EPINEPHrine, 0.02-0.2 mcg/kg/min  insulin, 0-50 Units/hr, Last Rate: Stopped (03/03/22 0645)  milrinone, 0.25-0.375 mcg/kg/min  niCARdipine, 5-15 mg/hr, Last Rate: 3 mg/hr (03/02/22 1652)  nitroglycerin, 5-200 mcg/min  norepinephrine, 0.02-0.3 mcg/kg/min  phenylephrine, 0.2-2 mcg/kg/min  propofol, 5-50 mcg/kg/min, Last Rate: Stopped (03/02/22 1716)  sodium chloride, 30 mL/hr, Last Rate: 15 mL/hr (03/02/22 1632)  sodium chloride, 30 mL/hr            Patient Active Problem List   Diagnosis Code   • Coronary artery disease involving native coronary artery of native heart without angina pectoris I25.10   • Ischemic cardiomyopathy I25.5   • Chronic stable angina ([facility]) I20.8   • Type 2 diabetes mellitus with diabetic peripheral angiopathy without gangrene, with long-term current use of insulin ([facility]) E11.51, Z79.4   • Cerebral palsy ([facility]) G80.9   • Coronary artery disease of native heart with stable angina pectoris ([facility]) I25.118       Assessment & Plan    -Severe  multivessel CAD-- s/p CABGx7 LIMA/LSVG- POD#1 [place]  -Diabetes   -CVA with peripheral vision disturbance   -cerebral palsy with left sided weakness   -non-healing right foot ulceration-- vascular consulted   -post op anemia- expected acute blood loss     Looks good this morning-- up in the chair   4L NC-- wean as able  Blood pressure a little borderline-- will replete calcium and give a little more albumin   Mobilize  Encourage pulmonary toilet        TAY Ardon  03/03/22  07:55 EST

## 2022-03-03 NOTE — PLAN OF CARE
Goal Outcome Evaluation:  Plan of Care Reviewed With: patient           Outcome Summary: Patient is 58 yo male admitted to [facility] for CABG x5. PMH significant for DM, CVA, CP with residual LLE weakness, HTN, recent COVID infection. Patient reports he uses a rwx sometimes, is staying with parents currently, has 3 steps to enter home and 3 steps inside home. Today, patient performed 5 reps per cardiac protocol, sit to stand with minAx2 and cues for sternal precautions, ambulated about 50 feet with minAx2. Pt has impairments consisting of generalized post op weakness and pain, decreased activity tolerance, decreased balance and would benefit from skilled PT. D/c plan is to return home with parents and HH.  Patient was intermittently wearing a face mask during this therapy encounter. Therapist used appropriate personal protective equipment including eye protection, mask, and gloves.  Mask used was standard procedure mask. Appropriate PPE was worn during the entire therapy session. Hand hygiene was completed before and after therapy session. Patient is not in enhanced droplet precautions.

## 2022-03-03 NOTE — CONSULTS
"Name: Pierce Morse ADMIT: 3/2/2022   : 1965  PCP: Farhad Mark MD    MRN: [id number] LOS: 1 days   AGE/SEX: 57 y.o. male  ROOM:      Inpatient Vascular Surgery Consult  Consult performed by: Ewelina Reza MD  Consult ordered by: Tana Harris APRN Ashlee Ann Vinyard, MD     LOS: 1 day   Patient Care Team:  Farhad Mark MD as PCP - General (Internal Medicine)    Subjective     History of Present Illness  57 y.o. male with a history of cerebral palsy, diabetes mellitus, chronic kidney disease, ischemic cardiomyopathy, stroke, and multivessel coronary artery disease status post 7 vessel CABG on 2022 by Dr. Hewitt with a right heel ulceration.  He states he has had the ulceration for several months, since a hospitalization in 2021 when he was \"in a coma.\"  It sounds like he may have been in DKA during this time.  He claims this is a pressure wound from being in bed for prolonged period of time during this illness.  He had no ulceration prior to that hospitalization.  Since that time, the ulceration has been improving without intervention.  He does have clear drainage from that area. No redness, no fevers or chills.  It only hurts when he is walking.  He denies claudication and rest pain symptoms. He has been seen at another facility by vascular surgery who told him he \"needed stents\" in his right leg.  He is in good spirits and feeling well post operatively.     Review of Systems   Constitutional: Negative.    HENT: Negative.    Eyes: Negative.    Respiratory: Negative.    Cardiovascular: Negative.    Gastrointestinal: Negative.    Endocrine: Negative.    Genitourinary: Negative.    Musculoskeletal: Negative.    Skin: Negative.    Allergic/Immunologic: Negative.    Hematological: Negative.    Psychiatric/Behavioral: Negative.        Past Medical History:   Diagnosis Date   • Alcohol abuse     SOBRIETY SINCE AUG 2021   • CAD (coronary artery disease)    • CP " "(cerebral palsy) ([facility])    • Depression    • Diabetes mellitus (HCC)    • DVT (deep venous thrombosis) ([facility])     PT STATES \"WAS RULED OUT\" ON RIGHT LEG   • Heel ulcer ([facility])     RIGHT.  STATES IN WOUND CARE WITH HOME HEALTH   • Hyperlipidemia    • Hypertension    • Ischemic cardiomyopathy    • PVD (peripheral vascular disease) ([facility])    • Renal insufficiency    • Stroke ([facility]) 2021       Past Surgical History:   Procedure Laterality Date   • CLUB FOOT RELEASE Left     X6   • CORONARY ARTERY BYPASS GRAFT N/A 3/2/2022    Procedure: CORONARY ARTERY BYPASS GRAFT X5, WITH LEFT INTERNAL MAMMARY HARVEST, MIDLINE STERNOTOMY,  INTRAOP MARIELA, PRP;  Surgeon: Jr Eliseo Hewitt MD;  Location: [facility];  Service: Cardiothoracic;  Laterality: N/A;   • ENDOSCOPY     • EYE MUSCLE SURGERY Right        Family History   Problem Relation Age of Onset   • Malig Hyperthermia Neg Hx        Social History     Tobacco Use   • Smoking status: Former Smoker     Quit date: 3/17/2021     Years since quittin.9   • Smokeless tobacco: Former User   Vaping Use   • Vaping Use: Never used   Substance Use Topics   • Alcohol use: Not Currently     Comment: LAST DRINK 2021   • Drug use: Never       Allergies: Patient has no known allergies.    Medications Prior to Admission   Medication Sig Dispense Refill Last Dose   • amLODIPine (NORVASC) 10 MG tablet Take 10 mg by mouth Every Night.   3/1/2022 at 2100   • amLODIPine (NORVASC) 5 MG tablet Take 5 mg by mouth Every Night.   3/1/2022 at 2100   • atorvastatin (LIPITOR) 40 MG tablet Take 40 mg by mouth Every Night.   3/1/2022 at 2100   • carvedilol (COREG) 6.25 MG tablet Take 6.25 mg by mouth 2 (Two) Times a Day With Meals.   3/1/2022 at 2100   • chlorhexidine (PERIDEX) 0.12 % solution Apply 15 mL to the mouth or throat. As directed pre op   3/2/2022 at 0630   • FLUoxetine (PROzac) 20 MG capsule Take 20 mg by mouth Every Night.   3/1/2022 at 2100   • insulin aspart (NovoLOG FlexPen) 100 " UNIT/ML solution pen-injector sc pen Inject 10 Units under the skin into the appropriate area as directed 3 (Three) Times a Day With Meals.   3/1/2022 at 1930   • insulin glargine (Lantus) 100 UNIT/ML injection Inject 20 Units under the skin into the appropriate area as directed 2 (Two) Times a Day.   3/1/2022 at 1930   • lisinopril (PRINIVIL,ZESTRIL) 40 MG tablet Take 20 mg by mouth Every Night.   3/1/2022 at 2100   • metFORMIN (GLUCOPHAGE) 500 MG tablet Take 500 mg by mouth 2 (Two) Times a Day With Meals.   3/1/2022 at 2100   • mupirocin (BACTROBAN) 2 % nasal ointment into the nostril(s) as directed by provider.   3/2/2022 at 0600   • aspirin (aspirin) 81 MG EC tablet Take 81 mg by mouth Daily. PT HOLDING FOR SURGERY   2/27/2022   • BUPIVACAINE 0.5% IN DEXTROSE 50% INJECTION - PODIATRY 12.5 g.      • sulfamethoxazole-trimethoprim (Bactrim DS) 800-160 MG per tablet Take 1 tablet by mouth 2 (Two) Times a Day for 3 days. 6 tablet 0 Unknown at Unknown time     aspirin, 81 mg, Oral, Daily  atorvastatin, 40 mg, Oral, Nightly  ceFAZolin, 2 g, Intravenous, Q8H  chlorhexidine, 15 mL, Mouth/Throat, Q12H  enoxaparin, 40 mg, Subcutaneous, Daily  guaiFENesin, 1,200 mg, Oral, Q12H  insulin glargine, 15 Units, Subcutaneous, Nightly  insulin lispro, 0-9 Units, Subcutaneous, 4x Daily With Meals & Nightly  metoprolol tartrate, 25 mg, Oral, Q12H  mupirocin, , Each Nare, BID  pantoprazole, 40 mg, Oral, QAM  senna-docusate sodium, 2 tablet, Oral, Nightly      sodium chloride, 30 mL/hr, Last Rate: 15 mL/hr (03/02/22 1632)  sodium chloride, 30 mL/hr      •  acetaminophen **OR** acetaminophen **OR** acetaminophen  •  ALPRAZolam  •  bisacodyl  •  bisacodyl  •  cyclobenzaprine  •  dextrose  •  dextrose  •  glucagon (human recombinant)  •  HYDROcodone-acetaminophen  •  magnesium hydroxide  •  Morphine **AND** naloxone  •  ondansetron  •  oxyCODONE  •  polyethylene glycol  •  potassium chloride **OR** potassium chloride  •  potassium  chloride **OR** potassium chloride  •  potassium chloride  •  potassium chloride  •  potassium chloride  •  sodium chloride  •  sodium chloride      Objective   Temp:  [96.08 °F (35.6 °C)-97.7 °F (36.5 °C)] 96.8 °F (36 °C)  Heart Rate:  [64-90] 71  Resp:  [10-18] 14  BP: ()/(58-72) 107/64  Arterial Line BP: ()/(37-59) 122/50  FiO2 (%):  [40 %-41 %] 40 %    I/O this shift:  In: 1090 [P.O.:690; I.V.:400]  Out: 580 [Urine:420; Chest Tube:160]    Physical Exam  Constitutional:       General: He is not in acute distress.  HENT:      Head: Normocephalic and atraumatic.      Right Ear: External ear normal.      Left Ear: External ear normal.      Nose: Nose normal.      Mouth/Throat:      Mouth: Mucous membranes are moist.      Pharynx: Oropharynx is clear.   Eyes:      Conjunctiva/sclera: Conjunctivae normal.      Pupils: Pupils are equal, round, and reactive to light.   Cardiovascular:      Rate and Rhythm: Normal rate and regular rhythm.      Comments: Palpable femoral pulses bilaterally  Palpable radial pulses bilaterally  Palpable pedal pulses bilaterally  Pulmonary:      Effort: Pulmonary effort is normal. No respiratory distress.   Abdominal:      General: Abdomen is flat.      Palpations: Abdomen is soft.   Musculoskeletal:      Cervical back: Normal range of motion and neck supple.      Comments: Right heel pressure wound involving skin and subcutaneous tissues; no erythema or drainage, no fluctuance   Skin:     General: Skin is warm and dry.      Capillary Refill: Capillary refill takes less than 2 seconds.   Neurological:      General: No focal deficit present.      Mental Status: He is alert and oriented to person, place, and time.   Psychiatric:         Mood and Affect: Mood normal.         Behavior: Behavior normal.         Results from last 7 days   Lab Units 03/03/22  0305 03/02/22  1852 03/02/22  1532 03/02/22  1441 03/02/22  1359 03/02/22  1332 03/02/22  1307 03/02/22  1115 02/28/22  0844    WBC 10*3/mm3 12.69* 15.43* 15.61*  --   --   --   --   --  9.38   HEMOGLOBIN g/dL 8.7* 10.2* 10.8*  --   --   --   --   --  10.4*   HEMOGLOBIN, POC g/dL  --   --   --  6.8* 6.8* 6.8* 6.8*   < >  --    PLATELETS 10*3/mm3 175 225 208  --   --   --   --   --  371    < > = values in this interval not displayed.     Results from last 7 days   Lab Units 03/03/22  0305 03/02/22  1852 03/02/22  1532 02/28/22  0844   SODIUM mmol/L 146* 148* 140 136   POTASSIUM mmol/L 4.1 4.3 4.9 4.6   CHLORIDE mmol/L 111* 111* 108* 102   CO2 mmol/L 22.0 25.0 19.0* 23.9   BUN mg/dL 26* 26* 26* 20   CREATININE mg/dL 1.14 1.19 1.14 0.99   GLUCOSE mg/dL 114* 123* 190* 173*   Estimated Creatinine Clearance: 69.2 mL/min (by C-G formula based on SCr of 1.14 mg/dL).  Results from last 7 days   Lab Units 03/03/22 0305 03/02/22 1532 02/28/22  0844   PROTIME Seconds 15.0* 15.6* 12.8   INR  1.18* 1.24* 0.97       Imaging Studies:      XR Foot 3+ View Right [[id number]] (Order [id number])  Order  Status: Final result       Patient Location    Patient Class Location   Inpatient [facility] CARDIOVASC UNIT, 2228, 1    [phone]     Study Notes       Kristel Sumner on 3/3/2022  5:26 PM   right heel ulceration   Appointment Information      PACS Images     Radiology Images    Study Result    Narrative & Impression   RIGHT FOOT X-RAYS     CLINICAL HISTORY: Right heel ulceration.     3 views were obtained. There is a tiny dorsal calcaneal heel spur. No  other bony or articular abnormalities are identified. There is no  evidence of recent or old fracture or subluxation. There is no  radiographic evidence of osteomyelitis. No soft tissue gas is  identified.     This report was finalized on 3/3/2022 5:57 PM by Dr. Kaleb Monte M.D.            Active Hospital Problems    Diagnosis  POA   • **Coronary artery disease involving native coronary artery of native heart without angina pectoris [I25.10]  Unknown   • Coronary artery disease of native heart with stable angina  pectoris (HCC) [I25.118]  Yes      Resolved Hospital Problems   No resolved problems to display.     Problem Points:  1:  Patient has an established problem, stable or improving  Total problem points:1    Data Points:  1:  I have reviewed or order clinical lab test  1:  I have reviewed or order radiology test (except heart catheterization or echo)  2:  I have personally and independently review of image, tracing, or specimen  Total data points:4 or more    Risk Points:  Low:  One acute uncomplicated illness/injury    MDM Prob point Data point Risk   SF 1 1 Minimal   Low 2 2 Low   Mod 3 3 Moderate   High 4 4 High     Code MDM History Exam Time   72925 SF/Low Detailed Detailed 30   70897 Mod Comprehensive Comprehensive 50   82329 High Comprehensive Comprehensive 70     Detailed history:  4 elements HPI or status of 3 chronic problems; 2-9 system ROS  Comprehensive:  4 elements HPI or status of 3 chronic problems;  10 system ROS    Detailed Exam:  12 findings from any organ system  Comprehensive Exam:  2 findings from each of 9 systems.     [place]    Assessment/Plan       Coronary artery disease involving native coronary artery of native heart without angina pectoris    Coronary artery disease of native heart with stable angina pectoris (HCC)        57 y.o. male with a history of cerebral palsy, diabetes mellitus, chronic kidney disease, ischemic cardiomyopathy, stroke, and multivessel coronary artery disease status post 7 vessel CABG on 2022 by Dr. Hewitt with a right heel pressure ulceration he developed several months ago during a long hospitalization.    -non invasive arterial testing demonstrates normal right BETY with 150 mmHg toe pressures, which is adequate for wound healing.  I do not recommend any further vascular testing or revascularization at this time.     -Addendum: right foot xray reviewed, no fracture or evidence of osteomyelitis.  Dorsal calcaneal bone spur not contributing to this  ulceration    -I have ordered for wound care nursing to see him to recommend a suitable dressing.  We will need to continue floating heels when he is supine.  We discussed ambulation and I don't think there are any prosthetics or orthotics that would help offload this area, so I think he should continue ambulation as tolerated.    -would recommend outpatient follow up with wound care for wound monitoring until wound healed.       I discussed the patients findings and my recommendations with patient and family.  Please call my office with any question: [phone]    Ewelina Reza MD  03/03/22  15:36 EST

## 2022-03-03 NOTE — CONSULTS
Met with patient, discussed benefits of cardiac rehab. Provided phase II information along with the contact information for cardiac rehab at [facility] on [facility]. Patient requested for referral to be sent. Explained if receiving home health would not be able to attend cardiac rehab until finished with home health.

## 2022-03-03 NOTE — THERAPY EVALUATION
POST SEDATION DISCHARGE INSTRUCTIONS      Refer to this sheet in the next few weeks.  These instructions provide you with information on caring for yourself after your procedure.  Your health care provider may also give you more specific instructions.  Your treatment has been planned according to current medical practices, but problems sometimes occur.  Call your health care provider if you have any problems or questions after your procedure.    WHAT TO EXPECT AFTER THE PROCEDURE:  You may feel sleepy, clumsy and/or have poor balance for several hours.  Vomiting may occur if you eat too soon after the procedure.    HOME CARE INSTRUCTIONS:   DO NOT participate in any activities where you could become injured for at least            24 hours  DO NOT:  Drive  Swim  Ride a bicycle  Operate heavy machinery  Cook  Use power tools  Climb a ladder  Work in high places  DO NOT make important decisions or sign legal documents until you are improved.     If you vomit, drink water, juice or soup when you can drink without vomiting.  Make sure you have little or no nausea before eating solid foods.    Only take over the counter or prescription medicines for pain, discomfort, or fever as directed by your health care provider.    Make sure you and your family fully understand everything about the medicines given to you, including what side effects may occur.    You should not drink alcohol, take sleeping pills or take medicines that cause drowsiness for at least 24 hours.    If you smoke, do not smoke without supervision.    If you are feeling better, you may resume normal activities 24 hours after you were sedated.    Keep all appointments with your health care provider.    SEEK MEDICAL CARE IF:  Your skin is pale or bluish in color  You continue to feel nauseous or vomit  Your pain is getting worse and is not helped by medicine  You have bleeding and swelling  You are still sleepy or feeling clumsy after 24 hours    SEEK  "Patient Name: Pierce Morse  : 1965    MRN: [id number]                              Today's Date: 3/3/2022       Admit Date: 3/2/2022    Visit Dx:     ICD-10-CM ICD-9-CM   1. Coronary artery disease of native heart with stable angina pectoris, unspecified vessel or lesion type ([facility])  I25.118 414.01     413.9     Patient Active Problem List   Diagnosis   • Coronary artery disease involving native coronary artery of native heart without angina pectoris   • Ischemic cardiomyopathy   • Chronic stable angina ([facility])   • Type 2 diabetes mellitus with diabetic peripheral angiopathy without gangrene, with long-term current use of insulin ([facility])   • Cerebral palsy ([facility])   • Coronary artery disease of native heart with stable angina pectoris ([facility])     Past Medical History:   Diagnosis Date   • Alcohol abuse     SOBRIETY SINCE AUG 2021   • CAD (coronary artery disease)    • CP (cerebral palsy) ([facility])    • Depression    • Diabetes mellitus ([facility])    • DVT (deep venous thrombosis) ([facility])     PT STATES \"WAS RULED OUT\" ON RIGHT LEG   • Heel ulcer ([facility])     RIGHT.  STATES IN WOUND CARE WITH HOME HEALTH   • Hyperlipidemia    • Hypertension    • Ischemic cardiomyopathy    • PVD (peripheral vascular disease) ([facility])    • Renal insufficiency    • Stroke ([facility]) 2021     Past Surgical History:   Procedure Laterality Date   • CLUB FOOT RELEASE Left     X6   • CORONARY ARTERY BYPASS GRAFT N/A 3/2/2022    Procedure: CORONARY ARTERY BYPASS GRAFT X5, WITH LEFT INTERNAL MAMMARY HARVEST, MIDLINE STERNOTOMY,  INTRAOP MARIELA, PRP;  Surgeon: Jr Eliseo Hewitt MD;  Location: [facility];  Service: Cardiothoracic;  Laterality: N/A;   • ENDOSCOPY     • EYE MUSCLE SURGERY Right       General Information     Row Name 22 1116          Physical Therapy Time and Intention    Document Type evaluation  -EM     Mode of Treatment individual therapy; physical therapy  -EM     Row Name 22 1116          General Information    Patient Profile " IMMEDIATE MEDICAL CARE IF:  You develop a rash  You have difficulty breathing  You develop any type of allergic problem.    MAKE SURE YOU:  Understand these instructions  Will watch you condition.  Will get help right away if you are not doing well or get worse      POST LUNG BIOPSY DISCHARGE INTRUCTIONS    During your biopsy a needle was used to remove a few cells to be sent to the pathologist for analysis.  This report will be given to your doctor within 1-2 days.    1. Avoid lifting heavy objects and activities that would cause straining to the puncture area for 24 hours.    2. Leave the dressing in place for 24 hours.  Do NOT get the area wet, No shower or tub bath for 24 hours.  After 24 hours please remove the dressing and leave open to air.      3. Report any increase in pain, bleeding or shortness of breath immediately to your doctor or report to the Emergency Department with this sheet.     4. Tylenol or Ibuprofen may be taken for discomfort as needed every 4-6 hours today.  Avoid aspirin until tomorrow.    5. If you have any problems or questions call [phone].  If no one answers, please leave a message and the nurse will call you back when she can.           Reviewed yes  -EM     Prior Level of Function independent:  uses rwx sometimes  -EM     Existing Precautions/Restrictions fall; cardiac; sternal  -EM     Row Name 03/03/22 1116          Living Environment    Lives With parent(s)  states he is currently staying with his parents  -EM     Row Name 03/03/22 1116          Home Main Entrance    Number of Stairs, Main Entrance three  -EM     Stair Railings, Main Entrance railings safe and in good condition  -EM     Row Name 03/03/22 1116          Stairs Within Home, Primary    Number of Stairs, Within Home, Primary three  -EM     Row Name 03/03/22 1116          Cognition    Orientation Status (Cognition) oriented x 3  -EM     Row Name 03/03/22 1116          Safety Issues, Functional Mobility    Impairments Affecting Function (Mobility) balance; endurance/activity tolerance; strength; pain  -EM           User Key  (r) = Recorded By, (t) = Taken By, (c) = Cosigned By    Initials Name Provider Type    Kacie Leonard PT Physical Therapist               Mobility     Row Name 03/03/22 1117          Bed Mobility    Bed Mobility sit-supine  -EM     Sit-Supine Gallia (Bed Mobility) moderate assist (50% patient effort); 2 person assist  -EM     Assistive Device (Bed Mobility) head of bed elevated  -EM     Row Name 03/03/22 1117          Sit-Stand Transfer    Sit-Stand [place] (Transfers) minimum assist (75% patient effort); 2 person assist; verbal cues  -EM     Row Name 03/03/22 1117          Gait/Stairs (Locomotion)    [place] Level (Gait) minimum assist (75% patient effort); 2 person assist  -EM     Distance in Feet (Gait) 50  -EM     Deviations/Abnormal Patterns (Gait) stride length decreased; gait speed decreased  -EM     Bilateral Gait Deviations forward flexed posture  -EM           User Key  (r) = Recorded By, (t) = Taken By, (c) = Cosigned By    Initials Name Provider Type    Kacie Leonard PT Physical Therapist                Obj/Interventions     Row Name 03/03/22 1118          Range of Motion Comprehensive    General Range of Motion no range of motion deficits identified  -EM     Row Name 03/03/22 1118          Strength Comprehensive (MMT)    General Manual Muscle Testing (MMT) Assessment no strength deficits identified  -EM     Comment, General Manual Muscle Testing (MMT) Assessment no focal deficits identified, has history of CP with LLE weakness but able to lift against gravity  -EM     Row Name 03/03/22 1118          Motor Skills    Therapeutic Exercise other (see comments)  5 reps per cardiac protocol, level 2  -EM     Row Name 03/03/22 1118          Balance    Balance Assessment sitting dynamic balance; standing static balance; standing dynamic balance  -EM     Dynamic Sitting Balance WNL  -EM     Static Standing Balance mild impairment  -EM     Dynamic Standing Balance mild impairment  -EM     [facility] Name 03/03/22 1118          Sensory Assessment (Somatosensory)    Sensory Assessment (Somatosensory) sensation intact  -EM           User Key  (r) = Recorded By, (t) = Taken By, (c) = Cosigned By    Initials Name Provider Type    EM Kacie Live PT Physical Therapist               Goals/Plan     [facility] Name 03/03/22 1123          Patient Education Goal (PT)    Activity (Patient Education Goal, PT) Patient able to complete level 5 per cardiac protocol  -EM     Fisher/Cues/Accuracy (Memory Goal 2, PT) demonstrates adequately; verbalizes understanding  -EM     Time Frame (Patient Education Goal, PT) 1 week  -EM           User Key  (r) = Recorded By, (t) = Taken By, (c) = Cosigned By    Initials Name Provider Type    Kacie Leonard PT Physical Therapist               Clinical Impression     Row Name 03/03/22 1119          Pain    Additional Documentation Pain Scale: Numbers Pre/Post-Treatment (Group)  -EM     [facility] Name 03/03/22 1119          Pain Scale: Numbers Pre/Post-Treatment    Pretreatment Pain Rating 4/10  -EM      Pain Location chest; abdomen  -EM     Pre/Posttreatment Pain Comment pt also c/o soreness in LLE with standing  -EM     Pain Intervention(s) Medication (See MAR); Repositioned  -EM     Row Name 03/03/22 1119          Plan of Care Review    Plan of Care Reviewed With patient  -EM     Outcome Summary Patient is 56 yo male admitted to [facility] for CABG x5. PMH significant for DM, CVA, CP with residual LLE weakness, HTN, recent COVID infection. Patient reports he uses a rwx sometimes, is staying with parents currently, has 3 steps to enter home and 3 steps inside home. Today, patient performed 5 reps per cardiac protocol, sit to stand with minAx2 and cues for sternal precautions, ambulated about 50 feet with minAx2. Pt has impairments consisting of generalized post op weakness and pain, decreased activity tolerance, decreased balance and would benefit from skilled PT. D/c plan is to return home with parents and HH.  -EM     Row Name 03/03/22 1119          Therapy Assessment/Plan (PT)    Patient/Family Therapy Goals Statement (PT) get stronger  -EM     Rehab Potential (PT) good, to achieve stated therapy goals  -EM     Criteria for Skilled Interventions Met (PT) yes; skilled treatment is necessary  -EM     Row Name 03/03/22 1119          Vital Signs    Pre Systolic BP Rehab 103  -EM     Pre Treatment Diastolic BP 67  -EM     Pretreatment Heart Rate (beats/min) 80  -EM     Posttreatment Heart Rate (beats/min) 80  -EM     Pre SpO2 (%) 96  -EM     Post SpO2 (%) 91  -EM     Row Name 03/03/22 1119          Positioning and Restraints    Pre-Treatment Position sitting in chair/recliner  -EM     Post Treatment Position bed  -EM     In Bed supine; with nsg  -EM           User Key  (r) = Recorded By, (t) = Taken By, (c) = Cosigned By    Initials Name Provider Type    EM Kacie Live, PT Physical Therapist               Outcome Measures     Row Name 03/03/22 1123          How much help from another person do you currently  need...    Turning from your back to your side while in flat bed without using bedrails? 3  -EM     Moving from lying on back to sitting on the side of a flat bed without bedrails? 2  -EM     Moving to and from a bed to a chair (including a wheelchair)? 2  -EM     Standing up from a chair using your arms (e.g., wheelchair, bedside chair)? 3  -EM     Climbing 3-5 steps with a railing? 2  -EM     To walk in hospital room? 2  -EM     AM-PAC 6 Clicks Score (PT) 14  -EM     Row Name 03/03/22 1123          Functional Assessment    Outcome Measure Options AM-PAC 6 Clicks Basic Mobility (PT)  -EM           User Key  (r) = Recorded By, (t) = Taken By, (c) = Cosigned By    Initials Name Provider Type    EM Kacie Live PT Physical Therapist                             Physical Therapy Education                 Title: PT OT SLP Therapies (In Progress)     Topic: Physical Therapy (In Progress)     Point: Mobility training (In Progress)     Learning Progress Summary           Patient Acceptance, E, NR by EM at 3/3/2022 1124                   Point: Home exercise program (In Progress)     Learning Progress Summary           Patient Acceptance, E, NR by EM at 3/3/2022 1124                   Point: Body mechanics (Not Started)     Learner Progress:  Not documented in this visit.          Point: Precautions (Not Started)     Learner Progress:  Not documented in this visit.                      User Key     Initials Effective Dates Name Provider Type Discipline    EM 06/16/21 -  Kacie Live PT Physical Therapist PT              PT Recommendation and Plan  Planned Therapy Interventions (PT): bed mobility training, gait training, home exercise program, patient/family education, transfer training  Plan of Care Reviewed With: patient  Outcome Summary: Patient is 56 yo male admitted to [facility] for CABG x5. PMH significant for DM, CVA, CP with residual LLE weakness, HTN, recent COVID infection. Patient reports he uses a rwx  sometimes, is staying with parents currently, has 3 steps to enter home and 3 steps inside home. Today, patient performed 5 reps per cardiac protocol, sit to stand with minAx2 and cues for sternal precautions, ambulated about 50 feet with minAx2. Pt has impairments consisting of generalized post op weakness and pain, decreased activity tolerance, decreased balance and would benefit from skilled PT. D/c plan is to return home with parents and HH.     Time Calculation:    PT Charges     Row Name 03/03/22 1125             Time Calculation    Start Time 0918  -EM      Stop Time 0935  -EM      Time Calculation (min) 17 min  -EM      PT Received On 03/03/22  -EM      PT - Next Appointment 03/04/22  -EM      PT Goal Re-Cert Due Date 03/10/22  -EM              Time Calculation- PT    Total Timed Code Minutes- PT 10 minute(s)  -EM              Timed Charges    78843 - PT Therapeutic Activity Minutes 10  -EM              Total Minutes    Timed Charges Total Minutes 10  -EM       Total Minutes 10  -EM            User Key  (r) = Recorded By, (t) = Taken By, (c) = Cosigned By    Initials Name Provider Type    EM Kacie Live, PT Physical Therapist              Therapy Charges for Today     Code Description Service Date Service Provider Modifiers Qty    86090262818 HC PT THERAPEUTIC ACT EA 15 MIN 3/3/2022 Kacie Live, PT GP 1    03156369649 HC PT EVAL MOD COMPLEXITY 2 3/3/2022 Kacie Live PT GP 1          PT G-Codes  Outcome Measure Options: AM-PAC 6 Clicks Basic Mobility (PT)  AM-PAC 6 Clicks Score (PT): 14    Kacie Live PT  3/3/2022

## 2022-03-04 ENCOUNTER — APPOINTMENT (OUTPATIENT)
Dept: GENERAL RADIOLOGY | Facility: HOSPITAL | Age: 57
End: 2022-03-04

## 2022-03-04 LAB
ANION GAP SERPL CALCULATED.3IONS-SCNC: 9.2 MMOL/L (ref 5–15)
BUN SERPL-MCNC: 30 MG/DL (ref 6–20)
BUN/CREAT SERPL: 22.4 (ref 7–25)
CALCIUM SPEC-SCNC: 8.7 MG/DL (ref 8.6–10.5)
CHLORIDE SERPL-SCNC: 101 MMOL/L (ref 98–107)
CO2 SERPL-SCNC: 22.8 MMOL/L (ref 22–29)
CREAT SERPL-MCNC: 1.34 MG/DL (ref 0.76–1.27)
DEPRECATED RDW RBC AUTO: 43.6 FL (ref 37–54)
EGFRCR SERPLBLD CKD-EPI 2021: 62.6 ML/MIN/1.73
ERYTHROCYTE [DISTWIDTH] IN BLOOD BY AUTOMATED COUNT: 13.6 % (ref 12.3–15.4)
GLUCOSE BLDC GLUCOMTR-MCNC: 127 MG/DL (ref 70–130)
GLUCOSE BLDC GLUCOMTR-MCNC: 144 MG/DL (ref 70–130)
GLUCOSE BLDC GLUCOMTR-MCNC: 165 MG/DL (ref 70–130)
GLUCOSE BLDC GLUCOMTR-MCNC: 270 MG/DL (ref 70–130)
GLUCOSE SERPL-MCNC: 216 MG/DL (ref 65–99)
HCT VFR BLD AUTO: 25.5 % (ref 37.5–51)
HGB BLD-MCNC: 8.7 G/DL (ref 13–17.7)
MCH RBC QN AUTO: 30.4 PG (ref 26.6–33)
MCHC RBC AUTO-ENTMCNC: 34.1 G/DL (ref 31.5–35.7)
MCV RBC AUTO: 89.2 FL (ref 79–97)
PLATELET # BLD AUTO: 189 10*3/MM3 (ref 140–450)
PMV BLD AUTO: 9.7 FL (ref 6–12)
POTASSIUM SERPL-SCNC: 4 MMOL/L (ref 3.5–5.2)
QT INTERVAL: 376 MS
RBC # BLD AUTO: 2.86 10*6/MM3 (ref 4.14–5.8)
SODIUM SERPL-SCNC: 133 MMOL/L (ref 136–145)
WBC NRBC COR # BLD: 12.79 10*3/MM3 (ref 3.4–10.8)

## 2022-03-04 PROCEDURE — 82962 GLUCOSE BLOOD TEST: CPT

## 2022-03-04 PROCEDURE — 94799 UNLISTED PULMONARY SVC/PX: CPT

## 2022-03-04 PROCEDURE — 97116 GAIT TRAINING THERAPY: CPT

## 2022-03-04 PROCEDURE — 80048 BASIC METABOLIC PNL TOTAL CA: CPT | Performed by: NURSE PRACTITIONER

## 2022-03-04 PROCEDURE — 63710000001 INSULIN LISPRO (HUMAN) PER 5 UNITS: Performed by: HOSPITALIST

## 2022-03-04 PROCEDURE — 25010000002 CEFAZOLIN IN DEXTROSE 2-4 GM/100ML-% SOLUTION: Performed by: NURSE PRACTITIONER

## 2022-03-04 PROCEDURE — 93005 ELECTROCARDIOGRAM TRACING: CPT | Performed by: NURSE PRACTITIONER

## 2022-03-04 PROCEDURE — 25010000002 FUROSEMIDE PER 20 MG: Performed by: NURSE PRACTITIONER

## 2022-03-04 PROCEDURE — 63710000001 INSULIN LISPRO (HUMAN) PER 5 UNITS: Performed by: NURSE PRACTITIONER

## 2022-03-04 PROCEDURE — 25010000002 ENOXAPARIN PER 10 MG: Performed by: NURSE PRACTITIONER

## 2022-03-04 PROCEDURE — 85027 COMPLETE CBC AUTOMATED: CPT | Performed by: NURSE PRACTITIONER

## 2022-03-04 PROCEDURE — 71045 X-RAY EXAM CHEST 1 VIEW: CPT

## 2022-03-04 RX ORDER — NITROGLYCERIN 0.4 MG/1
0.4 TABLET SUBLINGUAL
Status: DISCONTINUED | OUTPATIENT
Start: 2022-03-04 | End: 2022-03-07 | Stop reason: HOSPADM

## 2022-03-04 RX ORDER — FUROSEMIDE 10 MG/ML
40 INJECTION INTRAMUSCULAR; INTRAVENOUS ONCE
Status: COMPLETED | OUTPATIENT
Start: 2022-03-04 | End: 2022-03-04

## 2022-03-04 RX ORDER — POTASSIUM CHLORIDE 750 MG/1
20 TABLET, FILM COATED, EXTENDED RELEASE ORAL ONCE
Status: COMPLETED | OUTPATIENT
Start: 2022-03-04 | End: 2022-03-04

## 2022-03-04 RX ORDER — CARVEDILOL 3.12 MG/1
3.12 TABLET ORAL EVERY 12 HOURS
Status: DISCONTINUED | OUTPATIENT
Start: 2022-03-04 | End: 2022-03-05

## 2022-03-04 RX ADMIN — INSULIN GLARGINE-YFGN 15 UNITS: 100 INJECTION, SOLUTION SUBCUTANEOUS at 20:04

## 2022-03-04 RX ADMIN — CYCLOBENZAPRINE 10 MG: 10 TABLET, FILM COATED ORAL at 03:58

## 2022-03-04 RX ADMIN — GUAIFENESIN 1200 MG: 600 TABLET, EXTENDED RELEASE ORAL at 09:04

## 2022-03-04 RX ADMIN — CARVEDILOL 3.12 MG: 3.12 TABLET, FILM COATED ORAL at 20:04

## 2022-03-04 RX ADMIN — CYCLOBENZAPRINE 10 MG: 10 TABLET, FILM COATED ORAL at 12:01

## 2022-03-04 RX ADMIN — HYDROCODONE BITARTRATE AND ACETAMINOPHEN 2 TABLET: 5; 325 TABLET ORAL at 18:26

## 2022-03-04 RX ADMIN — INSULIN GLARGINE-YFGN 15 UNITS: 100 INJECTION, SOLUTION SUBCUTANEOUS at 09:04

## 2022-03-04 RX ADMIN — PANTOPRAZOLE SODIUM 40 MG: 40 TABLET, DELAYED RELEASE ORAL at 08:32

## 2022-03-04 RX ADMIN — MUPIROCIN 1 APPLICATION: 20 OINTMENT TOPICAL at 09:04

## 2022-03-04 RX ADMIN — HYDROCODONE BITARTRATE AND ACETAMINOPHEN 2 TABLET: 5; 325 TABLET ORAL at 08:32

## 2022-03-04 RX ADMIN — CEFAZOLIN SODIUM 2 G: 2 INJECTION, SOLUTION INTRAVENOUS at 07:49

## 2022-03-04 RX ADMIN — INSULIN LISPRO 6 UNITS: 100 INJECTION, SOLUTION INTRAVENOUS; SUBCUTANEOUS at 17:13

## 2022-03-04 RX ADMIN — FUROSEMIDE 40 MG: 10 INJECTION, SOLUTION INTRAMUSCULAR; INTRAVENOUS at 12:00

## 2022-03-04 RX ADMIN — INSULIN LISPRO 6 UNITS: 100 INJECTION, SOLUTION INTRAVENOUS; SUBCUTANEOUS at 07:11

## 2022-03-04 RX ADMIN — CHLORHEXIDINE GLUCONATE 15 ML: 1.2 RINSE ORAL at 09:04

## 2022-03-04 RX ADMIN — CHLORHEXIDINE GLUCONATE 15 ML: 1.2 RINSE ORAL at 20:06

## 2022-03-04 RX ADMIN — INSULIN LISPRO 6 UNITS: 100 INJECTION, SOLUTION INTRAVENOUS; SUBCUTANEOUS at 12:01

## 2022-03-04 RX ADMIN — POTASSIUM CHLORIDE 20 MEQ: 10 TABLET, EXTENDED RELEASE ORAL at 12:00

## 2022-03-04 RX ADMIN — HYDROCODONE BITARTRATE AND ACETAMINOPHEN 2 TABLET: 5; 325 TABLET ORAL at 13:11

## 2022-03-04 RX ADMIN — ATORVASTATIN CALCIUM 40 MG: 20 TABLET, FILM COATED ORAL at 20:04

## 2022-03-04 RX ADMIN — GUAIFENESIN 1200 MG: 600 TABLET, EXTENDED RELEASE ORAL at 20:04

## 2022-03-04 RX ADMIN — ASPIRIN 81 MG: 81 TABLET, COATED ORAL at 09:04

## 2022-03-04 RX ADMIN — OXYCODONE 10 MG: 5 TABLET ORAL at 03:58

## 2022-03-04 RX ADMIN — ENOXAPARIN SODIUM 40 MG: 100 INJECTION SUBCUTANEOUS at 17:13

## 2022-03-04 RX ADMIN — CARVEDILOL 3.12 MG: 3.12 TABLET, FILM COATED ORAL at 09:04

## 2022-03-04 RX ADMIN — MUPIROCIN 1 APPLICATION: 20 OINTMENT TOPICAL at 20:06

## 2022-03-04 RX ADMIN — DOCUSATE SODIUM 50MG AND SENNOSIDES 8.6MG 2 TABLET: 8.6; 5 TABLET, FILM COATED ORAL at 20:04

## 2022-03-04 NOTE — PLAN OF CARE
Goal Outcome Evaluation:  Plan of Care Reviewed With: patient        Progress: improving  Outcome Summary: Pt agreeable to PT this AM and able to progress gait distance. Pt required min A for STS and ambulated 120ft with min A x2 for equipment. Pt tolerated exercises well and demonstrated improved steadiness with gait with no overt LOB. Pt left [facility] with all needs met. Pt will continue to benefit from skilled PT to address functional deficits. PT recommending D/C home with HHPT.    Patient was intermittently wearing a face mask during this therapy encounter. Therapist used appropriate personal protective equipment including eye protection, mask, and gloves.  Mask used was standard procedure mask. Appropriate PPE was worn during the entire therapy session. Hand hygiene was completed before and after therapy session. Patient is not in enhanced droplet precautions.

## 2022-03-04 NOTE — PROGRESS NOTES
LOS: 2 days   Patient Care Team:  Farhad Mark MD as PCP - General (Internal Medicine)    Chief Complaint: post op    Subjective:  Symptoms:  No shortness of breath or cough.    Diet:  Adequate intake.  No nausea or vomiting.    Activity level: Impaired due to weakness.    Pain:  He complains of pain that is mild.  Pain is well controlled.      Vital Signs  Temp:  [96.8 °F (36 °C)-99.5 °F (37.5 °C)] 99.5 °F (37.5 °C)  Heart Rate:  [71-89] 88  Resp:  [14-15] 15  BP: ()/(48-91) 148/78  Arterial Line BP: (122)/(50) 122/50  Body mass index is 21.81 kg/m².    Intake/Output Summary (Last 24 hours) at 3/4/2022 0811  Last data filed at 3/4/2022 0645  Gross per 24 hour   Intake 970 ml   Output 1970 ml   Net -1000 ml     No intake/output data recorded.    Chest tube drainage last 8 hours: 290        03/03/22  0600 03/04/22  0617   Weight: 66.8 kg (147 lb 4.3 oz) 68.9 kg (152 lb)         Objective:  General Appearance:  Comfortable and in no acute distress.    Vital signs: (most recent): Blood pressure 148/78, pulse 88, temperature 99.5 °F (37.5 °C), temperature source Oral, resp. rate 15, weight 68.9 kg (152 lb), SpO2 98 %.  Vital signs are normal.  No fever.    Output: Producing urine.    Lungs:  Normal effort and normal respiratory rate.  There are rales and decreased breath sounds.    Heart: Normal rate.  Regular rhythm.  (SR on tele monitor)  Abdomen: Abdomen is soft.  Bowel sounds are normal.     Extremities: There is dependent edema (trace bilateral LE).    Pulses: Distal pulses are intact.    Neurological: Patient is alert and oriented to person, place and time.    Skin:  Warm and dry.  (Sternal dressing clean, dry, and intact)        Results Review:        WBC WBC   Date Value Ref Range Status   03/04/2022 12.79 (H) 3.40 - 10.80 10*3/mm3 Final   03/03/2022 12.69 (H) 3.40 - 10.80 10*3/mm3 Final   03/02/2022 15.43 (H) 3.40 - 10.80 10*3/mm3 Final   03/02/2022 15.61 (H) 3.40 - 10.80 10*3/mm3 Final      HGB  Hemoglobin   Date Value Ref Range Status   03/04/2022 8.7 (L) 13.0 - 17.7 g/dL Final   03/03/2022 8.7 (L) 13.0 - 17.7 g/dL Final   03/02/2022 10.2 (L) 13.0 - 17.7 g/dL Final   03/02/2022 10.8 (L) 13.0 - 17.7 g/dL Final   03/02/2022 6.8 (L) 12.0 - 17.0 g/dL Final   03/02/2022 6.8 (L) 12.0 - 17.0 g/dL Final   03/02/2022 6.8 (L) 12.0 - 17.0 g/dL Final   03/02/2022 6.8 (L) 12.0 - 17.0 g/dL Final   03/02/2022 6.8 (L) 12.0 - 17.0 g/dL Final   03/02/2022 8.2 (L) 12.0 - 17.0 g/dL Final      HCT Hematocrit   Date Value Ref Range Status   03/04/2022 25.5 (L) 37.5 - 51.0 % Final   03/03/2022 25.9 (L) 37.5 - 51.0 % Final   03/02/2022 30.9 (L) 37.5 - 51.0 % Final   03/02/2022 31.2 (L) 37.5 - 51.0 % Final   03/02/2022 20 (L) 38 - 51 % Final   03/02/2022 20 (L) 38 - 51 % Final   03/02/2022 20 (L) 38 - 51 % Final   03/02/2022 20 (L) 38 - 51 % Final   03/02/2022 20 (L) 38 - 51 % Final   03/02/2022 24 (L) 38 - 51 % Final      Platelets Platelets   Date Value Ref Range Status   03/04/2022 189 140 - 450 10*3/mm3 Final   03/03/2022 175 140 - 450 10*3/mm3 Final   03/02/2022 225 140 - 450 10*3/mm3 Final   03/02/2022 208 140 - 450 10*3/mm3 Final        PT/INR:    Protime   Date Value Ref Range Status   03/03/2022 15.0 (H) 11.7 - 14.2 Seconds Final   03/02/2022 15.6 (H) 11.7 - 14.2 Seconds Final   /  INR   Date Value Ref Range Status   03/03/2022 1.18 (H) 0.90 - 1.10 Final   03/02/2022 1.24 (H) 0.90 - 1.10 Final       Sodium Sodium   Date Value Ref Range Status   03/03/2022 146 (H) 136 - 145 mmol/L Final   03/02/2022 148 (H) 136 - 145 mmol/L Final   03/02/2022 140 136 - 145 mmol/L Final      Potassium Potassium   Date Value Ref Range Status   03/03/2022 4.1 3.5 - 5.2 mmol/L Final   03/02/2022 4.3 3.5 - 5.2 mmol/L Final   03/02/2022 4.9 3.5 - 5.2 mmol/L Final      Chloride Chloride   Date Value Ref Range Status   03/03/2022 111 (H) 98 - 107 mmol/L Final   03/02/2022 111 (H) 98 - 107 mmol/L Final   03/02/2022 108 (H) 98 - 107 mmol/L Final       Bicarbonate CO2   Date Value Ref Range Status   03/03/2022 22.0 22.0 - 29.0 mmol/L Final   03/02/2022 25.0 22.0 - 29.0 mmol/L Final   03/02/2022 19.0 (L) 22.0 - 29.0 mmol/L Final      BUN BUN   Date Value Ref Range Status   03/03/2022 26 (H) 6 - 20 mg/dL Final   03/02/2022 26 (H) 6 - 20 mg/dL Final   03/02/2022 26 (H) 6 - 20 mg/dL Final      Creatinine Creatinine   Date Value Ref Range Status   03/03/2022 1.14 0.76 - 1.27 mg/dL Final   03/02/2022 1.19 0.76 - 1.27 mg/dL Final   03/02/2022 1.14 0.76 - 1.27 mg/dL Final      Calcium Calcium   Date Value Ref Range Status   03/03/2022 8.5 (L) 8.6 - 10.5 mg/dL Final   03/02/2022 9.3 8.6 - 10.5 mg/dL Final   03/02/2022 9.7 8.6 - 10.5 mg/dL Final      Magnesium Magnesium   Date Value Ref Range Status   03/03/2022 2.8 (H) 1.6 - 2.6 mg/dL Final   03/02/2022 3.1 (H) 1.6 - 2.6 mg/dL Final   03/02/2022 3.5 (H) 1.6 - 2.6 mg/dL Final          aspirin, 81 mg, Oral, Daily  atorvastatin, 40 mg, Oral, Nightly  ceFAZolin, 2 g, Intravenous, Q8H  chlorhexidine, 15 mL, Mouth/Throat, Q12H  enoxaparin, 40 mg, Subcutaneous, Daily  guaiFENesin, 1,200 mg, Oral, Q12H  insulin glargine, 15 Units, Subcutaneous, Q12H  insulin lispro, 0-9 Units, Subcutaneous, 4x Daily With Meals & Nightly  insulin lispro, 6 Units, Subcutaneous, TID With Meals  metoprolol tartrate, 25 mg, Oral, Q12H  mupirocin, , Each Nare, BID  pantoprazole, 40 mg, Oral, QAM  senna-docusate sodium, 2 tablet, Oral, Nightly      sodium chloride, 30 mL/hr, Last Rate: 15 mL/hr (03/02/22 1632)  sodium chloride, 30 mL/hr        Patient Active Problem List   Diagnosis Code   • Coronary artery disease involving native coronary artery of native heart without angina pectoris I25.10   • Ischemic cardiomyopathy I25.5   • Chronic stable angina ([facility]) I20.8   • Type 2 diabetes mellitus with diabetic peripheral angiopathy without gangrene, with long-term current use of insulin ([facility]) E11.51, Z79.4   • Cerebral palsy ([facility]) G80.9   • Coronary artery  disease of native heart with stable angina pectoris ([facility]) I25.118   • Heel ulcer ([facility]) [id number]       Assessment & Plan   -Severe multivessel CAD-- s/p CABGx7 LIMA/LSVG- POD#2 [place]  -Diabetes   -CVA with peripheral vision disturbance   -cerebral palsy with left sided weakness   -non-healing right foot ulceration-- vascular consulted   -post op anemia- expected acute blood loss   -leukocytosis--reactive     Looks good this morning-- up in the chair   2L NC-- wean as able  Hypertensive overnight and this morning--will change to carvelilol  Mobilize/encourage pulmonary toilet  Hyperglycemic overnight--insulin adjusted this morning per internal med  IV diurese  Discontinue central line and manzo  Re-evaluate chest tube output after ambulation    Stephanie Boyce, TAY  03/04/22  08:11 EST

## 2022-03-04 NOTE — NURSING NOTE
CWON note: pt seen for evaluation of healing full thickness pressure injury to rt heel POA. Pt reports the wound used to be a big hole, unsure of original stage, possibly a stage 3 which as granulated in and now simply needs to resurface with epithelial tissue. Wound is 1.5x 1cm with pink wound bed, small amount of serosanguinous drainage on old dressing. Will add Opticel ag under the optifoam and change dressing every other day. Educated pt and his significant other in need to off-load the heel at all times to avoid further injury to the wound. They both verbalized understanding of instructions. Orders placed in Epic. Please re-consult if wound does not improve with current treatment.

## 2022-03-04 NOTE — CONSULTS
"    Patient Name:  Pierce Morse  YOB: 1965  MRN:  [id number]  Date of Admission:  3/2/2022  Date of Consult:  3/3/2022  Patient Care Team:  Farhad Mark MD as PCP - General (Internal Medicine)    Inpatient Hospitalist Consult  Consult performed by: Emanuel Zhu MD  Consult ordered by: Jr Eliseo Hewitt MD  Reason for consult: Evaluate status and make recommendations regarding treatment for diabetes        Subjective   History of Present Illness  Mr. Morse is a 57 y.o. male that has been admitted to [facility] following elective CORONARY ARTERY BYPASS GRAFT X5, WITH LEFT INTERNAL MAMMARY HARVEST, MIDLINE STERNOTOMY,  INTRAOP MARIELA, PRP.  He has been admitted to an cardiovascular floor following surgery and we were asked to see and assist with his medical problems, specifically relating to his severe hyperglycemia.  At the time of my visit he complains of expected postoperative chest pain.  He denies any difficulty breathing.  He tolerated dinner but states appetite not quite back to normal.  He reports difficult glycemic control at home prior to admission.  He takes basal and mealtime insulin.  He feels he averages about 10 units of short acting insulin with each meal.  He thinks his last A1c was over 8%.      Past Medical History:   Diagnosis Date   • Alcohol abuse     SOBRIETY SINCE AUG 2021   • CAD (coronary artery disease)    • CP (cerebral palsy) ([facility])    • Depression    • Diabetes mellitus ([facility])    • DVT (deep venous thrombosis) ([facility])     PT STATES \"WAS RULED OUT\" ON RIGHT LEG   • Heel ulcer ([facility])     RIGHT.  STATES IN WOUND CARE WITH HOME HEALTH   • Hyperlipidemia    • Hypertension    • Ischemic cardiomyopathy    • PVD (peripheral vascular disease) ([facility])    • Renal insufficiency    • Stroke ([facility]) 02/2021     Past Surgical History:   Procedure Laterality Date   • CLUB FOOT RELEASE Left     X6   • CORONARY ARTERY BYPASS GRAFT N/A 3/2/2022    Procedure: CORONARY ARTERY " BYPASS GRAFT X5, WITH LEFT INTERNAL MAMMARY HARVEST, MIDLINE STERNOTOMY,  INTRAOP MARIELA, PRP;  Surgeon: Jr Eliseo Hewitt MD;  Location: [facility];  Service: Cardiothoracic;  Laterality: N/A;   • ENDOSCOPY     • EYE MUSCLE SURGERY Right      Family History   Problem Relation Age of Onset   • Malig Hyperthermia Neg Hx      Social History     Tobacco Use   • Smoking status: Former Smoker     Quit date: 3/17/2021     Years since quittin.9   • Smokeless tobacco: Former User   Vaping Use   • Vaping Use: Never used   Substance Use Topics   • Alcohol use: Not Currently     Comment: LAST DRINK 2021   • Drug use: Never     Medications Prior to Admission   Medication Sig Dispense Refill Last Dose   • amLODIPine (NORVASC) 10 MG tablet Take 10 mg by mouth Every Night.   3/1/2022 at 2100   • amLODIPine (NORVASC) 5 MG tablet Take 5 mg by mouth Every Night.   3/1/2022 at 2100   • atorvastatin (LIPITOR) 40 MG tablet Take 40 mg by mouth Every Night.   3/1/2022 at 2100   • carvedilol (COREG) 6.25 MG tablet Take 6.25 mg by mouth 2 (Two) Times a Day With Meals.   3/1/2022 at 2100   • chlorhexidine (PERIDEX) 0.12 % solution Apply 15 mL to the mouth or throat. As directed pre op   3/2/2022 at 0630   • FLUoxetine (PROzac) 20 MG capsule Take 20 mg by mouth Every Night.   3/1/2022 at 2100   • insulin aspart (NovoLOG FlexPen) 100 UNIT/ML solution pen-injector sc pen Inject 10 Units under the skin into the appropriate area as directed 3 (Three) Times a Day With Meals.   3/1/2022 at 1930   • insulin glargine (Lantus) 100 UNIT/ML injection Inject 20 Units under the skin into the appropriate area as directed 2 (Two) Times a Day.   3/1/2022 at 1930   • lisinopril (PRINIVIL,ZESTRIL) 40 MG tablet Take 20 mg by mouth Every Night.   3/1/2022 at 2100   • metFORMIN (GLUCOPHAGE) 500 MG tablet Take 500 mg by mouth 2 (Two) Times a Day With Meals.   3/1/2022 at 2100   • mupirocin (BACTROBAN) 2 % nasal ointment into the nostril(s) as directed  by provider.   3/2/2022 at 0600   • aspirin (aspirin) 81 MG EC tablet Take 81 mg by mouth Daily. PT HOLDING FOR SURGERY   2/27/2022   • BUPIVACAINE 0.5% IN DEXTROSE 50% INJECTION - PODIATRY 12.5 g.      • sulfamethoxazole-trimethoprim (Bactrim DS) 800-160 MG per tablet Take 1 tablet by mouth 2 (Two) Times a Day for 3 days. 6 tablet 0 Unknown at Unknown time     Allergies:  Patient has no known allergies.    Review of Systems   Constitutional: Positive for activity change and appetite change.   HENT: Negative.    Eyes: Negative.    Respiratory: Negative.  Negative for shortness of breath.    Cardiovascular: Positive for chest pain.   Gastrointestinal: Negative.  Negative for nausea.   Endocrine: Negative.    Genitourinary: Negative.    Musculoskeletal: Negative.    Skin: Negative.    Neurological: Negative.    Hematological: Negative.    Psychiatric/Behavioral: Negative.        Objective      Vital Signs  Temp:  [96.08 °F (35.6 °C)-98 °F (36.7 °C)] 97.9 °F (36.6 °C)  Heart Rate:  [71-89] 75  Resp:  [12-18] 14  BP: ()/(48-72) 101/62  Arterial Line BP: ()/(44-54) 122/50  Body mass index is 21.13 kg/m².    Physical Exam  Vitals reviewed.   Constitutional:       Appearance: Normal appearance. He is well-developed.   HENT:      Head: Normocephalic and atraumatic.   Eyes:      General: No scleral icterus.     Conjunctiva/sclera: Conjunctivae normal.   Neck:      Vascular: No JVD.   Cardiovascular:      Rate and Rhythm: Normal rate and regular rhythm.      Heart sounds: No murmur heard.      Pulmonary:      Effort: Pulmonary effort is normal. No respiratory distress.      Breath sounds: Decreased breath sounds present.   Chest:      Comments: Wound dressed  Abdominal:      General: Bowel sounds are normal. There is no distension.      Palpations: Abdomen is soft.      Tenderness: There is no abdominal tenderness.   Musculoskeletal:      Cervical back: Normal range of motion and neck supple.   Skin:     General:  Skin is warm and dry.   Neurological:      Mental Status: He is alert and oriented to person, place, and time.      Cranial Nerves: No cranial nerve deficit.   Psychiatric:         Behavior: Behavior normal.         Results Review:   I reviewed the patient's new clinical results.  I reviewed the patient's new imaging results and agree with the interpretation.  I reviewed the patient's other test results and agree with the interpretation         Assessment/Plan     Active Hospital Problems    Diagnosis  POA   • **Coronary artery disease involving native coronary artery of native heart without angina pectoris [I25.10]  Yes   • Heel ulcer ([facility]) [L97.409]  Unknown   • Coronary artery disease of native heart with stable angina pectoris ([facility]) [I25.118]  Yes   • Cerebral palsy ([facility]) [G80.9]  Yes   • Type 2 diabetes mellitus with diabetic peripheral angiopathy without gangrene, with long-term current use of insulin ([facility]) [E11.51, Z79.4]  Not Applicable   • Ischemic cardiomyopathy [I25.5]  Yes      Resolved Hospital Problems   No resolved problems to display.       Mr. Morse is a 57 y.o. male who is s/p CORONARY ARTERY BYPASS GRAFT X5, WITH LEFT INTERNAL MAMMARY HARVEST, MIDLINE STERNOTOMY,  INTRAOP MARIELA, PRP.    We were asked to see patient postoperatively due to severe hyperglycemia.  Perioperatively he was managed on insulin drip protocol.  He has since been transferred out of [facility] and started on attenuated dose of his home insulin regimen.  He had dinner this evening has been his only meal.  Blood sugar was over 300.  This evening patient has received 15 units of Lantus and 9 units of SSI.  I think okay to continue attenuated home dose of his insulin regimen.  As stated at home he takes 20 of Lantus twice a day.  For now will continue this at 15 units twice daily dosing and schedule his mealtime lispro at 6 units.  Will continue correctional factor as well.  If he is eating well tomorrow consider restarting home dosages.   Will recheck A1c and renal panel in a.m.  If cannot achieve blood glucose less than 180 with this regimen tomorrow consider reinitiating insulin drip protocol.        Thank you very much for asking [facility] to be involved in this patient's care. We will follow along with you.      Emanuel Zhu MD  [facility]  03/03/22  23:23 EST

## 2022-03-04 NOTE — PROGRESS NOTES
"Adult Nutrition  Assessment/PES    Patient Name:  Pierce Morse  YOB: 1965  MRN: [id number]  Admit Date:  3/2/2022    Assessment Date:  3/4/2022    Comments:  Assessed due to indication of pressure injury on right heel.     Pt indicated he's had this ulcerated area for several months after a prolonged hospitalization. Image reviewed; wound care RN has evaluated. Area is 1.5x1 cm with a pink wound bed, used to be a \"big hole.\" Some drainage noted on dressing. Advised to off-load heel and change dressing every other day. Tolerating diet/eating ok. Will add boost glucose to supplement daily. Continue to monitor.      Reason for Assessment     Row Name 03/04/22 1438          Reason for Assessment    Reason For Assessment identified at risk by screening criteria     Diagnosis cardiac disease; diabetes diagnosis/complications; neurologic conditions  CAD, DM 2, cerebral palsy     Identified At Risk by Screening Criteria large or nonhealing wound, burn or pressure injury                Nutrition/Diet History     Row Name 03/04/22 1454          Nutrition/Diet History    Typical Food/Fluid Intake Eating but not back to baseline appetite. Developed this heel ulcer/injury after being hospitalized for prolonged period of time a few months ago.                Anthropometrics     Row Name 03/04/22 1440 03/04/22 1208       Anthropometrics    Height 177.8 cm (70\") 177.8 cm (70\")       Admit Weight    Admit Weight 66.7 kg (147 lb) --       Ideal Body Weight (IBW)    Ideal Body Weight (IBW) (kg) 76.48 76.48       Body Mass Index (BMI)    BMI Assessment BMI 18.5-24.9: normal --               Labs/Tests/Procedures/Meds     Row Name 03/04/22 1440          Labs/Procedures/Meds    Lab Results Reviewed reviewed, pertinent     Lab Results Comments Na 133, Glu 144, Aic 8.6%, Mg 2.8, Phos 5.4, BUN 30, Cr 1.34, WBC 12k            Diagnostic Tests/Procedures    Diagnostic Test/Procedure Reviewed reviewed, pertinent     " "Diagnostic Test/Procedures Comments s/p CABGx7 LIMA/LSVG- POD#2 Jay            Medications    Pertinent Medications Reviewed reviewed, pertinent     Pertinent Medications Comments insulin, protonix, colace,                Physical Findings     Row Name 03/04/22 1449          Physical Findings    Overall Physical Appearance on oxygen therapy     Skin surgical incision; pressure injury  healing full thickness pressure injury to rt heel, Wound is 1.5x 1cm with pink wound bed, small amount of serosanguinous drainage on old dressing noted                Estimated/Assessed Needs     Row Name 03/04/22 1440 03/04/22 1208       Calculation Measurements    Weight Used For Calculations 68.9 kg (152 lb) --    Height 177.8 cm (70\") 177.8 cm (70\")       Estimated/Assessed Needs    Additional Documentation Fluid Requirements (Group); Protein Requirements (Group); KCAL/KG (Group) --       KCAL/KG    KCAL/KG 30 Kcal/Kg (kcal) --    30 Kcal/Kg (kcal) 2068.41 --       Protein Requirements    Weight Used For Protein Calculations 68.9 kg (152 lb) --    Est Protein Requirement Amount (gms/kg) 1.2 gm protein --    Estimated Protein Requirements (gms/day) 82.74 --       Fluid Requirements    Fluid Requirements (mL/day) 2000 --    Estimated Fluid Requirement Method RDA Method --    RDA Method (mL) 2000 --               Nutrition Prescription Ordered     Row Name 03/04/22 1452          Nutrition Prescription PO    Current PO Diet Regular     Fluid Consistency Thin     Common Modifiers Cardiac; Consistent Carbohydrate                Evaluation of Received Nutrient/Fluid Intake     Row Name 03/04/22 1440          PO Evaluation    Number of Days PO Intake Evaluated Insufficient Data                     Problem/Interventions:   Problem 1     Row Name 03/04/22 1456          Nutrition Diagnoses Problem 1    Problem 1 Increased Nutrient Needs     Macronutrient Kcal; Protein     Etiology (related to) Goals of Care; Medical Diagnosis     Skin " Pressure injury; Surgical wound                      Intervention Goal     Row Name 03/04/22 1457          Intervention Goal    General Disease management/therapy; Meet nutritional needs for age/condition     PO Tolerate PO; Establish PO; PO intake (%)     PO Intake % 75 %                Nutrition Intervention     Row Name 03/04/22 1457          Nutrition Intervention    RD/Tech Action Follow Tx progress; Care plan reviewd; Encourage intake; Recommend/ordered     Recommended/Ordered Supplement                Nutrition Prescription     Row Name 03/04/22 1458          Nutrition Prescription PO    PO Prescription Begin/change supplement     Supplement Boost Glucose Control     Supplement Frequency Daily     New PO Prescription Ordered? Yes                Education/Evaluation     Row Name 03/04/22 1459          Education    Education Will Instruct as appropriate            Monitor/Evaluation    Monitor Per protocol; PO intake; Pertinent labs; Weight; Skin status; Symptoms                 Electronically signed by:  Tana Vasquez RD  03/04/22 14:59 EST

## 2022-03-04 NOTE — PROGRESS NOTES
[facility]     LOS: 2 days     Subjective:    CC:No chief complaint on file.    DIET:  Diet Order   Procedures   • Diet Regular; Consistent Carbohydrate, Cardiac   cp appropriate  No soa  No BM  Appetite good    Objective:    Vital Signs:  Temp:  [97.8 °F (36.6 °C)-99.5 °F (37.5 °C)] 97.8 °F (36.6 °C)  Heart Rate:  [75-90] 90  Resp:  [14-15] 15  BP: ()/(48-91) 127/80    SpO2:  [91 %-99 %] 99 %  on  Flow (L/min):  [1-2] 2;   Device (Oxygen Therapy): nasal cannula  Body mass index is 21.81 kg/m².    Physical Exam  HENT:      Head: Normocephalic and atraumatic.   Cardiovascular:      Heart sounds: No murmur heard.  No friction rub.   Pulmonary:      Effort: Pulmonary effort is normal.      Breath sounds: Normal breath sounds.   Abdominal:      General: Bowel sounds are normal. There is no distension.      Palpations: Abdomen is soft.      Tenderness: There is no abdominal tenderness.   Skin:     General: Skin is warm and dry.         Results Review:    Glucose   Date Value Ref Range Status   03/04/2022 216 (H) 65 - 99 mg/dL Final   03/03/2022 114 (H) 65 - 99 mg/dL Final   03/02/2022 123 (H) 65 - 99 mg/dL Final   03/02/2022 190 (H) 65 - 99 mg/dL Final     Results from last 7 days   Lab Units 03/04/22  0443   WBC 10*3/mm3 12.79*   HEMOGLOBIN g/dL 8.7*   HEMATOCRIT % 25.5*   PLATELETS 10*3/mm3 189     Results from last 7 days   Lab Units 03/04/22  0919 03/02/22  1532 02/28/22  0844   SODIUM mmol/L 133*   < > 136   POTASSIUM mmol/L 4.0   < > 4.6   CHLORIDE mmol/L 101   < > 102   CO2 mmol/L 22.8   < > 23.9   BUN mg/dL 30*   < > 20   CREATININE mg/dL 1.34*   < > 0.99   CALCIUM mg/dL 8.7   < > 9.0   BILIRUBIN mg/dL  --   --  0.3   ALK PHOS U/L  --   --  85   ALT (SGPT) U/L  --   --  15   AST (SGOT) U/L  --   --  10   GLUCOSE mg/dL 216*   < > 173*    < > = values in this interval not displayed.     Results from last 7 days   Lab Units 03/03/22  0305 03/02/22  1532 03/02/22  1532   INR  1.18*    < > 1.24*   APTT seconds  --   --  26.8    < > = values in this interval not displayed.     Results from last 7 days   Lab Units 03/03/22  0305   MAGNESIUM mg/dL 2.8*         Cultures:  Urine Culture   Date Value Ref Range Status   02/28/2022 Yeast isolated (A)  Final       I have reviewed daily medications and changes in CPOE    Scheduled meds  aspirin, 81 mg, Oral, Daily  atorvastatin, 40 mg, Oral, Nightly  carvedilol, 3.125 mg, Oral, Q12H  chlorhexidine, 15 mL, Mouth/Throat, Q12H  enoxaparin, 40 mg, Subcutaneous, Daily  guaiFENesin, 1,200 mg, Oral, Q12H  insulin glargine, 15 Units, Subcutaneous, Q12H  insulin lispro, 0-9 Units, Subcutaneous, 4x Daily With Meals & Nightly  insulin lispro, 6 Units, Subcutaneous, TID With Meals  mupirocin, , Each Nare, BID  pantoprazole, 40 mg, Oral, QAM  senna-docusate sodium, 2 tablet, Oral, Nightly        sodium chloride, 30 mL/hr, Last Rate: 15 mL/hr (03/02/22 1632)  sodium chloride, 30 mL/hr      PRN meds  •  acetaminophen **OR** acetaminophen **OR** acetaminophen  •  ALPRAZolam  •  bisacodyl  •  bisacodyl  •  cyclobenzaprine  •  dextrose  •  dextrose  •  glucagon (human recombinant)  •  HYDROcodone-acetaminophen  •  magnesium hydroxide  •  Morphine **AND** naloxone  •  nitroglycerin  •  ondansetron  •  oxyCODONE  •  polyethylene glycol  •  potassium chloride **OR** potassium chloride  •  potassium chloride **OR** potassium chloride  •  potassium chloride  •  potassium chloride  •  potassium chloride  •  sodium chloride  •  sodium chloride        Coronary artery disease involving native coronary artery of native heart without angina pectoris    Ischemic cardiomyopathy    Type 2 diabetes mellitus with diabetic peripheral angiopathy without gangrene, with long-term current use of insulin (HCC)    Cerebral palsy (HCC)    Coronary artery disease of native heart with stable angina pectoris (HCC)    Heel ulcer (HCC)        Assessment/Plan:  Mr. Morse is a 57 y.o. male who is s/p  CORONARY ARTERY BYPASS GRAFT X5, WITH LEFT INTERNAL MAMMARY HARVEST, MIDLINE STERNOTOMY,  INTRAOP MARIELA, PRP.    -lantus 15 bid  -ssi  -lispro 6 tid  -blood sugars reasonaably controlled         Derrell Potter MD  03/04/22  16:26 EST

## 2022-03-04 NOTE — THERAPY TREATMENT NOTE
"Patient Name: Pierce Morse  : 1965    MRN: [id number]                              Today's Date: 3/4/2022       Admit Date: 3/2/2022    Visit Dx:     ICD-10-CM ICD-9-CM   1. S/P CABG (coronary artery bypass graft)  Z95.1 V45.81   2. Coronary artery disease of native heart with stable angina pectoris, unspecified vessel or lesion type ([facility])  I25.118 414.01     413.9     Patient Active Problem List   Diagnosis   • Coronary artery disease involving native coronary artery of native heart without angina pectoris   • Ischemic cardiomyopathy   • Chronic stable angina ([facility])   • Type 2 diabetes mellitus with diabetic peripheral angiopathy without gangrene, with long-term current use of insulin ([facility])   • Cerebral palsy ([facility])   • Coronary artery disease of native heart with stable angina pectoris ([facility])   • Heel ulcer ([facility])     Past Medical History:   Diagnosis Date   • Alcohol abuse     SOBRIETY SINCE AUG 2021   • CAD (coronary artery disease)    • CP (cerebral palsy) ([facility])    • Depression    • Diabetes mellitus ([facility])    • DVT (deep venous thrombosis) ([facility])     PT STATES \"WAS RULED OUT\" ON RIGHT LEG   • Heel ulcer ([facility])     RIGHT.  STATES IN WOUND CARE WITH HOME HEALTH   • Hyperlipidemia    • Hypertension    • Ischemic cardiomyopathy    • PVD (peripheral vascular disease) ([facility])    • Renal insufficiency    • Stroke ([facility]) 2021     Past Surgical History:   Procedure Laterality Date   • CLUB FOOT RELEASE Left     X6   • CORONARY ARTERY BYPASS GRAFT N/A 3/2/2022    Procedure: CORONARY ARTERY BYPASS GRAFT X5, WITH LEFT INTERNAL MAMMARY HARVEST, MIDLINE STERNOTOMY,  INTRAOP MARIELA, PRP;  Surgeon: Jr Eliseo Hewitt MD;  Location: [facility];  Service: Cardiothoracic;  Laterality: N/A;   • ENDOSCOPY     • EYE MUSCLE SURGERY Right       General Information     Row Name 22 1252          Physical Therapy Time and Intention    Document Type therapy note (daily note)  -     Mode of Treatment individual therapy; " physical therapy  -     Row Name 03/04/22 1252          General Information    Patient Profile Reviewed yes  -     Existing Precautions/Restrictions fall; cardiac; sternal  -     Row Name 03/04/22 1252          Cognition    Orientation Status (Cognition) oriented x 3  -     Row Name 03/04/22 1252          Safety Issues, Functional Mobility    Impairments Affecting Function (Mobility) balance; endurance/activity tolerance; strength; pain  -           User Key  (r) = Recorded By, (t) = Taken By, (c) = Cosigned By    Initials Name Provider Type     Juanita Sr Physical Therapist               Mobility     Row Name 03/04/22 1252          Bed Mobility    Bed Mobility supine-sit; sit-supine  -     Supine-Sit [place] (Bed Mobility) not tested  -     Sit-Supine [place] (Bed Mobility) not tested  -     Comment (Bed Mobility) UIC  -     Row Name 03/04/22 1252          Sit-Stand Transfer    Sit-Stand [place] (Transfers) minimum assist (75% patient effort); 2 person assist; verbal cues  -     Assistive Device (Sit-Stand Transfers) other (see comments)  A  -     Row Name 03/04/22 1252          Gait/Stairs (Locomotion)    [place] Level (Gait) minimum assist (75% patient effort); 1 person to manage equipment; 1 person assist  -     Assistive Device (Gait) other (see comments)  A  -     Distance in Feet (Gait) 120ft  -     Deviations/Abnormal Patterns (Gait) stride length decreased; gait speed decreased  -     Bilateral Gait Deviations forward flexed posture  -     Comment (Gait/Stairs) Tolerated gait well, progressed gait distance with min c/o fatigue towards end of ambulation  -           User Key  (r) = Recorded By, (t) = Taken By, (c) = Cosigned By    Initials Name Provider Type     Juanita Sr Physical Therapist               Obj/Interventions     Row Name 03/04/22 1254          Motor Skills    Therapeutic Exercise --  10 reps cardiac rehab protocol  -            User Key  (r) = Recorded By, (t) = Taken By, (c) = Cosigned By    Initials Name Provider Type     Juanita Sr Physical Therapist               Goals/Plan    No documentation.                Clinical Impression     Row Name 03/04/22 1255          Pain Scale: Numbers Pre/Post-Treatment    Pretreatment Pain Rating 2/10  -     Posttreatment Pain Rating 2/10  -     Pain Location chest  -     Pain Intervention(s) Repositioned; Ambulation/increased activity  -     Row Name 03/04/22 1255          Plan of Care Review    Plan of Care Reviewed With patient  -     Progress improving  -     Outcome Summary Pt agreeable to PT this AM and able to progress gait distance. Pt required min A for STS and ambulated 120ft with min A x2 for equipment. Pt tolerated exercises well and demonstrated improved steadiness with gait with no overt LOB. Pt left [facility] with all needs met. Pt will continue to benefit from skilled PT to address functional deficits. PT recommending D/C home with HHPT.  -[facility] 03/04/22 1255          Positioning and Restraints    Pre-Treatment Position sitting in chair/recliner  -     Post Treatment Position chair  -     In Chair reclined; call light within reach; encouraged to call for assist; exit alarm on; with ns  -           User Key  (r) = Recorded By, (t) = Taken By, (c) = Cosigned By    Initials Name Provider Type     Juanita Sr Physical Therapist               Outcome Measures     Row Name 03/04/22 1257          How much help from another person do you currently need...    Turning from your back to your side while in flat bed without using bedrails? 3  -BH     Moving from lying on back to sitting on the side of a flat bed without bedrails? 3  -BH     Moving to and from a bed to a chair (including a wheelchair)? 3  -BH     Standing up from a chair using your arms (e.g., wheelchair, bedside chair)? 3  -BH     Climbing 3-5 steps with a railing? 2  -BH     To walk in  hospital room? 3  -     AM-PAC 6 Clicks Score (PT) 17  -     Row Name 03/04/22 1257          Functional Assessment    Outcome Measure Options AM-PAC 6 Clicks Basic Mobility (PT)  -           User Key  (r) = Recorded By, (t) = Taken By, (c) = Cosigned By    Initials Name Provider Type     Juanita Sr Physical Therapist                             Physical Therapy Education                 Title: PT OT SLP Therapies (Done)     Topic: Physical Therapy (Done)     Point: Mobility training (Done)     Learning Progress Summary           Patient Acceptance, E,TB,D, VU,NR by  at 3/4/2022 1257    Acceptance, E, NR by  at 3/3/2022 1124                   Point: Home exercise program (Done)     Learning Progress Summary           Patient Acceptance, E,TB,D, VU,NR by  at 3/4/2022 1257    Acceptance, E, NR by  at 3/3/2022 1124                   Point: Body mechanics (Done)     Learning Progress Summary           Patient Acceptance, E,TB,D, VU,NR by  at 3/4/2022 1257                   Point: Precautions (Done)     Learning Progress Summary           Patient Acceptance, E,TB,D, VU,NR by  at 3/4/2022 1257                               User Key     Initials Effective Dates Name Provider Type Discipline     06/16/21 -  Kacie Live PT Physical Therapist PT     05/10/21 -  Juanita Sr Physical Therapist PT              PT Recommendation and Plan     Plan of Care Reviewed With: patient  Progress: improving  Outcome Summary: Pt agreeable to PT this AM and able to progress gait distance. Pt required min A for STS and ambulated 120ft with min A x2 for equipment. Pt tolerated exercises well and demonstrated improved steadiness with gait with no overt LOB. Pt left [facility] with all needs met. Pt will continue to benefit from skilled PT to address functional deficits. PT recommending D/C home with HHPT.     Time Calculation:    PT Charges     Row Name 03/04/22 9310             Time Calculation    Start Time  1054  -      Stop Time 1111  -      Time Calculation (min) 17 min  -      PT Received On 03/04/22  -      PT - Next Appointment 03/05/22  -              Time Calculation- PT    Total Timed Code Minutes- PT 17 minute(s)  -              Timed Charges    90099 - PT Therapeutic Exercise Minutes 7  -BH      04107 - Gait Training Minutes  10  -              Total Minutes    Timed Charges Total Minutes 17  -       Total Minutes 17  -BH            User Key  (r) = Recorded By, (t) = Taken By, (c) = Cosigned By    Initials Name Provider Type     Juanita Sr Physical Therapist              Therapy Charges for Today     Code Description Service Date Service Provider Modifiers Qty    77205289400 HC GAIT TRAINING EA 15 MIN 3/4/2022 Juanita Sr GP 1    58282311111 HC PT THER SUPP EA 15 MIN 3/4/2022 Juanita Sr GP 1          PT G-Codes  Outcome Measure Options: AM-PAC 6 Clicks Basic Mobility (PT)  AM-PAC 6 Clicks Score (PT): 17    JUANITA SR  3/4/2022

## 2022-03-05 ENCOUNTER — APPOINTMENT (OUTPATIENT)
Dept: GENERAL RADIOLOGY | Facility: HOSPITAL | Age: 57
End: 2022-03-05

## 2022-03-05 LAB
ANION GAP SERPL CALCULATED.3IONS-SCNC: 9 MMOL/L (ref 5–15)
BUN SERPL-MCNC: 33 MG/DL (ref 6–20)
BUN/CREAT SERPL: 26.8 (ref 7–25)
CALCIUM SPEC-SCNC: 8.2 MG/DL (ref 8.6–10.5)
CHLORIDE SERPL-SCNC: 102 MMOL/L (ref 98–107)
CO2 SERPL-SCNC: 23 MMOL/L (ref 22–29)
CREAT SERPL-MCNC: 1.23 MG/DL (ref 0.76–1.27)
DEPRECATED RDW RBC AUTO: 47.4 FL (ref 37–54)
EGFRCR SERPLBLD CKD-EPI 2021: 69.3 ML/MIN/1.73
ERYTHROCYTE [DISTWIDTH] IN BLOOD BY AUTOMATED COUNT: 14 % (ref 12.3–15.4)
GLUCOSE BLDC GLUCOMTR-MCNC: 104 MG/DL (ref 70–130)
GLUCOSE BLDC GLUCOMTR-MCNC: 119 MG/DL (ref 70–130)
GLUCOSE BLDC GLUCOMTR-MCNC: 397 MG/DL (ref 70–130)
GLUCOSE BLDC GLUCOMTR-MCNC: 91 MG/DL (ref 70–130)
GLUCOSE SERPL-MCNC: 110 MG/DL (ref 65–99)
HCT VFR BLD AUTO: 25.5 % (ref 37.5–51)
HGB BLD-MCNC: 8.4 G/DL (ref 13–17.7)
MCH RBC QN AUTO: 30.5 PG (ref 26.6–33)
MCHC RBC AUTO-ENTMCNC: 32.9 G/DL (ref 31.5–35.7)
MCV RBC AUTO: 92.7 FL (ref 79–97)
PLATELET # BLD AUTO: 172 10*3/MM3 (ref 140–450)
PMV BLD AUTO: 9.7 FL (ref 6–12)
POTASSIUM SERPL-SCNC: 4.1 MMOL/L (ref 3.5–5.2)
RBC # BLD AUTO: 2.75 10*6/MM3 (ref 4.14–5.8)
SODIUM SERPL-SCNC: 134 MMOL/L (ref 136–145)
WBC NRBC COR # BLD: 10.27 10*3/MM3 (ref 3.4–10.8)

## 2022-03-05 PROCEDURE — 25010000002 ENOXAPARIN PER 10 MG: Performed by: NURSE PRACTITIONER

## 2022-03-05 PROCEDURE — 99024 POSTOP FOLLOW-UP VISIT: CPT | Performed by: THORACIC SURGERY (CARDIOTHORACIC VASCULAR SURGERY)

## 2022-03-05 PROCEDURE — 94799 UNLISTED PULMONARY SVC/PX: CPT

## 2022-03-05 PROCEDURE — 99222 1ST HOSP IP/OBS MODERATE 55: CPT | Performed by: INTERNAL MEDICINE

## 2022-03-05 PROCEDURE — 97110 THERAPEUTIC EXERCISES: CPT

## 2022-03-05 PROCEDURE — 85027 COMPLETE CBC AUTOMATED: CPT | Performed by: NURSE PRACTITIONER

## 2022-03-05 PROCEDURE — 94760 N-INVAS EAR/PLS OXIMETRY 1: CPT

## 2022-03-05 PROCEDURE — 63710000001 INSULIN LISPRO (HUMAN) PER 5 UNITS: Performed by: HOSPITALIST

## 2022-03-05 PROCEDURE — 63710000001 INSULIN LISPRO (HUMAN) PER 5 UNITS: Performed by: NURSE PRACTITIONER

## 2022-03-05 PROCEDURE — 80048 BASIC METABOLIC PNL TOTAL CA: CPT | Performed by: NURSE PRACTITIONER

## 2022-03-05 PROCEDURE — 71046 X-RAY EXAM CHEST 2 VIEWS: CPT

## 2022-03-05 PROCEDURE — 71045 X-RAY EXAM CHEST 1 VIEW: CPT

## 2022-03-05 PROCEDURE — 94761 N-INVAS EAR/PLS OXIMETRY MLT: CPT

## 2022-03-05 PROCEDURE — 82962 GLUCOSE BLOOD TEST: CPT

## 2022-03-05 RX ORDER — INSULIN LISPRO 100 [IU]/ML
8 INJECTION, SOLUTION INTRAVENOUS; SUBCUTANEOUS
Status: DISCONTINUED | OUTPATIENT
Start: 2022-03-05 | End: 2022-03-07 | Stop reason: HOSPADM

## 2022-03-05 RX ORDER — POTASSIUM CHLORIDE 750 MG/1
20 TABLET, FILM COATED, EXTENDED RELEASE ORAL DAILY
Status: DISCONTINUED | OUTPATIENT
Start: 2022-03-05 | End: 2022-03-07 | Stop reason: HOSPADM

## 2022-03-05 RX ORDER — CALCIUM GLUCONATE 20 MG/ML
1 INJECTION, SOLUTION INTRAVENOUS ONCE
Status: DISCONTINUED | OUTPATIENT
Start: 2022-03-05 | End: 2022-03-05

## 2022-03-05 RX ORDER — METOPROLOL SUCCINATE 25 MG/1
25 TABLET, EXTENDED RELEASE ORAL NIGHTLY
Status: DISCONTINUED | OUTPATIENT
Start: 2022-03-05 | End: 2022-03-07 | Stop reason: HOSPADM

## 2022-03-05 RX ORDER — POLYETHYLENE GLYCOL 3350 17 G/17G
17 POWDER, FOR SOLUTION ORAL DAILY
Status: DISCONTINUED | OUTPATIENT
Start: 2022-03-05 | End: 2022-03-07 | Stop reason: HOSPADM

## 2022-03-05 RX ORDER — FUROSEMIDE 40 MG/1
40 TABLET ORAL DAILY
Status: DISCONTINUED | OUTPATIENT
Start: 2022-03-05 | End: 2022-03-07 | Stop reason: HOSPADM

## 2022-03-05 RX ADMIN — CHLORHEXIDINE GLUCONATE 15 ML: 1.2 RINSE ORAL at 22:04

## 2022-03-05 RX ADMIN — MUPIROCIN: 20 OINTMENT TOPICAL at 10:35

## 2022-03-05 RX ADMIN — HYDROCODONE BITARTRATE AND ACETAMINOPHEN 2 TABLET: 5; 325 TABLET ORAL at 05:39

## 2022-03-05 RX ADMIN — CYCLOBENZAPRINE 10 MG: 10 TABLET, FILM COATED ORAL at 22:05

## 2022-03-05 RX ADMIN — HYDROCODONE BITARTRATE AND ACETAMINOPHEN 2 TABLET: 5; 325 TABLET ORAL at 00:26

## 2022-03-05 RX ADMIN — INSULIN LISPRO 8 UNITS: 100 INJECTION, SOLUTION INTRAVENOUS; SUBCUTANEOUS at 16:43

## 2022-03-05 RX ADMIN — POTASSIUM CHLORIDE 20 MEQ: 10 TABLET, EXTENDED RELEASE ORAL at 10:28

## 2022-03-05 RX ADMIN — POLYETHYLENE GLYCOL 3350 17 G: 17 POWDER, FOR SOLUTION ORAL at 12:28

## 2022-03-05 RX ADMIN — INSULIN GLARGINE-YFGN 20 UNITS: 100 INJECTION, SOLUTION SUBCUTANEOUS at 22:06

## 2022-03-05 RX ADMIN — DOCUSATE SODIUM 50MG AND SENNOSIDES 8.6MG 2 TABLET: 8.6; 5 TABLET, FILM COATED ORAL at 22:05

## 2022-03-05 RX ADMIN — INSULIN LISPRO 6 UNITS: 100 INJECTION, SOLUTION INTRAVENOUS; SUBCUTANEOUS at 10:28

## 2022-03-05 RX ADMIN — CHLORHEXIDINE GLUCONATE 15 ML: 1.2 RINSE ORAL at 10:35

## 2022-03-05 RX ADMIN — PANTOPRAZOLE SODIUM 40 MG: 40 TABLET, DELAYED RELEASE ORAL at 05:39

## 2022-03-05 RX ADMIN — ASPIRIN 81 MG: 81 TABLET, COATED ORAL at 10:28

## 2022-03-05 RX ADMIN — INSULIN GLARGINE-YFGN 15 UNITS: 100 INJECTION, SOLUTION SUBCUTANEOUS at 10:32

## 2022-03-05 RX ADMIN — FUROSEMIDE 40 MG: 40 TABLET ORAL at 10:28

## 2022-03-05 RX ADMIN — INSULIN LISPRO 6 UNITS: 100 INJECTION, SOLUTION INTRAVENOUS; SUBCUTANEOUS at 12:27

## 2022-03-05 RX ADMIN — GUAIFENESIN 1200 MG: 600 TABLET, EXTENDED RELEASE ORAL at 22:04

## 2022-03-05 RX ADMIN — GUAIFENESIN 1200 MG: 600 TABLET, EXTENDED RELEASE ORAL at 10:28

## 2022-03-05 RX ADMIN — HYDROCODONE BITARTRATE AND ACETAMINOPHEN 2 TABLET: 5; 325 TABLET ORAL at 12:28

## 2022-03-05 RX ADMIN — ATORVASTATIN CALCIUM 40 MG: 20 TABLET, FILM COATED ORAL at 22:05

## 2022-03-05 RX ADMIN — METOPROLOL SUCCINATE 25 MG: 25 TABLET, EXTENDED RELEASE ORAL at 22:05

## 2022-03-05 RX ADMIN — HYDROCODONE BITARTRATE AND ACETAMINOPHEN 2 TABLET: 5; 325 TABLET ORAL at 18:21

## 2022-03-05 RX ADMIN — ENOXAPARIN SODIUM 40 MG: 100 INJECTION SUBCUTANEOUS at 16:38

## 2022-03-05 RX ADMIN — MUPIROCIN 1 APPLICATION: 20 OINTMENT TOPICAL at 22:04

## 2022-03-05 RX ADMIN — INSULIN LISPRO 9 UNITS: 100 INJECTION, SOLUTION INTRAVENOUS; SUBCUTANEOUS at 12:22

## 2022-03-05 NOTE — PLAN OF CARE
Goal Outcome Evaluation:      VSS today. All tubes and wires discontinued today, tolerated well. Pain has been controlled well today as well. WCTM.

## 2022-03-05 NOTE — THERAPY TREATMENT NOTE
"Patient Name: Pierce Morse  : 1965    MRN: [id number]                              Today's Date: 3/5/2022       Admit Date: 3/2/2022    Visit Dx:     ICD-10-CM ICD-9-CM   1. S/P CABG (coronary artery bypass graft)  Z95.1 V45.81   2. Coronary artery disease of native heart with stable angina pectoris, unspecified vessel or lesion type ([facility])  I25.118 414.01     413.9     Patient Active Problem List   Diagnosis   • Coronary artery disease involving native coronary artery of native heart without angina pectoris   • Ischemic cardiomyopathy   • Chronic stable angina ([facility])   • Type 2 diabetes mellitus with diabetic peripheral angiopathy without gangrene, with long-term current use of insulin ([facility])   • Cerebral palsy ([facility])   • Coronary artery disease of native heart with stable angina pectoris ([facility])   • Heel ulcer ([facility])     Past Medical History:   Diagnosis Date   • Alcohol abuse     SOBRIETY SINCE AUG 2021   • CAD (coronary artery disease)    • CP (cerebral palsy) ([facility])    • Depression    • Diabetes mellitus ([facility])    • DVT (deep venous thrombosis) ([facility])     PT STATES \"WAS RULED OUT\" ON RIGHT LEG   • Heel ulcer ([facility])     RIGHT.  STATES IN WOUND CARE WITH HOME HEALTH   • Hyperlipidemia    • Hypertension    • Ischemic cardiomyopathy    • PVD (peripheral vascular disease) ([facility])    • Renal insufficiency    • Stroke ([facility]) 2021     Past Surgical History:   Procedure Laterality Date   • CLUB FOOT RELEASE Left     X6   • CORONARY ARTERY BYPASS GRAFT N/A 3/2/2022    Procedure: CORONARY ARTERY BYPASS GRAFT X5, WITH LEFT INTERNAL MAMMARY HARVEST, MIDLINE STERNOTOMY,  INTRAOP MARIELA, PRP;  Surgeon: Jr Eliseo Hewitt MD;  Location: [facility];  Service: Cardiothoracic;  Laterality: N/A;   • ENDOSCOPY     • EYE MUSCLE SURGERY Right       General Information     Row Name 22 1116          Physical Therapy Time and Intention    Document Type therapy note (daily note)  -RH     Mode of Treatment physical " therapy;individual therapy  -     Row Name 03/05/22 1116          General Information    Existing Precautions/Restrictions fall;cardiac;sternal  -     Barriers to Rehab medically complex;previous functional deficit  -     Row Name 03/05/22 1116          Cognition    Orientation Status (Cognition) oriented x 3  -     Row Name 03/05/22 1116          Safety Issues, Functional Mobility    Impairments Affecting Function (Mobility) balance;endurance/activity tolerance;strength;pain  -           User Key  (r) = Recorded By, (t) = Taken By, (c) = Cosigned By    Initials Name Provider Type     Richard Killian, LUI Physical Therapy Assistant               Mobility     Row Name 03/05/22 1116          Bed Mobility    Bed Mobility scooting/bridging  -     Scooting/Bridging Person (Bed Mobility) moderate assist (50% patient effort);verbal cues  -     Supine-Sit Person (Bed Mobility) moderate assist (50% patient effort);verbal cues  -     Sit-Supine Person (Bed Mobility) not tested  -     Row Name 03/05/22 1116          Bed-Chair Transfer    Bed-Chair Person (Transfers) minimum assist (75% patient effort);verbal cues  -     Row Name 03/05/22 1116          Sit-Stand Transfer    Sit-Stand Person (Transfers) minimum assist (75% patient effort);verbal cues  -     Row Name 03/05/22 1116          Gait/Stairs (Locomotion)    Distance in Feet (Gait) 35' and 90'  -     Deviations/Abnormal Patterns (Gait) stride length decreased;gait speed decreased;claus decreased  -     Bilateral Gait Deviations forward flexed posture;weight shift ability decreased;heel strike decreased  -           User Key  (r) = Recorded By, (t) = Taken By, (c) = Cosigned By    Initials Name Provider Type     Richard Killian, LUI Physical Therapy Assistant               Obj/Interventions     Row Name 03/05/22 1117          Balance    Balance Assessment sitting static balance;standing static balance   -RH     Static Sitting Balance contact guard;verbal cues  -RH     Position, Sitting Balance supported  -RH     Static Standing Balance contact guard;verbal cues  -RH     Position/Device Used, Standing Balance supported  -RH           User Key  (r) = Recorded By, (t) = Taken By, (c) = Cosigned By    Initials Name Provider Type    Richard Mathis PTA Physical Therapy Assistant               Goals/Plan    No documentation.                Clinical Impression     Row Name 03/05/22 1118          Pain    Pretreatment Pain Rating 4/10  -RH     Posttreatment Pain Rating 4/10  -RH     Pain Location incisional  -RH     Pain Location - chest  -RH     Row Name 03/05/22 1118          Vital Signs    O2 Delivery Pre Treatment room air  -RH     Row Name 03/05/22 1118          Positioning and Restraints    Pre-Treatment Position in bed  -RH     Post Treatment Position chair  -RH     In Chair notified nsg;reclined;call light within reach;encouraged to call for assist  -RH           User Key  (r) = Recorded By, (t) = Taken By, (c) = Cosigned By    Initials Name Provider Type    Richard Mathis PTA Physical Therapy Assistant               Outcome Measures     Row Name 03/05/22 1119          How much help from another person do you currently need...    Turning from your back to your side while in flat bed without using bedrails? 2  -RH     Moving from lying on back to sitting on the side of a flat bed without bedrails? 2  -RH     Moving to and from a bed to a chair (including a wheelchair)? 3  -RH     Standing up from a chair using your arms (e.g., wheelchair, bedside chair)? 3  -RH     Climbing 3-5 steps with a railing? 2  -RH     To walk in hospital room? 3  -RH     AM-PAC 6 Clicks Score (PT) 15  -RH           User Key  (r) = Recorded By, (t) = Taken By, (c) = Cosigned By    Initials Name Provider Type    Richard Mathis PTA Physical Therapy Assistant                             Physical Therapy Education                  Title: PT OT SLP Therapies (Done)     Topic: Physical Therapy (Done)     Point: Mobility training (Done)     Learning Progress Summary           Patient Acceptance, E,TB,D, VU,NR by  at 3/4/2022 1257    Acceptance, E, NR by  at 3/3/2022 1124                   Point: Home exercise program (Done)     Learning Progress Summary           Patient Acceptance, E,TB,D, VU,NR by  at 3/4/2022 1257    Acceptance, E, NR by EM at 3/3/2022 1124                   Point: Body mechanics (Done)     Learning Progress Summary           Patient Acceptance, E,TB,D, VU,NR by  at 3/4/2022 1257                   Point: Precautions (Done)     Learning Progress Summary           Patient Acceptance, E,TB,D, VU,NR by  at 3/4/2022 1257                               User Key     Initials Effective Dates Name Provider Type Discipline     06/16/21 -  Kacie Live, PT Physical Therapist PT     05/10/21 -  Juanita Sr Physical Therapist PT              PT Recommendation and Plan           Time Calculation:    PT Charges     Row Name 03/05/22 1120             Time Calculation    Start Time 1000  -      Stop Time 1027  -RH      Time Calculation (min) 27 min  -      PT Received On 03/05/22  -      PT - Next Appointment 03/06/22  -            User Key  (r) = Recorded By, (t) = Taken By, (c) = Cosigned By    Initials Name Provider Type     Richard Killian PTA Physical Therapy Assistant              Therapy Charges for Today     Code Description Service Date Service Provider Modifiers Qty    93668853838 HC PT THER PROC EA 15 MIN 3/5/2022 Richard Killian PTA GP 2          PT G-Codes  Outcome Measure Options: AM-PAC 6 Clicks Basic Mobility (PT)  AM-PAC 6 Clicks Score (PT): 15    Richard Killian PTA  3/5/2022

## 2022-03-05 NOTE — CONSULTS
Patient Name: Pierce Morse  :1965  57 y.o.    Date of Admission: 3/2/2022  Date of Consultation:  22  Encounter Provider: Penelope Danielle MD  Place of Service: [facility] CARDIOLOGY  Referring Provider: Jr Eliseo Hewitt MD  Patient Care Team:  Farhad Mark MD as PCP - General (Internal Medicine)      Chief complaint: severe CAD    History of Present Illness:      This is a 57-year-old male with past medical history of hypertension, hyperlipidemia, diabetes mellitus stroke with peripheral vision disturbance, ischemic cardiomyopathy-ejection fraction 30%, cerebral palsy, nonhealing ulcer right foot and CAD.  He has a history of alcohol use and quit 2021 and history of tobacco use, quit in .  He was a 34-pack-year smoker prior to that.  He has had COVID and had severe malnutrition with that.  Because of the malnutrition, even though he had CAD, CABG was deferred because he was too unhealthy to go under surgery.  Carotid duplex done prior to surgery showed mild bilateral carotid artery stenosis.  He had normal arterial ABIs bilaterally done 3/3/2022.    He was into the hospital as an outpatient, stable after improved with weight increase in exercise tolerance and he had CABG x7 done 3/2/2022 receiving LIMA to mid LAD then distal LAD, SVG RCA then PDA, SVG to D1, SVG to OM1 and SVG to OM 2.  Intraoperative MARIELA showed ejection fraction of 50%.    Vascular surgery is seeing him for the right heel ulcer.    Vitals this morning are stable, he is mildly anemic but labs are otherwise stable.  ECG done yesterday showed sinus rhythm, stable ECG.  Chest x-ray done this morning shows small bilateral pleural effusions.  His breathing is stable unless he moves about.  He has no chest pain or pressure.  Does not feels heart racing or skipping.  No dizziness or nausea.  Chest tubes are in place as well as pacing wires.  His underlying rhythm is sinus.      Past  "Medical History:   Diagnosis Date   • Alcohol abuse     SOBRIETY SINCE AUG 2021   • CAD (coronary artery disease)    • CP (cerebral palsy) ([facility])    • Depression    • Diabetes mellitus (HCC)    • DVT (deep venous thrombosis) ([facility])     PT STATES \"WAS RULED OUT\" ON RIGHT LEG   • Heel ulcer ([facility])     RIGHT.  STATES IN WOUND CARE WITH HOME HEALTH   • Hyperlipidemia    • Hypertension    • Ischemic cardiomyopathy    • PVD (peripheral vascular disease) ([facility])    • Renal insufficiency    • Stroke ([facility]) 02/2021       Past Surgical History:   Procedure Laterality Date   • CLUB FOOT RELEASE Left     X6   • CORONARY ARTERY BYPASS GRAFT N/A 3/2/2022    Procedure: CORONARY ARTERY BYPASS GRAFT X5, WITH LEFT INTERNAL MAMMARY HARVEST, MIDLINE STERNOTOMY,  INTRAOP MARIELA, PRP;  Surgeon: Jr Eliseo Hewitt MD;  Location: [facility];  Service: Cardiothoracic;  Laterality: N/A;   • ENDOSCOPY     • EYE MUSCLE SURGERY Right          Prior to Admission medications    Medication Sig Start Date End Date Taking? Authorizing Provider   amLODIPine (NORVASC) 10 MG tablet Take 10 mg by mouth Every Night.   Yes Jose Grigsby MD   amLODIPine (NORVASC) 5 MG tablet Take 5 mg by mouth Every Night.   Yes Jose Grigsby MD   atorvastatin (LIPITOR) 40 MG tablet Take 40 mg by mouth Every Night. 11/5/21  Yes Jose Grigsby MD   carvedilol (COREG) 6.25 MG tablet Take 6.25 mg by mouth 2 (Two) Times a Day With Meals. 2/6/22  Yes Jose Grigsby MD   chlorhexidine (PERIDEX) 0.12 % solution Apply 15 mL to the mouth or throat. As directed pre op   Yes Jose Grigsby MD   FLUoxetine (PROzac) 20 MG capsule Take 20 mg by mouth Every Night.   Yes Jose Grigsby MD   insulin aspart (NovoLOG FlexPen) 100 UNIT/ML solution pen-injector sc pen Inject 10 Units under the skin into the appropriate area as directed 3 (Three) Times a Day With Meals.   Yes Jose Grigsby MD   insulin glargine (Lantus) 100 UNIT/ML injection Inject " 20 Units under the skin into the appropriate area as directed 2 (Two) Times a Day.   Yes Jose Grigsby MD   lisinopril (PRINIVIL,ZESTRIL) 40 MG tablet Take 20 mg by mouth Every Night. 10/12/21  Yes Jose Grigsby MD   metFORMIN (GLUCOPHAGE) 500 MG tablet Take 500 mg by mouth 2 (Two) Times a Day With Meals.   Yes Jose Grigsby MD   mupirocin (BACTROBAN) 2 % nasal ointment into the nostril(s) as directed by provider.   Yes Jose Grigsby MD   aspirin (aspirin) 81 MG EC tablet Take 81 mg by mouth Daily. PT HOLDING FOR SURGERY 10/20/21   Jose Grigsby MD   BUPIVACAINE 0.5% IN DEXTROSE 50% INJECTION - PODIATRY 12.5 g. 10/20/21   Jose Grigsby MD       No Known Allergies    Social History     Socioeconomic History   • Marital status:    Tobacco Use   • Smoking status: Former Smoker     Quit date: 3/17/2021     Years since quittin.9   • Smokeless tobacco: Former User   Vaping Use   • Vaping Use: Never used   Substance and Sexual Activity   • Alcohol use: Not Currently     Comment: LAST DRINK 2021   • Drug use: Never   • Sexual activity: Defer       Family History   Problem Relation Age of Onset   • Malig Hyperthermia Neg Hx        REVIEW OF SYSTEMS:   All systems reviewed.  Pertinent positives identified in HPI.  All other systems are negative.      Objective:     Vitals:    22 2335 22 0325 22 0545 22 0743   BP: 103/65 104/66  107/54   BP Location: Left arm Right arm  Left arm   Patient Position: Lying Lying  Sitting   Pulse: 85 80  85   Resp: 18 18  16   Temp: 98.8 °F (37.1 °C) 97.9 °F (36.6 °C)  97.4 °F (36.3 °C)   TempSrc: Oral Oral  Oral   SpO2:  92%  99%   Weight:   69 kg (152 lb 1.6 oz)    Height:         Body mass index is 21.82 kg/m².    General Appearance:    Alert, cooperative, in no acute distress   Head:    Normocephalic, without obvious abnormality, atraumatic   Eyes:            Lids and lashes normal, conjunctivae and  sclerae normal, no icterus, no pallor, corneas clear   Ears:    Ears appear intact with no abnormalities noted   Throat:   No oral lesions, no thrush, oral mucosa moist   Neck:   No adenopathy, supple, trachea midline, no thyromegaly, no carotid bruit, no JVD   Back:     No kyphosis present, no scoliosis present, no skin lesions, erythema or scars, no tenderness to palpation, range of motion normal   Lungs:     Clear to auscultation but decreased at bases, respirations regular, even and unlabored    Heart:    Regular rhythm and normal rate, normal S1 and S2, no murmur, no gallop, no rub, no click   Chest Wall:    No abnormalities observed   Abdomen:     Normal bowel sounds, no masses, no organomegaly, soft, nontender, nondistended, no guarding, no rebound  tenderness   Extremities:   Moves all extremities well, 1+ lower extremity edema, no cyanosis, no redness   Pulses:   Pulses palpable and equal bilaterally. Normal radial, carotid, femoral, dorsalis pedis and posterior tibial pulses bilaterally. Normal abdominal aorta   Skin:  Psychiatric:   No bleeding, bruising or rash, ulcer right heel    Alert and oriented x 3, normal mood and affect   Lab Review:     Results from last 7 days   Lab Units 03/05/22  0320 03/02/22  1532 02/28/22  0844   SODIUM mmol/L 134*   < > 136   POTASSIUM mmol/L 4.1   < > 4.6   CHLORIDE mmol/L 102   < > 102   CO2 mmol/L 23.0   < > 23.9   BUN mg/dL 33*   < > 20   CREATININE mg/dL 1.23   < > 0.99   CALCIUM mg/dL 8.2*   < > 9.0   BILIRUBIN mg/dL  --   --  0.3   ALK PHOS U/L  --   --  85   ALT (SGPT) U/L  --   --  15   AST (SGOT) U/L  --   --  10   GLUCOSE mg/dL 110*   < > 173*    < > = values in this interval not displayed.         Results from last 7 days   Lab Units 03/05/22  0320   WBC 10*3/mm3 10.27   HEMOGLOBIN g/dL 8.4*   HEMATOCRIT % 25.5*   PLATELETS 10*3/mm3 172     Results from last 7 days   Lab Units 03/03/22  0305 03/02/22  1532 02/28/22  0844   INR  1.18* 1.24* 0.97   APTT seconds   --  26.8 27.4     Results from last 7 days   Lab Units 03/03/22  0305   MAGNESIUM mg/dL 2.8*     Results from last 7 days   Lab Units 02/28/22  0844   CHOLESTEROL mg/dL 128   TRIGLYCERIDES mg/dL 86   HDL CHOL mg/dL 39*   LDL CHOL mg/dL 72             EKG      Previous EKG      I personally viewed and interpreted the patient's EKG/Telemetry data.        Assessment and Plan:       1. CAD, s/p CABGx7 done 3/2/22.  Overall doing well.  2. Ischemic CMP - on metoprolol tartrate, will switch to succinate given his mild cardiomyopathy.  3. DM  4. Hypertension, controlled and low.    5. Hyperlipidemia  6. Anemia, expected, stable.     Underlying sinus rhythm.  Still some mild lower extremity edema.  On oral daily Lasix, which he needs.  Overall looks good.  Will follow.    Penelope Danielle MD  03/05/22  08:47 EST

## 2022-03-05 NOTE — PROGRESS NOTES
[facility]     LOS: 3 days     Subjective:    CC:No chief complaint on file.    DIET:  Diet Order   Procedures   • Diet Regular; Consistent Carbohydrate, Cardiac   cp appropriate  No soa  No BM  Appetite good    Objective:    Vital Signs:  Temp:  [97.4 °F (36.3 °C)-98.8 °F (37.1 °C)] 97.7 °F (36.5 °C)  Heart Rate:  [80-90] 90  Resp:  [16-18] 16  BP: (103-144)/(54-86) 144/86    SpO2:  [92 %-99 %] 97 %  on  Flow (L/min):  [2] 2;   Device (Oxygen Therapy): room air  Body mass index is 21.82 kg/m².    Physical Exam  HENT:      Head: Normocephalic and atraumatic.   Cardiovascular:      Heart sounds: No murmur heard.    No friction rub.   Pulmonary:      Effort: Pulmonary effort is normal.      Breath sounds: Normal breath sounds.   Abdominal:      General: Bowel sounds are normal. There is no distension.      Palpations: Abdomen is soft.      Tenderness: There is no abdominal tenderness.   Skin:     General: Skin is warm and dry.         Results Review:    Glucose   Date Value Ref Range Status   03/05/2022 110 (H) 65 - 99 mg/dL Final   03/04/2022 216 (H) 65 - 99 mg/dL Final   03/03/2022 114 (H) 65 - 99 mg/dL Final   03/02/2022 123 (H) 65 - 99 mg/dL Final     Results from last 7 days   Lab Units 03/05/22  0320   WBC 10*3/mm3 10.27   HEMOGLOBIN g/dL 8.4*   HEMATOCRIT % 25.5*   PLATELETS 10*3/mm3 172     Results from last 7 days   Lab Units 03/05/22  0320 03/02/22  1532 02/28/22  0844   SODIUM mmol/L 134*   < > 136   POTASSIUM mmol/L 4.1   < > 4.6   CHLORIDE mmol/L 102   < > 102   CO2 mmol/L 23.0   < > 23.9   BUN mg/dL 33*   < > 20   CREATININE mg/dL 1.23   < > 0.99   CALCIUM mg/dL 8.2*   < > 9.0   BILIRUBIN mg/dL  --   --  0.3   ALK PHOS U/L  --   --  85   ALT (SGPT) U/L  --   --  15   AST (SGOT) U/L  --   --  10   GLUCOSE mg/dL 110*   < > 173*    < > = values in this interval not displayed.     Results from last 7 days   Lab Units 03/03/22  0305 03/02/22  1532   INR  1.18* 1.24*   APTT  seconds  --  26.8     Results from last 7 days   Lab Units 03/03/22  0305   MAGNESIUM mg/dL 2.8*         Cultures:  Urine Culture   Date Value Ref Range Status   02/28/2022 Yeast isolated (A)  Final       I have reviewed daily medications and changes in CPOE    Scheduled meds  aspirin, 81 mg, Oral, Daily  atorvastatin, 40 mg, Oral, Nightly  chlorhexidine, 15 mL, Mouth/Throat, Q12H  enoxaparin, 40 mg, Subcutaneous, Daily  furosemide, 40 mg, Oral, Daily  guaiFENesin, 1,200 mg, Oral, Q12H  insulin glargine, 20 Units, Subcutaneous, Q12H  insulin lispro, 0-9 Units, Subcutaneous, 4x Daily With Meals & Nightly  insulin lispro, 8 Units, Subcutaneous, TID With Meals  metoprolol succinate XL, 25 mg, Oral, Nightly  mupirocin, , Each Nare, BID  pantoprazole, 40 mg, Oral, QAM  polyethylene glycol, 17 g, Oral, Daily  potassium chloride, 20 mEq, Oral, Daily  senna-docusate sodium, 2 tablet, Oral, Nightly        sodium chloride, 30 mL/hr, Last Rate: 15 mL/hr (03/02/22 1632)  sodium chloride, 30 mL/hr      PRN meds  •  acetaminophen **OR** acetaminophen **OR** acetaminophen  •  ALPRAZolam  •  bisacodyl  •  bisacodyl  •  cyclobenzaprine  •  dextrose  •  dextrose  •  glucagon (human recombinant)  •  HYDROcodone-acetaminophen  •  magnesium hydroxide  •  Morphine **AND** naloxone  •  nitroglycerin  •  ondansetron  •  oxyCODONE  •  polyethylene glycol  •  potassium chloride **OR** potassium chloride  •  potassium chloride **OR** potassium chloride  •  potassium chloride  •  potassium chloride  •  potassium chloride  •  sodium chloride  •  sodium chloride        Coronary artery disease involving native coronary artery of native heart without angina pectoris    Ischemic cardiomyopathy    Type 2 diabetes mellitus with diabetic peripheral angiopathy without gangrene, with long-term current use of insulin (HCC)    Cerebral palsy (HCC)    Coronary artery disease of native heart with stable angina pectoris (HCC)    Heel ulcer  ([facility])        Assessment/Plan:  Mr. Morse is a 57 y.o. male who is s/p CORONARY ARTERY BYPASS GRAFT X5, WITH LEFT INTERNAL MAMMARY HARVEST, MIDLINE STERNOTOMY,  INTRAOP MARIELA, PRP.    -lantus increased to 20  -ssi  -lispro 8 tid  -blood sugars uncontrolled,, increase         Derrell Potter MD  03/05/22  15:41 EST

## 2022-03-05 NOTE — PROGRESS NOTES
" LOS: 3 days   Patient Care Team:  Farhad Mark MD as PCP - General (Internal Medicine)    Chief Complaint: post op    Subjective:  Symptoms:  No shortness of breath or cough.    Diet:  Adequate intake.  No nausea or vomiting.    Activity level: Impaired due to weakness.    Pain:  He complains of pain that is mild.  Pain is well controlled.      Vital Signs  Temp:  [97.4 °F (36.3 °C)-98.8 °F (37.1 °C)] 97.4 °F (36.3 °C)  Heart Rate:  [80-90] 85  Resp:  [15-18] 16  BP: (103-127)/(54-80) 107/54  Body mass index is 21.82 kg/m².    Intake/Output Summary (Last 24 hours) at 3/5/2022 1029  Last data filed at 3/5/2022 0743  Gross per 24 hour   Intake 650 ml   Output 1730 ml   Net -1080 ml     I/O this shift:  In: 480 [P.O.:480]  Out: -     Chest tube drainage last 8 hours: 140        03/03/22  0600 03/04/22  0617 03/05/22  0545   Weight: 66.8 kg (147 lb 4.3 oz) 68.9 kg (152 lb) 69 kg (152 lb 1.6 oz)         Objective:  General Appearance:  Comfortable and in no acute distress.    Vital signs: (most recent): Blood pressure 107/54, pulse 85, temperature 97.4 °F (36.3 °C), temperature source Oral, resp. rate 16, height 177.8 cm (70\"), weight 69 kg (152 lb 1.6 oz), SpO2 99 %.  Vital signs are normal.  No fever.    Output: Producing urine.    Lungs:  Normal effort and normal respiratory rate.  There are rales and decreased breath sounds.    Heart: Normal rate.  Regular rhythm.  (SR on tele monitor)  Abdomen: Abdomen is soft.  Bowel sounds are normal.     Extremities: There is dependent edema (trace bilateral LE).    Pulses: Distal pulses are intact.    Neurological: Patient is alert and oriented to person, place and time.    Skin:  Warm and dry.  (Sternal dressing clean, dry, and intact)        Results Review:        WBC WBC   Date Value Ref Range Status   03/05/2022 10.27 3.40 - 10.80 10*3/mm3 Final   03/04/2022 12.79 (H) 3.40 - 10.80 10*3/mm3 Final   03/03/2022 12.69 (H) 3.40 - 10.80 10*3/mm3 Final   03/02/2022 15.43 (H) 3.40 " - 10.80 10*3/mm3 Final   03/02/2022 15.61 (H) 3.40 - 10.80 10*3/mm3 Final      HGB Hemoglobin   Date Value Ref Range Status   03/05/2022 8.4 (L) 13.0 - 17.7 g/dL Final   03/04/2022 8.7 (L) 13.0 - 17.7 g/dL Final   03/03/2022 8.7 (L) 13.0 - 17.7 g/dL Final   03/02/2022 10.2 (L) 13.0 - 17.7 g/dL Final   03/02/2022 10.8 (L) 13.0 - 17.7 g/dL Final   03/02/2022 6.8 (L) 12.0 - 17.0 g/dL Final   03/02/2022 6.8 (L) 12.0 - 17.0 g/dL Final   03/02/2022 6.8 (L) 12.0 - 17.0 g/dL Final   03/02/2022 6.8 (L) 12.0 - 17.0 g/dL Final   03/02/2022 6.8 (L) 12.0 - 17.0 g/dL Final   03/02/2022 8.2 (L) 12.0 - 17.0 g/dL Final      HCT Hematocrit   Date Value Ref Range Status   03/05/2022 25.5 (L) 37.5 - 51.0 % Final   03/04/2022 25.5 (L) 37.5 - 51.0 % Final   03/03/2022 25.9 (L) 37.5 - 51.0 % Final   03/02/2022 30.9 (L) 37.5 - 51.0 % Final   03/02/2022 31.2 (L) 37.5 - 51.0 % Final   03/02/2022 20 (L) 38 - 51 % Final   03/02/2022 20 (L) 38 - 51 % Final   03/02/2022 20 (L) 38 - 51 % Final   03/02/2022 20 (L) 38 - 51 % Final   03/02/2022 20 (L) 38 - 51 % Final   03/02/2022 24 (L) 38 - 51 % Final      Platelets Platelets   Date Value Ref Range Status   03/05/2022 172 140 - 450 10*3/mm3 Final   03/04/2022 189 140 - 450 10*3/mm3 Final   03/03/2022 175 140 - 450 10*3/mm3 Final   03/02/2022 225 140 - 450 10*3/mm3 Final   03/02/2022 208 140 - 450 10*3/mm3 Final        PT/INR:    Protime   Date Value Ref Range Status   03/03/2022 15.0 (H) 11.7 - 14.2 Seconds Final   03/02/2022 15.6 (H) 11.7 - 14.2 Seconds Final   /  INR   Date Value Ref Range Status   03/03/2022 1.18 (H) 0.90 - 1.10 Final   03/02/2022 1.24 (H) 0.90 - 1.10 Final       Sodium Sodium   Date Value Ref Range Status   03/05/2022 134 (L) 136 - 145 mmol/L Final   03/04/2022 133 (L) 136 - 145 mmol/L Final   03/03/2022 146 (H) 136 - 145 mmol/L Final   03/02/2022 148 (H) 136 - 145 mmol/L Final   03/02/2022 140 136 - 145 mmol/L Final      Potassium Potassium   Date Value Ref Range Status    03/05/2022 4.1 3.5 - 5.2 mmol/L Final   03/04/2022 4.0 3.5 - 5.2 mmol/L Final   03/03/2022 4.1 3.5 - 5.2 mmol/L Final   03/02/2022 4.3 3.5 - 5.2 mmol/L Final   03/02/2022 4.9 3.5 - 5.2 mmol/L Final      Chloride Chloride   Date Value Ref Range Status   03/05/2022 102 98 - 107 mmol/L Final   03/04/2022 101 98 - 107 mmol/L Final   03/03/2022 111 (H) 98 - 107 mmol/L Final   03/02/2022 111 (H) 98 - 107 mmol/L Final   03/02/2022 108 (H) 98 - 107 mmol/L Final      Bicarbonate CO2   Date Value Ref Range Status   03/05/2022 23.0 22.0 - 29.0 mmol/L Final   03/04/2022 22.8 22.0 - 29.0 mmol/L Final   03/03/2022 22.0 22.0 - 29.0 mmol/L Final   03/02/2022 25.0 22.0 - 29.0 mmol/L Final   03/02/2022 19.0 (L) 22.0 - 29.0 mmol/L Final      BUN BUN   Date Value Ref Range Status   03/05/2022 33 (H) 6 - 20 mg/dL Final   03/04/2022 30 (H) 6 - 20 mg/dL Final   03/03/2022 26 (H) 6 - 20 mg/dL Final   03/02/2022 26 (H) 6 - 20 mg/dL Final   03/02/2022 26 (H) 6 - 20 mg/dL Final      Creatinine Creatinine   Date Value Ref Range Status   03/05/2022 1.23 0.76 - 1.27 mg/dL Final   03/04/2022 1.34 (H) 0.76 - 1.27 mg/dL Final   03/03/2022 1.14 0.76 - 1.27 mg/dL Final   03/02/2022 1.19 0.76 - 1.27 mg/dL Final   03/02/2022 1.14 0.76 - 1.27 mg/dL Final      Calcium Calcium   Date Value Ref Range Status   03/05/2022 8.2 (L) 8.6 - 10.5 mg/dL Final   03/04/2022 8.7 8.6 - 10.5 mg/dL Final   03/03/2022 8.5 (L) 8.6 - 10.5 mg/dL Final   03/02/2022 9.3 8.6 - 10.5 mg/dL Final   03/02/2022 9.7 8.6 - 10.5 mg/dL Final      Magnesium Magnesium   Date Value Ref Range Status   03/03/2022 2.8 (H) 1.6 - 2.6 mg/dL Final   03/02/2022 3.1 (H) 1.6 - 2.6 mg/dL Final   03/02/2022 3.5 (H) 1.6 - 2.6 mg/dL Final          aspirin, 81 mg, Oral, Daily  atorvastatin, 40 mg, Oral, Nightly  calcium gluconate, 1 g, Intravenous, Once  chlorhexidine, 15 mL, Mouth/Throat, Q12H  enoxaparin, 40 mg, Subcutaneous, Daily  furosemide, 40 mg, Oral, Daily  guaiFENesin, 1,200 mg, Oral,  Q12H  insulin glargine, 15 Units, Subcutaneous, Q12H  insulin lispro, 0-9 Units, Subcutaneous, 4x Daily With Meals & Nightly  insulin lispro, 6 Units, Subcutaneous, TID With Meals  metoprolol succinate XL, 25 mg, Oral, Nightly  mupirocin, , Each Nare, BID  pantoprazole, 40 mg, Oral, QAM  polyethylene glycol, 17 g, Oral, Daily  potassium chloride, 20 mEq, Oral, Daily  senna-docusate sodium, 2 tablet, Oral, Nightly      sodium chloride, 30 mL/hr, Last Rate: 15 mL/hr (03/02/22 1632)  sodium chloride, 30 mL/hr        Patient Active Problem List   Diagnosis Code   • Coronary artery disease involving native coronary artery of native heart without angina pectoris I25.10   • Ischemic cardiomyopathy I25.5   • Chronic stable angina ([facility]) I20.8   • Type 2 diabetes mellitus with diabetic peripheral angiopathy without gangrene, with long-term current use of insulin ([facility]) E11.51, Z79.4   • Cerebral palsy ([facility]) G80.9   • Coronary artery disease of native heart with stable angina pectoris ([facility]) I25.118   • Heel ulcer ([facility]) L97.409       Assessment & Plan   -Severe multivessel CAD-- s/p CABGx7 LIMA/LSVG- POD#3 [place]  -DM II  -CVA with peripheral vision disturbance   -cerebral palsy with left sided weakness   -non-healing right foot ulceration-- vascular/wound care consulted  -post op anemia- expected acute blood loss-stable  -leukocytosis--reactive; resolved     Looks good this morning  Weaned to RA and tolerating  Transitioned to Toprol today per cardiology  Mobilize/encourage pulmonary toilet  BG under better control this morning  Increase bowel regimen  Transition to oral diuretics today  Discontinue chest tubes and epicardial wires  Possible discharge home with home health in the next 1-2 days pending progress    TAY Castillo  03/05/22  10:29 EST

## 2022-03-06 LAB
ANION GAP SERPL CALCULATED.3IONS-SCNC: 11 MMOL/L (ref 5–15)
BUN SERPL-MCNC: 36 MG/DL (ref 6–20)
BUN/CREAT SERPL: 36.7 (ref 7–25)
CALCIUM SPEC-SCNC: 8.1 MG/DL (ref 8.6–10.5)
CHLORIDE SERPL-SCNC: 99 MMOL/L (ref 98–107)
CO2 SERPL-SCNC: 23 MMOL/L (ref 22–29)
CREAT SERPL-MCNC: 0.98 MG/DL (ref 0.76–1.27)
DEPRECATED RDW RBC AUTO: 43.1 FL (ref 37–54)
EGFRCR SERPLBLD CKD-EPI 2021: 91.1 ML/MIN/1.73
ERYTHROCYTE [DISTWIDTH] IN BLOOD BY AUTOMATED COUNT: 13.2 % (ref 12.3–15.4)
GLUCOSE BLDC GLUCOMTR-MCNC: 167 MG/DL (ref 70–130)
GLUCOSE BLDC GLUCOMTR-MCNC: 200 MG/DL (ref 70–130)
GLUCOSE BLDC GLUCOMTR-MCNC: 213 MG/DL (ref 70–130)
GLUCOSE BLDC GLUCOMTR-MCNC: 61 MG/DL (ref 70–130)
GLUCOSE BLDC GLUCOMTR-MCNC: 71 MG/DL (ref 70–130)
GLUCOSE SERPL-MCNC: 143 MG/DL (ref 65–99)
HCT VFR BLD AUTO: 23.3 % (ref 37.5–51)
HGB BLD-MCNC: 7.8 G/DL (ref 13–17.7)
MCH RBC QN AUTO: 30 PG (ref 26.6–33)
MCHC RBC AUTO-ENTMCNC: 33.5 G/DL (ref 31.5–35.7)
MCV RBC AUTO: 89.6 FL (ref 79–97)
PLATELET # BLD AUTO: 165 10*3/MM3 (ref 140–450)
PMV BLD AUTO: 9.4 FL (ref 6–12)
POTASSIUM SERPL-SCNC: 4.1 MMOL/L (ref 3.5–5.2)
RBC # BLD AUTO: 2.6 10*6/MM3 (ref 4.14–5.8)
SODIUM SERPL-SCNC: 133 MMOL/L (ref 136–145)
WBC NRBC COR # BLD: 9.33 10*3/MM3 (ref 3.4–10.8)

## 2022-03-06 PROCEDURE — 94761 N-INVAS EAR/PLS OXIMETRY MLT: CPT

## 2022-03-06 PROCEDURE — 85027 COMPLETE CBC AUTOMATED: CPT | Performed by: NURSE PRACTITIONER

## 2022-03-06 PROCEDURE — 25010000002 ENOXAPARIN PER 10 MG: Performed by: NURSE PRACTITIONER

## 2022-03-06 PROCEDURE — 63710000001 INSULIN LISPRO (HUMAN) PER 5 UNITS: Performed by: HOSPITALIST

## 2022-03-06 PROCEDURE — 94799 UNLISTED PULMONARY SVC/PX: CPT

## 2022-03-06 PROCEDURE — 63710000001 INSULIN LISPRO (HUMAN) PER 5 UNITS: Performed by: NURSE PRACTITIONER

## 2022-03-06 PROCEDURE — 97110 THERAPEUTIC EXERCISES: CPT

## 2022-03-06 PROCEDURE — 82962 GLUCOSE BLOOD TEST: CPT

## 2022-03-06 PROCEDURE — 80048 BASIC METABOLIC PNL TOTAL CA: CPT | Performed by: NURSE PRACTITIONER

## 2022-03-06 PROCEDURE — 99024 POSTOP FOLLOW-UP VISIT: CPT | Performed by: THORACIC SURGERY (CARDIOTHORACIC VASCULAR SURGERY)

## 2022-03-06 PROCEDURE — 94760 N-INVAS EAR/PLS OXIMETRY 1: CPT

## 2022-03-06 PROCEDURE — 99232 SBSQ HOSP IP/OBS MODERATE 35: CPT | Performed by: NURSE PRACTITIONER

## 2022-03-06 RX ORDER — IRON POLYSACCHARIDE COMPLEX 150 MG
150 CAPSULE ORAL DAILY
Status: DISCONTINUED | OUTPATIENT
Start: 2022-03-06 | End: 2022-03-07 | Stop reason: HOSPADM

## 2022-03-06 RX ORDER — BISACODYL 10 MG
10 SUPPOSITORY, RECTAL RECTAL ONCE
Status: COMPLETED | OUTPATIENT
Start: 2022-03-06 | End: 2022-03-06

## 2022-03-06 RX ADMIN — DOCUSATE SODIUM 50MG AND SENNOSIDES 8.6MG 2 TABLET: 8.6; 5 TABLET, FILM COATED ORAL at 21:46

## 2022-03-06 RX ADMIN — GUAIFENESIN 1200 MG: 600 TABLET, EXTENDED RELEASE ORAL at 09:15

## 2022-03-06 RX ADMIN — INSULIN LISPRO 4 UNITS: 100 INJECTION, SOLUTION INTRAVENOUS; SUBCUTANEOUS at 18:27

## 2022-03-06 RX ADMIN — ENOXAPARIN SODIUM 40 MG: 100 INJECTION SUBCUTANEOUS at 18:27

## 2022-03-06 RX ADMIN — INSULIN LISPRO 4 UNITS: 100 INJECTION, SOLUTION INTRAVENOUS; SUBCUTANEOUS at 12:09

## 2022-03-06 RX ADMIN — INSULIN LISPRO 8 UNITS: 100 INJECTION, SOLUTION INTRAVENOUS; SUBCUTANEOUS at 06:44

## 2022-03-06 RX ADMIN — GUAIFENESIN 1200 MG: 600 TABLET, EXTENDED RELEASE ORAL at 21:46

## 2022-03-06 RX ADMIN — ATORVASTATIN CALCIUM 40 MG: 20 TABLET, FILM COATED ORAL at 21:45

## 2022-03-06 RX ADMIN — INSULIN GLARGINE-YFGN 20 UNITS: 100 INJECTION, SOLUTION SUBCUTANEOUS at 09:20

## 2022-03-06 RX ADMIN — Medication 150 MG: at 11:59

## 2022-03-06 RX ADMIN — CHLORHEXIDINE GLUCONATE 15 ML: 1.2 RINSE ORAL at 21:47

## 2022-03-06 RX ADMIN — METOPROLOL SUCCINATE 25 MG: 25 TABLET, EXTENDED RELEASE ORAL at 21:46

## 2022-03-06 RX ADMIN — HYDROCODONE BITARTRATE AND ACETAMINOPHEN 2 TABLET: 5; 325 TABLET ORAL at 13:25

## 2022-03-06 RX ADMIN — MUPIROCIN 1 APPLICATION: 20 OINTMENT TOPICAL at 09:15

## 2022-03-06 RX ADMIN — BISACODYL 10 MG: 10 SUPPOSITORY RECTAL at 12:00

## 2022-03-06 RX ADMIN — FUROSEMIDE 40 MG: 40 TABLET ORAL at 09:15

## 2022-03-06 RX ADMIN — CYCLOBENZAPRINE 10 MG: 10 TABLET, FILM COATED ORAL at 21:45

## 2022-03-06 RX ADMIN — ALPRAZOLAM 0.25 MG: 0.25 TABLET ORAL at 21:47

## 2022-03-06 RX ADMIN — POLYETHYLENE GLYCOL 3350 17 G: 17 POWDER, FOR SOLUTION ORAL at 09:15

## 2022-03-06 RX ADMIN — INSULIN LISPRO 2 UNITS: 100 INJECTION, SOLUTION INTRAVENOUS; SUBCUTANEOUS at 06:44

## 2022-03-06 RX ADMIN — ACETAMINOPHEN 650 MG: 325 TABLET ORAL at 21:45

## 2022-03-06 RX ADMIN — HYDROCODONE BITARTRATE AND ACETAMINOPHEN 2 TABLET: 5; 325 TABLET ORAL at 09:15

## 2022-03-06 RX ADMIN — MUPIROCIN 2 APPLICATION: 20 OINTMENT TOPICAL at 21:47

## 2022-03-06 RX ADMIN — HYDROCODONE BITARTRATE AND ACETAMINOPHEN 2 TABLET: 5; 325 TABLET ORAL at 03:17

## 2022-03-06 RX ADMIN — INSULIN LISPRO 8 UNITS: 100 INJECTION, SOLUTION INTRAVENOUS; SUBCUTANEOUS at 18:27

## 2022-03-06 RX ADMIN — ASPIRIN 81 MG: 81 TABLET, COATED ORAL at 09:14

## 2022-03-06 RX ADMIN — INSULIN LISPRO 8 UNITS: 100 INJECTION, SOLUTION INTRAVENOUS; SUBCUTANEOUS at 11:59

## 2022-03-06 RX ADMIN — PANTOPRAZOLE SODIUM 40 MG: 40 TABLET, DELAYED RELEASE ORAL at 06:44

## 2022-03-06 RX ADMIN — POTASSIUM CHLORIDE 20 MEQ: 10 TABLET, EXTENDED RELEASE ORAL at 09:14

## 2022-03-06 RX ADMIN — CHLORHEXIDINE GLUCONATE 15 ML: 1.2 RINSE ORAL at 09:15

## 2022-03-06 RX ADMIN — INSULIN GLARGINE-YFGN 20 UNITS: 100 INJECTION, SOLUTION SUBCUTANEOUS at 21:47

## 2022-03-06 NOTE — PROGRESS NOTES
Name: Pierce Morse ADMIT: 3/2/2022   : 1965  PCP: Farhad Mark MD    MRN: [id number] LOS: 4 days   AGE/SEX: 57 y.o. male  ROOM: /     Subjective   Subjective     Up in the chair.  Feels a little sleepy.  Pain control good.  No new complaints.     Objective   Objective   Vital Signs  Temp:  [97.5 °F (36.4 °C)-98.5 °F (36.9 °C)] 98.5 °F (36.9 °C)  Heart Rate:  [84-98] 87  Resp:  [16-18] 18  BP: (100-140)/(63-83) 104/68  SpO2:  [92 %-96 %] 95 %  on   ;   Device (Oxygen Therapy): room air  Body mass index is 21.71 kg/m².  Physical Exam  Constitutional:       General: He is not in acute distress.     Appearance: He is ill-appearing.   HENT:      Head: Normocephalic and atraumatic.      Nose: Nose normal.      Mouth/Throat:      Mouth: Mucous membranes are moist.   Eyes:      Conjunctiva/sclera: Conjunctivae normal.      Pupils: Pupils are equal, round, and reactive to light.   Cardiovascular:      Rate and Rhythm: Normal rate and regular rhythm.      Comments: Cardiac surgery brace in place  Pulmonary:      Effort: Pulmonary effort is normal. No respiratory distress.      Comments: Diminished at bases  Abdominal:      General: Bowel sounds are normal. There is no distension.      Palpations: Abdomen is soft.      Tenderness: There is no abdominal tenderness.   Musculoskeletal:         General: Swelling (+2 edema) present. No tenderness. Normal range of motion.      Cervical back: Normal range of motion.   Skin:     General: Skin is warm and dry.      Coloration: Skin is pale.   Neurological:      General: No focal deficit present.      Mental Status: He is alert and oriented to person, place, and time.   Psychiatric:         Mood and Affect: Mood normal.         Results Review     I reviewed the patient's new clinical results.  Results from last 7 days   Lab Units 22  0342 22  0320 22  0443 22  0305   WBC 10*3/mm3 9.33 10.27 12.79* 12.69*   HEMOGLOBIN g/dL 7.8* 8.4* 8.7* 8.7*    PLATELETS 10*3/mm3 165 172 189 175     Results from last 7 days   Lab Units 03/06/22  0342 03/05/22 0320 03/04/22  0919 03/03/22  0305   SODIUM mmol/L 133* 134* 133* 146*   POTASSIUM mmol/L 4.1 4.1 4.0 4.1   CHLORIDE mmol/L 99 102 101 111*   CO2 mmol/L 23.0 23.0 22.8 22.0   BUN mg/dL 36* 33* 30* 26*   CREATININE mg/dL 0.98 1.23 1.34* 1.14   GLUCOSE mg/dL 143* 110* 216* 114*     Results from last 7 days   Lab Units 03/03/22  0305 03/02/22 1852 03/02/22 1532 02/28/22  0844   ALBUMIN g/dL 3.80 4.10 4.30 3.70   BILIRUBIN mg/dL  --   --   --  0.3   ALK PHOS U/L  --   --   --  85   AST (SGOT) U/L  --   --   --  10   ALT (SGPT) U/L  --   --   --  15     Results from last 7 days   Lab Units 03/06/22  0342 03/05/22  0320 03/04/22 0919 03/03/22 0305 03/02/22 1852 03/02/22 1532 02/28/22  0844   CALCIUM mg/dL 8.1* 8.2* 8.7 8.5* 9.3 9.7 9.0   ALBUMIN g/dL  --   --   --  3.80 4.10 4.30 3.70   MAGNESIUM mg/dL  --   --   --  2.8* 3.1* 3.5* 2.3   PHOSPHORUS mg/dL  --   --   --  5.4* 4.1 3.0  --        COVID19   Date Value Ref Range Status   02/28/2022 Not Detected Not Detected - Ref. Range Final     SARS-CoV-2, COLLIN   Date Value Ref Range Status   11/07/2021 NEGATIVE Negative Final     Comment:     The 2019-CoV rRT-PCR Assay is only for use under a Food and Drug Administration Emergency Use Authorization. The performance characteristics of the assay were verified by the Clinical Laboratory at [facility]. Results should be used in   conjunction with the patient's clinical symptoms, medical history, and other clinical/laboratory findings to determine an overall clinical diagnosis. Negative results do not preclude infection with SARS-CoV-2 (COVID-19).    Test parameters have not been validated for screening asymptomatic patients.     Glucose   Date/Time Value Ref Range Status   03/06/2022 1131 213 (H) 70 - 130 mg/dL Final     Comment:     Meter: [id number] : [id number] Karthikeyan GREER   03/06/2022 0549  167 (H) 70 - 130 mg/dL Final     Comment:     Meter: [id number] : [id number] Keagan Alonzo NA   03/05/2022 2033 91 70 - 130 mg/dL Final     Comment:     Meter: [id number] : [id number] Uriel Roberts NA   03/05/2022 1605 119 70 - 130 mg/dL Final     Comment:     Meter: [id number] : [id number] Uriel Roberts NA   03/05/2022 1103 397 (H) 70 - 130 mg/dL Final     Comment:     Meter: [id number] : [id number] Hand Lupe MINDY   03/05/2022 0550 104 70 - 130 mg/dL Final     Comment:     Meter: [id number] : [id number] Keagan Alonzo NA   03/04/2022 2124 165 (H) 70 - 130 mg/dL Final     Comment:     Meter: [id number] : [id number] Keagan Alonzo ZOEY       XR Chest 1 View  ONE VIEW PORTABLE CHEST AT 2:42 PM     HISTORY: Recent cardiac surgery. Chest tube removal.     FINDINGS: 2 left-sided chest tubes have been removed since the earlier  exam and there is no evidence of pneumothorax. There is a small amount  of atelectasis and pleural fluid at the lung bases that is unchanged  from 6 hours ago. The heart remains mildly enlarged.     This report was finalized on 3/5/2022 3:20 PM by Dr. Laith Everett M.D.     XR Chest PA & Lateral  TWO-VIEW CHEST     HISTORY: Recent cardiac surgery. Atelectasis.     FINDINGS: The lungs are moderately expanded with 2 left-sided chest  tubes in place and there is no evidence of pneumothorax or change from  yesterday's exam. There remains some atelectasis and pleural fluid at  the bases, right greater than left that is unchanged. The heart remains  mildly enlarged.     This report was finalized on 3/5/2022 8:51 AM by Dr. Laith Everett M.D.       Scheduled Medications  aspirin, 81 mg, Oral, Daily  atorvastatin, 40 mg, Oral, Nightly  bisacodyl, 10 mg, Rectal, Once  chlorhexidine, 15 mL, Mouth/Throat, Q12H  enoxaparin, 40 mg, Subcutaneous, Daily  furosemide, 40 mg, Oral, Daily  guaiFENesin, 1,200 mg, Oral, Q12H  insulin glargine, 20 Units, Subcutaneous, Q12H  insulin  lispro, 0-9 Units, Subcutaneous, 4x Daily With Meals & Nightly  insulin lispro, 8 Units, Subcutaneous, TID With Meals  iron polysaccharides, 150 mg, Oral, Daily  metoprolol succinate XL, 25 mg, Oral, Nightly  mupirocin, , Each Nare, BID  pantoprazole, 40 mg, Oral, QAM  polyethylene glycol, 17 g, Oral, Daily  potassium chloride, 20 mEq, Oral, Daily  senna-docusate sodium, 2 tablet, Oral, Nightly    Infusions  sodium chloride, 30 mL/hr, Last Rate: 15 mL/hr (03/02/22 1632)  sodium chloride, 30 mL/hr    Diet  Diet Regular; Consistent Carbohydrate, Cardiac       Assessment/Plan     Active Hospital Problems    Diagnosis  POA   • **Coronary artery disease involving native coronary artery of native heart without angina pectoris [I25.10]  Yes   • Heel ulcer ([facility]) [L97.409]  Unknown   • Coronary artery disease of native heart with stable angina pectoris ([facility]) [I25.118]  Yes   • Cerebral palsy ([facility]) [G80.9]  Yes   • Type 2 diabetes mellitus with diabetic peripheral angiopathy without gangrene, with long-term current use of insulin ([facility]) [E11.51, Z79.4]  Not Applicable   • Ischemic cardiomyopathy [I25.5]  Yes      Resolved Hospital Problems   No resolved problems to display.       Assessment/Plan:  Mr. Morse is a 57 y.o. male who is s/p CORONARY ARTERY BYPASS GRAFT X5, WITH LEFT INTERNAL MAMMARY HARVEST, MIDLINE STERNOTOMY,  INTRAOP MARIELA, PRP.    Lantus was increased yesterday.  Discussed home regimen with patient.  Blood sugars today as follows: 143, 167, 213.  Continues on 20 units of Lantus twice daily, 8 units of lispro with meals and sliding scale lispro AC at bedtime.  No adjustments for now.  Would consider increasing if he continues to have readings in the 200s.        Lovenox 40 mg SC daily for DVT prophylaxis.  Full code.  Discussed with patient.  Anticipate discharge To be determined,  timing yet to be determined.      TAY Farrell  [facility]  03/06/22  11:46 EST

## 2022-03-06 NOTE — PLAN OF CARE
Goal Outcome Evaluation:  Plan of Care Reviewed With: patient        Progress: improving  Outcome Evaluation: Pt able to ambulate a total of 240ft with CGax1/HHAx1 today. He also performed transfers out of a chair with CGAx1/HHAx1. Overall he does not demonstrate any overt unsteadiness throughout his session. Further mobility deferred secondary to SOA and fatigue. Continued with therex as indicated. Recommend continuing skilled PT to address his impairments and improve his independence with functional mobility.  Patient was wearing a face mask during this therapy encounter. Therapist used appropriate personal protective equipment including eye protection, mask, and gloves.  Mask used was standard procedure mask. Appropriate PPE was worn during the entire therapy session. Hand hygiene was completed before and after therapy session. Patient is not in enhanced droplet precautions.

## 2022-03-06 NOTE — THERAPY TREATMENT NOTE
"Patient Name: Pierce Morse  : 1965    MRN: [id number]                              Today's Date: 3/6/2022       Admit Date: 3/2/2022    Visit Dx:     ICD-10-CM ICD-9-CM   1. S/P CABG (coronary artery bypass graft)  Z95.1 V45.81   2. Coronary artery disease of native heart with stable angina pectoris, unspecified vessel or lesion type ([facility])  I25.118 414.01     413.9     Patient Active Problem List   Diagnosis   • Coronary artery disease involving native coronary artery of native heart without angina pectoris   • Ischemic cardiomyopathy   • Chronic stable angina ([facility])   • Type 2 diabetes mellitus with diabetic peripheral angiopathy without gangrene, with long-term current use of insulin ([facility])   • Cerebral palsy ([facility])   • Coronary artery disease of native heart with stable angina pectoris ([facility])   • Heel ulcer ([facility])     Past Medical History:   Diagnosis Date   • Alcohol abuse     SOBRIETY SINCE AUG 2021   • CAD (coronary artery disease)    • CP (cerebral palsy) ([facility])    • Depression    • Diabetes mellitus ([facility])    • DVT (deep venous thrombosis) ([facility])     PT STATES \"WAS RULED OUT\" ON RIGHT LEG   • Heel ulcer ([facility])     RIGHT.  STATES IN WOUND CARE WITH HOME HEALTH   • Hyperlipidemia    • Hypertension    • Ischemic cardiomyopathy    • PVD (peripheral vascular disease) ([facility])    • Renal insufficiency    • Stroke ([facility]) 2021     Past Surgical History:   Procedure Laterality Date   • CLUB FOOT RELEASE Left     X6   • CORONARY ARTERY BYPASS GRAFT N/A 3/2/2022    Procedure: CORONARY ARTERY BYPASS GRAFT X5, WITH LEFT INTERNAL MAMMARY HARVEST, MIDLINE STERNOTOMY,  INTRAOP MARIELA, PRP;  Surgeon: Jr Eliseo Hewitt MD;  Location: [facility];  Service: Cardiothoracic;  Laterality: N/A;   • ENDOSCOPY     • EYE MUSCLE SURGERY Right       General Information     Row Name 22 8328          Physical Therapy Time and Intention    Document Type therapy note (daily note)  -DO     Mode of Treatment physical therapy  -DO     " Row Name 03/06/22 1540          General Information    Patient Profile Reviewed yes  -DO     Existing Precautions/Restrictions fall;sternal  -DO     Barriers to Rehab previous functional deficit  -DO     Row Name 03/06/22 1540          Cognition    Orientation Status (Cognition) oriented x 4  -DO     Row Name 03/06/22 1540          Safety Issues, Functional Mobility    Impairments Affecting Function (Mobility) muscle tone abnormal  -DO           User Key  (r) = Recorded By, (t) = Taken By, (c) = Cosigned By    Initials Name Provider Type    DO Melvin Chamberlain PT Physical Therapist               Mobility     Row Name 03/06/22 1541          Bed Mobility    Supine-Sit [place] (Bed Mobility) not tested  -DO     Sit-Supine [place] (Bed Mobility) not tested  -DO     Row Name 03/06/22 1541          Sit-Stand Transfer    Sit-Stand [place] (Transfers) contact guard  -DO     Row Name 03/06/22 1541          Gait/Stairs (Locomotion)    [place] Level (Gait) contact guard  -DO     Assistive Device (Gait) --  HHAx1  -DO     Distance in Feet (Gait) 240  -DO     Deviations/Abnormal Patterns (Gait) ataxic  -DO     Bilateral Gait Deviations forward flexed posture  -DO           User Key  (r) = Recorded By, (t) = Taken By, (c) = Cosigned By    Initials Name Provider Type    DO Melvin Chamberlain PT Physical Therapist               Obj/Interventions     Row Name 03/06/22 1542          Motor Skills    Therapeutic Exercise shoulder;knee;ankle  LAQs and ankle pumps x10 reps each; cardiac therex x10 reps  -DO           User Key  (r) = Recorded By, (t) = Taken By, (c) = Cosigned By    Initials Name Provider Type    DO Melvin Chamberlain PT Physical Therapist               Goals/Plan    No documentation.                Clinical Impression     Row Name 03/06/22 1542          Pain    Pretreatment Pain Rating 0/10 - no pain  -DO     Posttreatment Pain Rating 0/10 - no pain  -DO     Row Name 03/06/22 1542          Plan of Care  Review    Plan of Care Reviewed With patient  -DO     Progress improving  -DO     Outcome Evaluation Pt able to ambulate a total of 240ft with CGax1/HHAx1 today. He also performed transfers out of a chair with CGAx1/HHAx1. Overall he does not demonstrate any overt unsteadiness throughout his session. Further mobility deferred secondary to SOA and fatigue. Continued with therex as indicated. Recommend continuing skilled PT to address his impairments and improve his independence with functional mobility.  -DO     Row Name 03/06/22 1542          Therapy Assessment/Plan (PT)    Rehab Potential (PT) good, to achieve stated therapy goals  -DO     Criteria for Skilled Interventions Met (PT) yes  -DO     Row Name 03/06/22 1542          Positioning and Restraints    Pre-Treatment Position sitting in chair/recliner  -DO     Post Treatment Position chair  -DO     In Chair reclined;call light within reach;encouraged to call for assist;exit alarm on  -DO           User Key  (r) = Recorded By, (t) = Taken By, (c) = Cosigned By    Initials Name Provider Type    DO Melvin Chamberlain, PT Physical Therapist               Outcome Measures     Row Name 03/06/22 1544          How much help from another person do you currently need...    Turning from your back to your side while in flat bed without using bedrails? 4  -DO     Moving from lying on back to sitting on the side of a flat bed without bedrails? 4  -DO     Moving to and from a bed to a chair (including a wheelchair)? 3  -DO     Standing up from a chair using your arms (e.g., wheelchair, bedside chair)? 3  -DO     Climbing 3-5 steps with a railing? 2  -DO     To walk in hospital room? 3  -DO     AM-PAC 6 Clicks Score (PT) 19  -DO     [facility] Name 03/06/22 1544          Functional Assessment    Outcome Measure Options AM-PAC 6 Clicks Basic Mobility (PT)  -DO           User Key  (r) = Recorded By, (t) = Taken By, (c) = Cosigned By    Initials Name Provider Type    DO Melvin Chamberlain, PT  Physical Therapist                             Physical Therapy Education                 Title: PT OT SLP Therapies (Done)     Topic: Physical Therapy (Done)     Point: Mobility training (Done)     Learning Progress Summary           Patient Acceptance, E, VU by DO at 3/6/2022 1544    Acceptance, E,TB,D, VU,NR by  at 3/4/2022 1257    Acceptance, E, NR by EM at 3/3/2022 1124                   Point: Home exercise program (Done)     Learning Progress Summary           Patient Acceptance, E, VU by DO at 3/6/2022 1544    Acceptance, E,TB,D, VU,NR by BH at 3/4/2022 1257    Acceptance, E, NR by EM at 3/3/2022 1124                   Point: Body mechanics (Done)     Learning Progress Summary           Patient Acceptance, E, VU by DO at 3/6/2022 1544    Acceptance, E,TB,D, VU,NR by  at 3/4/2022 1257                   Point: Precautions (Done)     Learning Progress Summary           Patient Acceptance, E, VU by DO at 3/6/2022 1544    Acceptance, E,TB,D, VU,NR by  at 3/4/2022 1257                               User Key     Initials Effective Dates Name Provider Type Discipline    EM 06/16/21 -  Kacie Live, PT Physical Therapist PT    DO 03/07/18 -  Melvin Chamberlain PT Physical Therapist PT     05/10/21 -  Juanita Sr Physical Therapist PT              PT Recommendation and Plan     Plan of Care Reviewed With: patient  Progress: improving  Outcome Evaluation: Pt able to ambulate a total of 240ft with CGax1/HHAx1 today. He also performed transfers out of a chair with CGAx1/HHAx1. Overall he does not demonstrate any overt unsteadiness throughout his session. Further mobility deferred secondary to SOA and fatigue. Continued with therex as indicated. Recommend continuing skilled PT to address his impairments and improve his independence with functional mobility.     Time Calculation:    PT Charges     Row Name 03/06/22 1545             Time Calculation    Start Time 1416  -DO      Stop Time 1426  -DO      Time  Calculation (min) 10 min  -DO      PT Received On 03/06/22  -DO      PT - Next Appointment 03/07/22  -DO            User Key  (r) = Recorded By, (t) = Taken By, (c) = Cosigned By    Initials Name Provider Type    Melvin Young, PT Physical Therapist              Therapy Charges for Today     Code Description Service Date Service Provider Modifiers Qty    [id number] HC PT THER PROC EA 15 MIN 3/6/2022 Melvin Chamberlain, PT GP 1          PT G-Codes  Outcome Measure Options: AM-PAC 6 Clicks Basic Mobility (PT)  AM-PAC 6 Clicks Score (PT): 19    Melvin Chamberlain, PT  3/6/2022

## 2022-03-06 NOTE — PROGRESS NOTES
HOSPITAL PROGRESS NOTE    Date of Service: 22  LOS:  LOS: 4 days   Patient Name: Pierce Morse  Age/Sex: 57 y.o. male  : 1965  MRN: [id number]  Primary Cardiologist: Dr. Bladimir Aceves    Subjective:     Chief Complaint/Follow up:   CAD, status post CABG x7, right heel ulcer    Interval History:   POD #4.  Sitting in chair.  Fatigued today because he did not sleep well last night.  Denies chest pain, incisional pain, dyspnea, or palpitations.    Objective:     Objective:  Temp:  [97.5 °F (36.4 °C)-98.5 °F (36.9 °C)] 98.5 °F (36.9 °C)  Heart Rate:  [84-98] 88  Resp:  [16-18] 18  BP: (100-144)/(63-86) 116/83  Body mass index is 21.71 kg/m².    Intake/Output Summary (Last 24 hours) at 3/6/2022 0956  Last data filed at 3/6/2022 0830  Gross per 24 hour   Intake 2010 ml   Output 2175 ml   Net -165 ml         22  0617 22  0545 22  0420   Weight: 68.9 kg (152 lb) 69 kg (152 lb 1.6 oz) 68.6 kg (151 lb 4.8 oz)     Weight change: -0.363 kg (-12.8 oz)    Physical Exam:   General Appearance: Alert, cooperative, in no acute distress. AAOx4.  Fatigued.  HEENT: Normocephalic.  Wearing glasses.  Neck: Supple. No JVD. No carotid bruit. No thyromegaly  Lungs: Clear. Normal respiratory effort and rate.  Heart: Regular rate and rhythm, normal S1 and S2, no murmurs, gallops or rubs.  Abdomen: Soft, nontender, nondistended.  Extremities: Warm, no cyanosis, or clubbing. No edema.   Chest incision clean dry and intact.    Lab Review:   Results from last 7 days   Lab Units 22  0342 22  0320 22  1532 22  0844   SODIUM mmol/L 133* 134*   < > 136   POTASSIUM mmol/L 4.1 4.1   < > 4.6   CHLORIDE mmol/L 99 102   < > 102   CO2 mmol/L 23.0 23.0   < > 23.9   BUN mg/dL 36* 33*   < > 20   CREATININE mg/dL 0.98 1.23   < > 0.99   GLUCOSE mg/dL 143* 110*   < > 173*   CALCIUM mg/dL 8.1* 8.2*   < > 9.0   AST (SGOT) U/L  --   --   --  10   ALT (SGPT) U/L  --   --   --  15    < > = values  in this interval not displayed.         Results from last 7 days   Lab Units 03/06/22  0342 03/05/22  0320   WBC 10*3/mm3 9.33 10.27   HEMOGLOBIN g/dL 7.8* 8.4*   HEMATOCRIT % 23.3* 25.5*   PLATELETS 10*3/mm3 165 172     Results from last 7 days   Lab Units 03/03/22  0305 03/02/22  1532 02/28/22  0844   INR  1.18* 1.24* 0.97   APTT seconds  --  26.8 27.4     Results from last 7 days   Lab Units 03/03/22  0305 03/02/22  1852   MAGNESIUM mg/dL 2.8* 3.1*     Results from last 7 days   Lab Units 02/28/22  0844   CHOLESTEROL mg/dL 128   TRIGLYCERIDES mg/dL 86   HDL CHOL mg/dL 39*     Results from last 7 days   Lab Units 02/28/22  0844   PROBNP pg/mL 91.6           Results for orders placed in visit on 03/02/22    Diagnostic Intraop MARIELA    Narrative  Procedure Performed: Diagnostic Intraop MARIELA  Start Time:  End Time:    Preanesthesia Checklist:  Patient identified, IV assessed, risks and benefits discussed, monitors and equipment assessed, procedure being performed at surgeon's request and anesthesia consent obtained.    General Procedure Information  Diagnostic Indications for Echo:  assessment of surgical repair, defect repair evaluation and hemodynamic monitoring  Physician Requesting Echo: Jr Eliseo Hewitt MD  Location performed:  OR  Intubated  Bite block placed  Heart visualized  Probe Insertion:  Easy  Probe Type:  Multiplane  Modalities:  2D only, continuous wave Doppler, pulse wave Doppler and color flow mapping    Echocardiographic and Doppler Measurements    Ventricles    Right Ventricle:  Cavity size normal.  Thrombus not present.  Global function normal.  Left Ventricle:  Cavity size normal.  Hypertrophy present.  Thrombus not present.  Global Function normal.  Ejection Fraction 50%.        Valves    Aortic Valve:  Annulus normal.  Regurgitation absent.  Leaflets normal.  Leaflet motions normal.    Mitral Valve:  Annulus normal.  Vena Contracta Width: 0.15 cm.  Regurgitation mild.  Leaflets normal.   Leaflet motions normal.    Tricuspid Valve:  Annulus normal.  Regurgitation trace.  Leaflets normal.  Leaflet motions normal.  Pulmonic Valve:  Annulus normal.  Regurgitation trace.        Aorta    Ascending Aorta:  Size normal.  Dissection not present.  Aortic Arch:  Size normal.  Dissection not present.  Descending Aorta:  Size normal.  Dissection not present.        Atria    Right Atrium:  Size normal.  Left Atrium:  Size normal.  Spontaneous echo contrast not present.  Left atrial appendage normal.      Septa    Atrial Septum:  Intra-atrial septal morphology normal.    Ventricular Septum:  Intra-ventricular septum morphology normal.    Diastolic Function Measurements:  Diastolic Dysfunction Grade= indeterminate  E= ms  A= ms  E/A Ratio=  DT= ms  S/D=  IVRT=    Other Findings  Pericardium:  normal  Pleural Effusion:  none  Pulmonary Arteries:  normal  Pulmonary Venous Flow:  blunted (decreased) systolic flow    Anesthesia Information  Performed Personally  Anesthesiologist:  Garth Burton MD      Echocardiogram Comments:  57 year old male with CP, HTN, HLD and CAD presents for CABG.  - Mildly dilated LV, normal LV function (LVEF 50%, no significant RWMAs)  - Normal RV size, function  - Mild MR (central, brief), trace TR/PI  - No intracardiac thrombus  - No intracardiac shunt  - No pericardial/pleural effusion  - No aortic dissection    S/p 6v CABG:  - Hyperdynamic LV function (LVEF >55%)  - Trace MR  - No pericardial/pleural effusion  - No aortic dissection    Findings discussed with surgical team    I reviewed the patient's new clinical results.  I personally viewed and interpreted the patient's EKG/Telemetry data/Labs/Test Results.     Current Medications:   Scheduled Meds:aspirin, 81 mg, Oral, Daily  atorvastatin, 40 mg, Oral, Nightly  bisacodyl, 10 mg, Rectal, Once  chlorhexidine, 15 mL, Mouth/Throat, Q12H  enoxaparin, 40 mg, Subcutaneous, Daily  furosemide, 40 mg, Oral, Daily  guaiFENesin, 1,200 mg,  Oral, Q12H  insulin glargine, 20 Units, Subcutaneous, Q12H  insulin lispro, 0-9 Units, Subcutaneous, 4x Daily With Meals & Nightly  insulin lispro, 8 Units, Subcutaneous, TID With Meals  iron polysaccharides, 150 mg, Oral, Daily  metoprolol succinate XL, 25 mg, Oral, Nightly  mupirocin, , Each Nare, BID  pantoprazole, 40 mg, Oral, QAM  polyethylene glycol, 17 g, Oral, Daily  potassium chloride, 20 mEq, Oral, Daily  senna-docusate sodium, 2 tablet, Oral, Nightly      Continuous Infusions:sodium chloride, 30 mL/hr, Last Rate: 15 mL/hr (03/02/22 1632)  sodium chloride, 30 mL/hr        Allergies:  No Known Allergies    Assessment:       Coronary artery disease involving native coronary artery of native heart without angina pectoris    Ischemic cardiomyopathy    Type 2 diabetes mellitus with diabetic peripheral angiopathy without gangrene, with long-term current use of insulin (HCC)    Cerebral palsy (HCC)    Coronary artery disease of native heart with stable angina pectoris (HCC)    Heel ulcer (HCC)        Plan:   Assessment/Plan       1.  Coronary Artery Disease-severe multivessel-status post CABG x7.  POD #4.  2.  Postop anemia.  7.8 this morning.  Iron supplement added by CT surgery.  3.  Ischemic Cardiomyopathy-LVEF 30%; MARIELA showed EF of 50%.  Switched from metoprolol tartrate to succinate on 3/5.    3.  CVA with peripheral vision disturbance.  4.  Diabetes  5.  Nonhealing right foot ulceration-vascular/wound care consulted  6.  Constipation-suppository ordered by CV surgery.  Mild carotid artery stenosis  7.  Possible discharged with home health the next 1 to 2 days per CV surgery.      TAY Kwan  [facility]   03/06/22  09:56 EST

## 2022-03-06 NOTE — PROGRESS NOTES
" LOS: 4 days   Patient Care Team:  Farhad Mark MD as PCP - General (Internal Medicine)    Chief Complaint: post op    Subjective:  Symptoms:  No shortness of breath or cough.    Diet:  Adequate intake.  No nausea or vomiting.    Activity level: Impaired due to weakness.    Pain:  He complains of pain that is mild.  Pain is well controlled.      Vital Signs  Temp:  [97.5 °F (36.4 °C)-98.5 °F (36.9 °C)] 98.5 °F (36.9 °C)  Heart Rate:  [84-98] 88  Resp:  [16-18] 18  BP: (100-144)/(63-86) 116/83  Body mass index is 21.71 kg/m².    Intake/Output Summary (Last 24 hours) at 3/6/2022 0848  Last data filed at 3/6/2022 0830  Gross per 24 hour   Intake 2010 ml   Output 2175 ml   Net -165 ml     I/O this shift:  In: 240 [P.O.:240]  Out: 300 [Urine:300]          03/04/22  0617 03/05/22  0545 03/06/22  0420   Weight: 68.9 kg (152 lb) 69 kg (152 lb 1.6 oz) 68.6 kg (151 lb 4.8 oz)       Objective:  General Appearance:  Comfortable and in no acute distress.    Vital signs: (most recent): Blood pressure 116/83, pulse 88, temperature 98.5 °F (36.9 °C), temperature source Oral, resp. rate 18, height 177.8 cm (70\"), weight 68.6 kg (151 lb 4.8 oz), SpO2 93 %.  Vital signs are normal.  No fever.    Output: Producing urine.    Lungs:  Normal effort and normal respiratory rate.  There are rales and decreased breath sounds.    Heart: Normal rate.  Regular rhythm.  (SR on tele monitor)  Abdomen: Abdomen is soft.  Bowel sounds are normal.     Extremities: There is dependent edema (mild bilateral LE).    Pulses: Distal pulses are intact.    Neurological: Patient is alert and oriented to person, place and time.    Skin:  Warm and dry.  (Sternal wound clean, dry, and intact)      Results Review:        WBC WBC   Date Value Ref Range Status   03/06/2022 9.33 3.40 - 10.80 10*3/mm3 Final   03/05/2022 10.27 3.40 - 10.80 10*3/mm3 Final   03/04/2022 12.79 (H) 3.40 - 10.80 10*3/mm3 Final      HGB Hemoglobin   Date Value Ref Range Status   03/06/2022 " 7.8 (L) 13.0 - 17.7 g/dL Final   03/05/2022 8.4 (L) 13.0 - 17.7 g/dL Final   03/04/2022 8.7 (L) 13.0 - 17.7 g/dL Final      HCT Hematocrit   Date Value Ref Range Status   03/06/2022 23.3 (L) 37.5 - 51.0 % Final   03/05/2022 25.5 (L) 37.5 - 51.0 % Final   03/04/2022 25.5 (L) 37.5 - 51.0 % Final      Platelets Platelets   Date Value Ref Range Status   03/06/2022 165 140 - 450 10*3/mm3 Final   03/05/2022 172 140 - 450 10*3/mm3 Final   03/04/2022 189 140 - 450 10*3/mm3 Final        PT/INR:    No results found for: PROTIME/  No results found for: INR    Sodium Sodium   Date Value Ref Range Status   03/06/2022 133 (L) 136 - 145 mmol/L Final   03/05/2022 134 (L) 136 - 145 mmol/L Final   03/04/2022 133 (L) 136 - 145 mmol/L Final      Potassium Potassium   Date Value Ref Range Status   03/06/2022 4.1 3.5 - 5.2 mmol/L Final   03/05/2022 4.1 3.5 - 5.2 mmol/L Final   03/04/2022 4.0 3.5 - 5.2 mmol/L Final      Chloride Chloride   Date Value Ref Range Status   03/06/2022 99 98 - 107 mmol/L Final   03/05/2022 102 98 - 107 mmol/L Final   03/04/2022 101 98 - 107 mmol/L Final      Bicarbonate CO2   Date Value Ref Range Status   03/06/2022 23.0 22.0 - 29.0 mmol/L Final   03/05/2022 23.0 22.0 - 29.0 mmol/L Final   03/04/2022 22.8 22.0 - 29.0 mmol/L Final      BUN BUN   Date Value Ref Range Status   03/06/2022 36 (H) 6 - 20 mg/dL Final   03/05/2022 33 (H) 6 - 20 mg/dL Final   03/04/2022 30 (H) 6 - 20 mg/dL Final      Creatinine Creatinine   Date Value Ref Range Status   03/06/2022 0.98 0.76 - 1.27 mg/dL Final   03/05/2022 1.23 0.76 - 1.27 mg/dL Final   03/04/2022 1.34 (H) 0.76 - 1.27 mg/dL Final      Calcium Calcium   Date Value Ref Range Status   03/06/2022 8.1 (L) 8.6 - 10.5 mg/dL Final   03/05/2022 8.2 (L) 8.6 - 10.5 mg/dL Final   03/04/2022 8.7 8.6 - 10.5 mg/dL Final      Magnesium No results found for: MG       aspirin, 81 mg, Oral, Daily  atorvastatin, 40 mg, Oral, Nightly  chlorhexidine, 15 mL, Mouth/Throat, Q12H  enoxaparin, 40  mg, Subcutaneous, Daily  furosemide, 40 mg, Oral, Daily  guaiFENesin, 1,200 mg, Oral, Q12H  insulin glargine, 20 Units, Subcutaneous, Q12H  insulin lispro, 0-9 Units, Subcutaneous, 4x Daily With Meals & Nightly  insulin lispro, 8 Units, Subcutaneous, TID With Meals  iron polysaccharides, 150 mg, Oral, Daily  metoprolol succinate XL, 25 mg, Oral, Nightly  mupirocin, , Each Nare, BID  pantoprazole, 40 mg, Oral, QAM  polyethylene glycol, 17 g, Oral, Daily  potassium chloride, 20 mEq, Oral, Daily  senna-docusate sodium, 2 tablet, Oral, Nightly      sodium chloride, 30 mL/hr, Last Rate: 15 mL/hr (03/02/22 1632)  sodium chloride, 30 mL/hr        Patient Active Problem List   Diagnosis Code   • Coronary artery disease involving native coronary artery of native heart without angina pectoris I25.10   • Ischemic cardiomyopathy I25.5   • Chronic stable angina ([facility]) I20.8   • Type 2 diabetes mellitus with diabetic peripheral angiopathy without gangrene, with long-term current use of insulin ([facility]) E11.51, Z79.4   • Cerebral palsy ([facility]) G80.9   • Coronary artery disease of native heart with stable angina pectoris ([facility]) I25.118   • Heel ulcer ([facility]) L97.409       Assessment & Plan   -Severe multivessel CAD-- s/p CABGx7 LIMA/LSVG- POD#4 [place]  -DM II  -CVA with peripheral vision disturbance   -cerebral palsy with left sided weakness   -non-healing right foot ulceration-- vascular/wound care consulted  -post op anemia- expected acute blood loss  -leukocytosis--reactive; resolved     Looks good this morning  Weaned to RA and tolerating  Tolerating change in beta blocker  Hgb down to 7.8 this morning. Iron supplement added--will recheck in am  Mobilize/encourage pulmonary toilet  No BM since surgery--will give suppository today  Excellent response to oral diuretics  Possible discharge home with home health in the next 1-2 days pending progress  Continue routine care    Stephanie Boyce, TAY  03/06/22  08:48 EST

## 2022-03-07 ENCOUNTER — HOME HEALTH ADMISSION (OUTPATIENT)
Dept: HOME HEALTH SERVICES | Facility: HOME HEALTHCARE | Age: 57
End: 2022-03-07

## 2022-03-07 VITALS
TEMPERATURE: 98.5 F | SYSTOLIC BLOOD PRESSURE: 129 MMHG | RESPIRATION RATE: 18 BRPM | DIASTOLIC BLOOD PRESSURE: 66 MMHG | OXYGEN SATURATION: 94 % | WEIGHT: 151.5 LBS | BODY MASS INDEX: 21.69 KG/M2 | HEART RATE: 92 BPM | HEIGHT: 70 IN

## 2022-03-07 LAB
ANION GAP SERPL CALCULATED.3IONS-SCNC: 8 MMOL/L (ref 5–15)
BUN SERPL-MCNC: 31 MG/DL (ref 6–20)
BUN/CREAT SERPL: 34.1 (ref 7–25)
CALCIUM SPEC-SCNC: 8.2 MG/DL (ref 8.6–10.5)
CHLORIDE SERPL-SCNC: 103 MMOL/L (ref 98–107)
CO2 SERPL-SCNC: 25 MMOL/L (ref 22–29)
CREAT SERPL-MCNC: 0.91 MG/DL (ref 0.76–1.27)
DEPRECATED RDW RBC AUTO: 42.9 FL (ref 37–54)
EGFRCR SERPLBLD CKD-EPI 2021: 99.5 ML/MIN/1.73
ERYTHROCYTE [DISTWIDTH] IN BLOOD BY AUTOMATED COUNT: 13.4 % (ref 12.3–15.4)
GLUCOSE BLDC GLUCOMTR-MCNC: 141 MG/DL (ref 70–130)
GLUCOSE BLDC GLUCOMTR-MCNC: 50 MG/DL (ref 70–130)
GLUCOSE BLDC GLUCOMTR-MCNC: 90 MG/DL (ref 70–130)
GLUCOSE SERPL-MCNC: 67 MG/DL (ref 65–99)
HCT VFR BLD AUTO: 22.8 % (ref 37.5–51)
HGB BLD-MCNC: 7.8 G/DL (ref 13–17.7)
MCH RBC QN AUTO: 30.2 PG (ref 26.6–33)
MCHC RBC AUTO-ENTMCNC: 34.2 G/DL (ref 31.5–35.7)
MCV RBC AUTO: 88.4 FL (ref 79–97)
PLATELET # BLD AUTO: 192 10*3/MM3 (ref 140–450)
PMV BLD AUTO: 9.9 FL (ref 6–12)
POTASSIUM SERPL-SCNC: 4.3 MMOL/L (ref 3.5–5.2)
RBC # BLD AUTO: 2.58 10*6/MM3 (ref 4.14–5.8)
SODIUM SERPL-SCNC: 136 MMOL/L (ref 136–145)
WBC NRBC COR # BLD: 8.85 10*3/MM3 (ref 3.4–10.8)

## 2022-03-07 PROCEDURE — 63710000001 INSULIN LISPRO (HUMAN) PER 5 UNITS: Performed by: HOSPITALIST

## 2022-03-07 PROCEDURE — 85027 COMPLETE CBC AUTOMATED: CPT | Performed by: NURSE PRACTITIONER

## 2022-03-07 PROCEDURE — 80048 BASIC METABOLIC PNL TOTAL CA: CPT | Performed by: NURSE PRACTITIONER

## 2022-03-07 PROCEDURE — 99232 SBSQ HOSP IP/OBS MODERATE 35: CPT | Performed by: NURSE PRACTITIONER

## 2022-03-07 PROCEDURE — 82962 GLUCOSE BLOOD TEST: CPT

## 2022-03-07 RX ORDER — POTASSIUM CHLORIDE 20 MEQ/1
20 TABLET, EXTENDED RELEASE ORAL AS NEEDED
Qty: 7 TABLET | Refills: 0 | Status: SHIPPED | OUTPATIENT
Start: 2022-03-07

## 2022-03-07 RX ORDER — FUROSEMIDE 40 MG/1
40 TABLET ORAL DAILY
Qty: 7 TABLET | Refills: 0 | Status: SHIPPED | OUTPATIENT
Start: 2022-03-08

## 2022-03-07 RX ORDER — IRON POLYSACCHARIDE COMPLEX 150 MG
150 CAPSULE ORAL DAILY
Qty: 30 CAPSULE | Refills: 3 | Status: SHIPPED | OUTPATIENT
Start: 2022-03-08

## 2022-03-07 RX ORDER — ACETAMINOPHEN 325 MG/1
650 TABLET ORAL EVERY 4 HOURS PRN
Start: 2022-03-07

## 2022-03-07 RX ORDER — CYCLOBENZAPRINE HCL 10 MG
10 TABLET ORAL EVERY 8 HOURS PRN
Qty: 30 TABLET | Refills: 0 | Status: SHIPPED | OUTPATIENT
Start: 2022-03-07 | End: 2022-03-30 | Stop reason: SDUPTHER

## 2022-03-07 RX ORDER — METOPROLOL SUCCINATE 25 MG/1
25 TABLET, EXTENDED RELEASE ORAL NIGHTLY
Qty: 30 TABLET | Refills: 3 | Status: SHIPPED | OUTPATIENT
Start: 2022-03-07

## 2022-03-07 RX ORDER — HYDROCODONE BITARTRATE AND ACETAMINOPHEN 5; 325 MG/1; MG/1
1 TABLET ORAL EVERY 4 HOURS PRN
Qty: 42 TABLET | Refills: 0 | Status: SHIPPED | OUTPATIENT
Start: 2022-03-07 | End: 2022-03-14

## 2022-03-07 RX ADMIN — Medication 150 MG: at 10:14

## 2022-03-07 RX ADMIN — PANTOPRAZOLE SODIUM 40 MG: 40 TABLET, DELAYED RELEASE ORAL at 07:42

## 2022-03-07 RX ADMIN — HYDROCODONE BITARTRATE AND ACETAMINOPHEN 2 TABLET: 5; 325 TABLET ORAL at 14:59

## 2022-03-07 RX ADMIN — HYDROCODONE BITARTRATE AND ACETAMINOPHEN 1 TABLET: 5; 325 TABLET ORAL at 04:38

## 2022-03-07 RX ADMIN — FUROSEMIDE 40 MG: 40 TABLET ORAL at 10:14

## 2022-03-07 RX ADMIN — ASPIRIN 81 MG: 81 TABLET, COATED ORAL at 10:14

## 2022-03-07 RX ADMIN — POTASSIUM CHLORIDE 20 MEQ: 10 TABLET, EXTENDED RELEASE ORAL at 10:14

## 2022-03-07 RX ADMIN — GUAIFENESIN 1200 MG: 600 TABLET, EXTENDED RELEASE ORAL at 10:14

## 2022-03-07 RX ADMIN — CHLORHEXIDINE GLUCONATE 15 ML: 1.2 RINSE ORAL at 10:13

## 2022-03-07 RX ADMIN — MUPIROCIN 1 APPLICATION: 20 OINTMENT TOPICAL at 10:13

## 2022-03-07 RX ADMIN — POLYETHYLENE GLYCOL 3350 17 G: 17 POWDER, FOR SOLUTION ORAL at 10:14

## 2022-03-07 RX ADMIN — INSULIN GLARGINE-YFGN 20 UNITS: 100 INJECTION, SOLUTION SUBCUTANEOUS at 10:15

## 2022-03-07 RX ADMIN — HYDROCODONE BITARTRATE AND ACETAMINOPHEN 2 TABLET: 5; 325 TABLET ORAL at 10:27

## 2022-03-07 RX ADMIN — INSULIN LISPRO 8 UNITS: 100 INJECTION, SOLUTION INTRAVENOUS; SUBCUTANEOUS at 07:42

## 2022-03-07 RX ADMIN — INSULIN LISPRO 8 UNITS: 100 INJECTION, SOLUTION INTRAVENOUS; SUBCUTANEOUS at 11:24

## 2022-03-07 NOTE — PROGRESS NOTES
"    Patient Name: Pierce Morse  :1965  57 y.o.      Patient Care Team:  Farhad Mark MD as PCP - General (Internal Medicine)    Chief Complaint: follow up coronary artery disease    Interval History: he is on room air. He feels well. Pain averages 4/10. He  Had a bowel movement yesterday.       Objective   Vital Signs  Temp:  [98.1 °F (36.7 °C)-98.5 °F (36.9 °C)] 98.5 °F (36.9 °C)  Heart Rate:  [88-96] 92  Resp:  [18] 18  BP: (113-137)/(65-82) 129/66    Intake/Output Summary (Last 24 hours) at 3/7/2022 1254  Last data filed at 3/7/2022 1151  Gross per 24 hour   Intake 1160 ml   Output 1000 ml   Net 160 ml     Flowsheet Rows    Flowsheet Row First Filed Value   Admission Height 177.8 cm (70\") Documented at 2022 1208   Admission Weight 66.8 kg (147 lb 4.3 oz) Documented at 2022 0600          Physical Exam:   General Appearance:    Alert, cooperative, in no acute distress   Lungs:     Clear to auscultation.  Normal respiratory effort and rate.      Heart:    Regular rhythm and normal rate, normal S1 and S2, no murmurs, gallops or rubs.     Chest Wall:    No abnormalities observed incision open to air, well approximated, no drainage   Abdomen:     Soft, nontender, positive bowel sounds.     Extremities:   no cyanosis, clubbing or edema.  No marked joint deformities.  Adequate musculoskeletal strength.       Results Review:    Results from last 7 days   Lab Units 22  0349   SODIUM mmol/L 136   POTASSIUM mmol/L 4.3   CHLORIDE mmol/L 103   CO2 mmol/L 25.0   BUN mg/dL 31*   CREATININE mg/dL 0.91   GLUCOSE mg/dL 67   CALCIUM mg/dL 8.2*         Results from last 7 days   Lab Units 22  0350   WBC 10*3/mm3 8.85   HEMOGLOBIN g/dL 7.8*   HEMATOCRIT % 22.8*   PLATELETS 10*3/mm3 192     Results from last 7 days   Lab Units 22  0305 22  1532   INR  1.18* 1.24*   APTT seconds  --  26.8     Results from last 7 days   Lab Units 22  0305   MAGNESIUM mg/dL 2.8*                 "   Medication Review:   aspirin, 81 mg, Oral, Daily  atorvastatin, 40 mg, Oral, Nightly  chlorhexidine, 15 mL, Mouth/Throat, Q12H  enoxaparin, 40 mg, Subcutaneous, Daily  furosemide, 40 mg, Oral, Daily  guaiFENesin, 1,200 mg, Oral, Q12H  insulin glargine, 20 Units, Subcutaneous, Q12H  insulin lispro, 0-9 Units, Subcutaneous, 4x Daily With Meals & Nightly  insulin lispro, 8 Units, Subcutaneous, TID With Meals  iron polysaccharides, 150 mg, Oral, Daily  metoprolol succinate XL, 25 mg, Oral, Nightly  mupirocin, , Each Nare, BID  pantoprazole, 40 mg, Oral, QAM  polyethylene glycol, 17 g, Oral, Daily  potassium chloride, 20 mEq, Oral, Daily  senna-docusate sodium, 2 tablet, Oral, Nightly         sodium chloride, 30 mL/hr, Last Rate: 15 mL/hr (03/02/22 1632)  sodium chloride, 30 mL/hr        Assessment/Plan   1. Coronary artery disease with severe multivessel disease, status post CABG x 7 POD #5.   2. Ischemic cardiomyopathy . EF 30%. intrap op MARIELA showed 50% pre. On metoprolol succinate.  3. Post op anemia - expected. On oral iron supplement.  4. Diabetes mellitus type II with circulatory complication. Insulin dependent.  5. Nonhealing right foot ulceration. Vascular wound care consulted.   6. Mild carotid artery stenosis    Pretty unremarkable post op course. Maintained SR. Vitals stable. Continue supportive post op care. Likely dc soon. Follow up with Dr. Aceves 8:30 AM. April 7th.     TAY Dumont  [facility]  03/07/22  12:54 EST  .

## 2022-03-07 NOTE — EXTERNAL PATIENT INSTRUCTIONS
Patient Education   Table of Contents       Coronary Artery Bypass Grafting, Care After       Coronary Artery Disease, Male       Cyclobenzaprine tablets       Furosemide Oral Tablets       Heart-Healthy Eating Plan       Incision Care, Adult       Iron Deficiency Anemia, Adult       Metoprolol Tablets     To view videos and all your education online visit,   [email or web address]   or scan this QR code with your smartphone.                  Coronary Artery Bypass Grafting, Care After     This sheet gives you information about how to care for yourself after your procedure. Your doctor may also give you more specific instructions. If you have problems or questions, call your doctor.   What can I expect after the procedure?    After the procedure, it is common to:       Feel sick to your stomach (nauseous).       Not want to eat as much as normal (lack of appetite).       Have trouble pooping (constipation).       Have weakness and tiredness (fatigue).       Feel sad (depressed) or grouchy (irritable).       Have pain or discomfort around the cuts from surgery (incisions).     Follow these instructions at home:   Medicines         Take over-the-counter and prescription medicines only as told by your doctor. Do not  stop taking medicines or start any new medicines unless your doctor says it is okay.       If you were prescribed an antibiotic medicine, take it as told by your doctor. Do not  stop taking the antibiotic even if you start to feel better.     Incision care           Follow instructions from your doctor about how to take care of your cuts from surgery. Make sure you:       Wash your hands with soap and water before and after you change your bandage (dressing). If you cannot use soap and water, use hand .       Change your bandage as told by your doctor.       Leave stitches (sutures), skin glue, or skin tape (adhesive) strips in place. They may need to stay in place for 2 weeks or longer.  If tape strips get loose and curl up, you may trim the loose edges. Do not  remove tape strips completely unless your doctor says it is okay.       Make sure the surgery cuts are clean, dry, and protected.      Check your cut areas every day for signs of infection. Check for:       More redness, swelling, or pain.       More fluid or blood.       Warmth.       Pus or a bad smell.      If cuts were made in your legs:       Avoid crossing your legs.       Avoid sitting for long periods of time. Change positions every 30 minutes.       Raise (elevate) your legs when you are sitting.     Bathing        Do not  take baths, swim, or use a hot tub until your doctor says it is okay.       Only take sponge baths. Pat the surgery cuts dry. Do not  rub the cuts to dry.       Ask your doctor when you can shower.     Eating and drinking            Eat foods that are high in fiber, such as beans, nuts, whole grains, and raw fruits and vegetables. Any meats you eat should be lean cut. Avoid canned, processed, and fried foods. This can help prevent trouble pooping. This is also a part of a heart-healthy diet.       Drink enough fluid to keep your pee (urine) pale yellow.      Do not  drink alcohol until you are fully recovered. Ask your doctor when it is safe to drink alcohol.       Activity        Rest and limit your activity as told by your doctor. You may be told to:       Stop any activity right away if you have chest pain, shortness of breath, irregular heartbeats, or dizziness. Get help right away if you have any of these symptoms.       Move around often for short periods or take short walks as told by your doctor. Slowly increase your activities.       Avoid lifting, pushing, or pulling anything that is heavier than 10 lb (4.5 kg) for at least 6 weeks or as told by your doctor.      Do physical therapy or a cardiac rehab (cardiac rehabilitation) program as told by your doctor.       Physical therapy involves doing exercises  "to maintain movement and build strength and endurance.      A cardiac rehab program includes:       Exercise training.       Education.       Counseling.      Do not  drive until your doctor says it is okay.       Ask your doctor when you can go back to work.       Ask your doctor when you can be sexually active.     General instructions        Do not  drive or use heavy machinery while taking prescription pain medicine.      Do not  use any products that contain nicotine or tobacco. These include cigarettes, e-cigarettes, and chewing tobacco. If you need help quitting, ask your doctor.       Take 2?3 deep breaths every few hours during the day while you get better. This helps expand your lungs and prevent problems.       If you were given a device called an incentive spirometer, use it several times a day to practice deep breathing. Support your chest with a pillow or your arms when you take deep breaths or cough.       Wear compression stockings as told by your doctor.       Weigh yourself every day. This helps to see if your body is holding (retaining) fluid that may make your heart and lungs work harder.       Keep all follow-up visits as told by your doctor. This is important.       Contact a doctor if:         You have more redness, swelling, or pain around any cut.       You have more fluid or blood coming from any cut.       Any cut feels warm to the touch.       You have pus or a bad smell coming from any cut.       You have a fever.       You have swelling in your ankles or legs.       You have pain in your legs.       You gain 2 lb (0.9 kg) or more a day.       You feel sick to your stomach or you throw up (vomit).       You have watery poop (diarrhea).     Get help right away if:         You have chest pain that goes to your jaw or arms.       You are short of breath.       You have a fast or irregular heartbeat.       You notice a \"clicking\" in your breastbone (sternum) when you move.      You have any " "signs of a stroke. \"BE FAST\"  is an easy way to remember the main warning signs:      B - Balance.  Signs are dizziness, sudden trouble walking, or loss of balance.      E - Eyes.  Signs are trouble seeing or a change in how you see.      F - Face.  Signs are sudden weakness or loss of feeling of the face, or the face or eyelid drooping on one side.      A - Arms.  Signs are weakness or loss of feeling in an arm. This happens suddenly and usually on one side of the body.      S - Speech.  Signs are sudden trouble speaking, slurred speech, or trouble understanding what people say.      T - Time.  Time to call emergency services. Write down what time symptoms started.      You have other signs of a stroke, such as:       A sudden, very bad headache with no known cause.       Feeling sick to your stomach.       Throwing up.       Jerky movements you cannot control (seizure).     These symptoms may be an emergency. Do not wait to see if the symptoms will go away. Get medical help right away. Call your local emergency services (911 in the [place] Do not drive yourself to the hospital.   Summary         After the procedure, it is common to have pain or discomfort in the cuts from surgery (incisions).      Do not  take baths, swim, or use a hot tub until your doctor says it is okay.       Slowly increase your activities. You may need physical therapy or cardiac rehab.       Weigh yourself every day. This helps to see if your body is holding fluid.     This information is not intended to replace advice given to you by your health care provider. Make sure you discuss any questions you have with your health care provider.     Document Released: 12/23/2014Document Revised: 08/27/2019Document Reviewed: 08/27/2019     Elsevier Patient Education ? 2021 [employer]         Coronary Artery Disease, Male     Coronary artery disease (CAD) is a condition in which the arteries that lead to the heart (coronary arteries) become narrow " or blocked. The narrowing or blockage can lead to decreased blood flow to the heart. Prolonged reduced blood flow can cause a heart attack (myocardial infarction or MI). This condition may also be called coronary heart disease.   Because CAD is the leading cause of death in men, it is important to understand what causes this condition and how it is treated.   What are the causes?       CAD is most often caused by atherosclerosis. This is the buildup of fat and cholesterol (plaque) on the inside of the arteries. Over time, the plaque may narrow or block the artery, reducing blood flow to the heart. Plaque can also become weak and break off within a coronary artery and cause a sudden blockage. Other less common causes of CAD include:       A blood clot or a piece of a blood clot or other substance that blocks the flow of blood in a coronary artery (embolism).       A tearing of the artery (spontaneous coronary artery dissection).       An enlargement of an artery (aneurysm).       Inflammation (vasculitis) in the artery wall.       What increases the risk?    The following factors may make you more likely to develop this condition:       Age. Men over age 45 are at a greater risk of CAD.       Family history of CAD.       Gender. Men often develop CAD earlier in life than women.       High blood pressure (hypertension).       Diabetes.       High cholesterol levels.       Tobacco use.       Excessive alcohol use.       Lack of exercise.       A diet high in saturated and trans fats, such as fried food and processed meat.      Other possible risk factors include:       High stress levels.       Depression.       Obesity.       Sleep apnea.     What are the signs or symptoms?    Many people do not have any symptoms during the early stages of CAD. As the condition progresses, symptoms may include:      Chest pain (angina). The pain can:       Feel like crushing or squeezing, or like a tightness, pressure, fullness, or  heaviness in the chest.       Last more than a few minutes or can stop and recur. The pain tends to get worse with exercise or stress and to fade with rest.       Pain in the arms, neck, jaw, ear, or back.       Unexplained heartburn or indigestion.       Shortness of breath.       Nausea or vomiting.       Sudden light-headedness.       Sudden cold sweats.       Fluttering or fast heartbeat (palpitations).     How is this diagnosed?    This condition is diagnosed based on:       Your family and medical history.       A physical exam.      Tests, including:       A test to check the electrical signals in your heart (electrocardiogram).       Exercise stress test. This looks for signs of blockage when the heart is stressed with exercise, such as running on a treadmill.       Pharmacologic stress test. This test looks for signs of blockage when the heart is being stressed with a medicine.       Blood tests.       Coronary angiogram. This is a procedure to look at the coronary arteries to see if there is any blockage. During this test, a dye is injected into your arteries so they appear on an X-ray.       Coronary artery CT scan. This CT scan helps detect calcium deposits in your coronary arteries. Calcium deposits are an indicator of CAD.       A test that uses sound waves to take a picture of your heart (echocardiogram).       Chest X-ray.     How is this treated?    This condition may be treated by:       Healthy lifestyle changes to reduce risk factors.      Medicines such as:       Antiplatelet medicines and blood-thinning medicines, such as aspirin. These help to prevent blood clots.       Nitroglycerin.       Blood pressure medicines.       Cholesterol-lowering medicine.       Coronary angioplasty and stenting. During this procedure, a thin, flexible tube is inserted through a blood vessel and into a blocked artery. A balloon or similar device on the end of the tube is inflated to open up the artery. In some  cases, a small, mesh tube (stent) is inserted into the artery to keep it open.       Coronary artery bypass surgery. During this surgery, veins or arteries from other parts of the body are used to create a bypass around the blockage and allow blood to reach your heart.     Follow these instructions at home:   Medicines         Take over-the-counter and prescription medicines only as told by your health care provider.      Do not  take the following medicines unless your health care provider approves:       NSAIDs, such as ibuprofen, naproxen, or celecoxib.       Vitamin supplements that contain vitamin A, vitamin E, or both.     Lifestyle         Follow an exercise program approved by your health care provider. Aim for 150 minutes of moderate exercise or 75 minutes of vigorous exercise each week.       Maintain a healthy weight or lose weight as approved by your health care provider.       Learn to manage stress or try to limit your stress. Ask your health care provider for suggestions if you need help.       Get screened for depression and seek treatment, if needed.      Do not  use any products that contain nicotine or tobacco, such as cigarettes, e-cigarettes, and chewing tobacco. If you need help quitting, ask your health care provider.      Do not  use illegal drugs.     Eating and drinking            Follow a heart-healthy diet. A dietitian can help educate you about healthy food options and changes. In general, eat plenty of fruits and vegetables, lean meats, and whole grains.      Avoid foods high in:       Sugar.       Salt (sodium).       Saturated fat, such as processed or fatty meat.       Trans fat, such as fried foods.       Use healthy cooking methods such as roasting, grilling, broiling, baking, poaching, steaming, or stir-frying.      Do not  drink alcohol if your health care provider tells you not to drink.      If you drink alcohol:       Limit how much you have to 0?2 drinks per day.       Be  aware of how much alcohol is in your drink. In the [place] one drink equals one 12 oz bottle of beer (355 mL), one 5 oz glass of wine (148 mL), or one 1? oz glass of hard liquor (44 mL).     General instructions         Manage any other health conditions, such as hypertension and diabetes. These conditions affect your heart.       Your health care provider may ask you to monitor your blood pressure. Ideally, your blood pressure should be below 130/80.       Keep all follow-up visits as told by your health care provider. This is important.       Get help right away if:        You have pain in your chest, neck, ear, arm, jaw, stomach, or back that:       Lasts more than a few minutes.       Is recurring.       Is not relieved by taking medicine under your tongue (sublingual nitroglycerin).       You have profuse sweating without cause.      You have unexplained:       Heartburn or indigestion.       Shortness of breath or difficulty breathing.       Fluttering or fast heartbeat (palpitations).       Nausea or vomiting.       Fatigue.       Feelings of nervousness or anxiety.       Weakness.       Diarrhea.       You have sudden light-headedness or dizziness.       You faint.       You feel like hurting yourself or think about taking your own life.     These symptoms may represent a serious problem that is an emergency. Do not wait to see if the symptoms will go away. Get medical help right away. Call your local emergency services (911 in the [place] Do not drive yourself to the hospital.   Summary         Coronary artery disease (CAD) is a condition in which the arteries that lead to the heart (coronary arteries) become narrow or blocked. The narrowing or blockage can lead to a heart attack.       Many people do not have any symptoms during the early stages of CAD.       CAD can be treated with lifestyle changes, medicines, surgery, or a combination of these treatments.     This information is not intended to replace  advice given to you by your health care provider. Make sure you discuss any questions you have with your health care provider.     Document Released: 07/15/2015Document Revised: 09/06/2019Document Reviewed: 08/27/2019     [employer] Patient Education ? 2021 [employer] Inc.         Cyclobenzaprine tablets     What is this medicine?   CYCLOBENZAPRINE (sye kloe LANDON za preen) is a muscle relaxer. It is used to treat muscle pain, spasms, and stiffness.   This medicine may be used for other purposes; ask your health care provider or pharmacist if you have questions.   COMMON BRAND NAME(S): Fexmid, Flexeril   What should I tell my health care provider before I take this medicine?   They need to know if you have any of these conditions:         heart disease, irregular heartbeat, or previous heart attack       liver disease       thyroid problem       an unusual or allergic reaction to cyclobenzaprine, tricyclic antidepressants, lactose, other medicines, foods, dyes, or preservatives       pregnant or trying to get pregnant       breast-feeding     How should I use this medicine?   Take this medicine by mouth with a glass of water. Follow the directions on the prescription label. If this medicine upsets your stomach, take it with food or milk. Take your medicine at regular intervals. Do not take it more often than directed.   Talk to your pediatrician regarding the use of this medicine in children. Special care may be needed.   Overdosage: If you think you have taken too much of this medicine contact a poison control center or emergency room at once.   NOTE: This medicine is only for you. Do not share this medicine with others.   What if I miss a dose?   If you miss a dose, take it as soon as you can. If it is almost time for your next dose, take only that dose. Do not take double or extra doses.   What may interact with this medicine?   Do not take this medicine with any of the following medications:         MAOIs like Carbex,  Eldepryl, Marplan, Nardil, and Parnate       narcotic medicines for cough       safinamide     This medicine may also interact with the following medications:         alcohol       bupropion       antihistamines for allergy, cough and cold       certain medicines for anxiety or sleep       certain medicines for bladder problems like oxybutynin, tolterodine       certain medicines for depression like amitriptyline, fluoxetine, sertraline       certain medicines for Parkinson's disease like benztropine, trihexyphenidyl       certain medicines for seizures like phenobarbital, primidone       certain medicines for stomach problems like dicyclomine, hyoscyamine       certain medicines for travel sickness like scopolamine       general anesthetics like halothane, isoflurane, methoxyflurane, propofol       ipratropium       local anesthetics like lidocaine, pramoxine, tetracaine       medicines that relax muscles for surgery       narcotic medicines for pain       phenothiazines like chlorpromazine, mesoridazine, prochlorperazine, thioridazine       verapamil     This list may not describe all possible interactions. Give your health care provider a list of all the medicines, herbs, non-prescription drugs, or dietary supplements you use. Also tell them if you smoke, drink alcohol, or use illegal drugs. Some items may interact with your medicine.   What should I watch for while using this medicine?   Tell your doctor or health care professional if your symptoms do not start to get better or if they get worse.   You may get drowsy or dizzy. Do not drive, use machinery, or do anything that needs mental alertness until you know how this medicine affects you. Do not stand or sit up quickly, especially if you are an older patient. This reduces the risk of dizzy or fainting spells. Alcohol may interfere with the effect of this medicine. Avoid alcoholic drinks.   If you are taking another medicine that also causes drowsiness, you  may have more side effects. Give your health care provider a list of all medicines you use. Your doctor will tell you how much medicine to take. Do not take more medicine than directed. Call emergency for help if you have problems breathing or unusual sleepiness.   Your mouth may get dry. Chewing sugarless gum or sucking hard candy, and drinking plenty of water may help. Contact your doctor if the problem does not go away or is severe.   What side effects may I notice from receiving this medicine?   Side effects that you should report to your doctor or health care professional as soon as possible:         allergic reactions like skin rash, itching or hives, swelling of the face, lips, or tongue       breathing problems       chest pain       fast, irregular heartbeat       hallucinations       seizures       unusually weak or tired     Side effects that usually do not require medical attention (report to your doctor or health care professional if they continue or are bothersome):         headache       nausea, vomiting     This list may not describe all possible side effects. Call your doctor for medical advice about side effects. You may report side effects to FDA at 2-543-FDA-7988.   Where should I keep my medicine?   Keep out of the reach of children.   Store at room temperature between 15 and 30 degrees C (59 and 86 degrees F). Keep container tightly closed. Throw away any unused medicine after the expiration date.   NOTE: This sheet is a summary. It may not cover all possible information. If you have questions about this medicine, talk to your doctor, pharmacist, or health care provider.     ? 2021 Elsevier/[employer] Standard (2019-11-20 12:49:26)         Furosemide Oral Tablets     What is this medicine?   FUROSEMIDE (fyoor [place] se mide) is a diuretic. It helps you make more urine and to lose salt and excess water from your body. It treats swelling from heart, kidney, or liver disease. It also treats high blood  pressure.   This medicine may be used for other purposes; ask your health care provider or pharmacist if you have questions.   COMMON BRAND NAME(S): Active-Medicated Specimen Kit, Delone, Diuscreen, Lasix, RX Specimen Collection Kit, Specimen Collection Kit, URINX Medicated Specimen Collection   What should I tell my health care provider before I take this medicine?   They need to know if you have any of these conditions:         abnormal blood electrolytes       diarrhea or vomiting       gout       heart disease       kidney disease, small amounts of urine, or difficulty passing urine       liver disease       thyroid disease       an unusual or allergic reaction to furosemide, sulfa drugs, other medicines, foods, dyes, or preservatives       pregnant or trying to get pregnant       breast-feeding     How should I use this medicine?   Take this drug by mouth. Take it as directed on the prescription label at the same time every day. You can take it with or without food. If it upsets your stomach, take it with food. Keep taking it unless your health care provider tells you to stop.   Talk to your health care provider about the use of this drug in children. Special care may be needed.   Overdosage: If you think you have taken too much of this medicine contact a poison control center or emergency room at once.   NOTE: This medicine is only for you. Do not share this medicine with others.   What if I miss a dose?   If you miss a dose, take it as soon as you can. If it is almost time for your next dose, take only that dose. Do not take double or extra doses.   What may interact with this medicine?         aspirin and aspirin-like medicines       certain antibiotics       chloral hydrate       cisplatin       cyclosporine       digoxin       diuretics       laxatives       lithium       medicines for blood pressure       medicines that relax muscles for surgery       methotrexate       NSAIDs, medicines for pain and  inflammation like ibuprofen, naproxen, or indomethacin       phenytoin       steroid medicines like prednisone or cortisone       sucralfate       thyroid hormones     This list may not describe all possible interactions. Give your health care provider a list of all the medicines, herbs, non-prescription drugs, or dietary supplements you use. Also tell them if you smoke, drink alcohol, or use illegal drugs. Some items may interact with your medicine.   What should I watch for while using this medicine?   Visit your doctor or health care providerfor regular checks on your progress. Check your blood pressure regularly. Ask your doctor or health care providerwhat your blood pressure should be, and when you should contact him or her. If you are a diabetic, check your blood sugar as directed.   This medicine may cause serious skin reactions. They can happen weeks to months after starting the medicine. Contact your health care provider right away if you notice fevers or flu-like symptoms with a rash. The rash may be red or purple and then turn into blisters or peeling of the skin. Or, you might notice a red rash with swelling of the face, lips or lymph nodes in your neck or under your arms.   You may need to be on a special diet while taking this medicine. Check with your doctor. Also, ask how many glasses of fluid you need to drink a day. You must not get dehydrated.   You may get drowsy or dizzy. Do not drive, use machinery, or do anything that needs mental alertness until you know how this drug affects you. Do not stand or sit up quickly, especially if you are an older patient. This reduces the risk of dizzy or fainting spells. Alcohol can make you more drowsy and dizzy. Avoid alcoholic drinks.   This medicine can make you more sensitive to the sun. Keep out of the sun. If you cannot avoid being in the sun, wear protective clothing and use sunscreen. Do not use sun lamps or tanning beds/booths.   What side effects may  I notice from receiving this medicine?   Side effects that you should report to your doctor or health care professional as soon as possible:         blood in urine or stools       dry mouth       fever or chills       hearing loss or ringing in the ears       irregular heartbeat       muscle pain or weakness, cramps       rash, fever, and swollen lymph nodes       redness, blistering, peeling or loosening of the skin, including inside the mouth       skin rash       stomach upset, pain, or nausea       tingling or numbness in the hands or feet       unusually weak or tired       vomiting or diarrhea       yellowing of the eyes or skin     Side effects that usually do not require medical attention (report to your doctor or health care professional if they continue or are bothersome):         headache       loss of appetite       unusual bleeding or bruising     This list may not describe all possible side effects. Call your doctor for medical advice about side effects. You may report side effects to FDA at 8-585-FDA-5086.   Where should I keep my medicine?   Keep out of the reach of children and pets.   Store at room temperature between 20 and 25 degrees C (68 and 77 degrees F). Protect from light and moisture. Keep the container tightly closed. Throw away any unused drug after the expiration date.   NOTE: This sheet is a summary. It may not cover all possible information. If you have questions about this medicine, talk to your doctor, pharmacist, or health care provider.     ? 2021 [employer] (2020-08-04 18:01:32)         Heart-Healthy Eating Plan     Many factors influence your heart (coronary) health, including eating and exercise habits. Coronary risk increases with abnormal blood fat (lipid) levels. Heart-healthy meal planning includes limiting unhealthy fats, increasing healthy fats, and making other diet and lifestyle changes.   What is my plan?    Your health care provider may recommend that you:        Limit your fat intake to _________% or less of your total calories each day.       Limit your saturated fat intake to _________% or less of your total calories each day.       Limit the amount of cholesterol in your diet to less than _________ mg per day.     What are tips for following this plan?   Cooking    Cook foods using methods other than frying. Baking, boiling, grilling, and broiling are all good options. Other ways to reduce fat include:       Removing the skin from poultry.       Removing all visible fats from meats.       Steaming vegetables in water or broth.     Meal planning           At meals, imagine dividing your plate into fourths:       Fill one-half of your plate with vegetables and green salads.       Fill one-fourth of your plate with whole grains.       Fill one-fourth of your plate with lean protein foods.       Eat 4?5 servings of vegetables per day. One serving equals 1 cup raw or cooked vegetable, or 2 cups raw leafy greens.       Eat 4?5 servings of fruit per day. One serving equals 1 medium whole fruit, ? cup dried fruit, ? cup fresh, frozen, or canned fruit, or ? cup 100% fruit juice.       Eat more foods that contain soluble fiber. Examples include apples, broccoli, carrots, beans, peas, and barley. Aim to get 25?30 g of fiber per day.       Increase your consumption of legumes, nuts, and seeds to 4?5 servings per week. One serving of dried beans or legumes equals ? cup cooked, 1 serving of nuts is ? cup, and 1 serving of seeds equals 1 tablespoon.     Fats         Choose healthy fats more often. Choose monounsaturated and polyunsaturated fats, such as olive and canola oils, flaxseeds, walnuts, almonds, and seeds.       Eat more omega-3 fats. Choose salmon, mackerel, sardines, tuna, flaxseed oil, and ground flaxseeds. Aim to eat fish at least 2 times each week.       Check food labels carefully to identify foods with trans fats or high amounts of saturated fat.       Limit  saturated fats. These are found in animal products, such as meats, butter, and cream. Plant sources of saturated fats include palm oil, palm kernel oil, and coconut oil.       Avoid foods with partially hydrogenated oils in them. These contain trans fats. Examples are stick margarine, some tub margarines, cookies, crackers, and other baked goods.       Avoid fried foods.     General information         Eat more home-cooked food and less restaurant, buffet, and fast food.       Limit or avoid alcohol.       Limit foods that are high in starch and sugar.       Lose weight if you are overweight. Losing just 5?10% of your body weight can help your overall health and prevent diseases such as diabetes and heart disease.       Monitor your salt (sodium) intake, especially if you have high blood pressure. Talk with your health care provider about your sodium intake.       Try to incorporate more vegetarian meals weekly.       What foods can I eat?   Fruits   All fresh, canned (in natural juice), or frozen fruits.   Vegetables   Fresh or frozen vegetables (raw, steamed, roasted, or grilled). Green salads.   Grains   Most grains. Choose whole wheat and whole grains most of the time. Rice and pasta, including brown rice and pastas made with whole wheat.   Meats and other proteins   Lean, well-trimmed beef, veal, pork, and lamb. Chicken and turkey without skin. All fish and shellfish. Wild duck, rabbit, pheasant, and venison. Egg whites or low-cholesterol egg substitutes. Dried beans, peas, lentils, and tofu. Seeds and most nuts.   Dairy   Low-fat or nonfat cheeses, including ricotta and mozzarella. Skim or 1% milk (liquid, powdered, or evaporated). Buttermilk made with low-fat milk. Nonfat or low-fat yogurt.   Fats and oils   Non-hydrogenated (trans-free) margarines. Vegetable oils, including soybean, sesame, sunflower, olive, peanut, safflower, corn, canola, and cottonseed. Salad dressings or mayonnaise made with a vegetable  oil.   Beverages   Water (mineral or sparkling). Coffee and tea. Diet carbonated beverages.   Sweets and desserts   Sherbet, gelatin, and fruit ice. Small amounts of dark chocolate.   Limit all sweets and desserts.   Seasonings and condiments   All seasonings and condiments.   The items listed above may not be a complete list of foods and beverages you can eat. Contact a dietitian for more options.   What foods are not recommended?   Fruits   Canned fruit in heavy syrup. Fruit in cream or butter sauce. Fried fruit. Limit coconut.   Vegetables   Vegetables cooked in cheese, cream, or butter sauce. Fried vegetables.   Grains   Breads made with saturated or trans fats, oils, or whole milk. Croissants. Sweet rolls. Donuts. High-fat crackers, such as cheese crackers.   Meats and other proteins   Fatty meats, such as hot dogs, ribs, sausage, hay, rib-eye roast or steak. High-fat deli meats, such as salami and bologna. Caviar. Domestic duck and goose. Organ meats, such as liver.   Dairy   Cream, sour cream, cream cheese, and creamed cottage cheese. Whole milk cheeses. Whole or 2% milk (liquid, evaporated, or condensed). Whole buttermilk. Cream sauce or high-fat cheese sauce. Whole-milk yogurt.   Fats and oils   Meat fat, or shortening. Cocoa butter, hydrogenated oils, palm oil, coconut oil, palm kernel oil. Solid fats and shortenings, including hay fat, salt pork, lard, and butter. Nondairy cream substitutes. Salad dressings with cheese or sour cream.   Beverages   Regular sodas and any drinks with added sugar.   Sweets and desserts   Frosting. Pudding. Cookies. Cakes. Pies. Milk chocolate or white chocolate. Buttered syrups. Full-fat ice cream or ice cream drinks.   The items listed above may not be a complete list of foods and beverages to avoid. Contact a dietitian for more information.   Summary         Heart-healthy meal planning includes limiting unhealthy fats, increasing healthy fats, and making other diet and  lifestyle changes.       Lose weight if you are overweight. Losing just 5?10% of your body weight can help your overall health and prevent diseases such as diabetes and heart disease.       Focus on eating a balance of foods, including fruits and vegetables, low-fat or nonfat dairy, lean protein, nuts and legumes, whole grains, and heart-healthy oils and fats.     This information is not intended to replace advice given to you by your health care provider. Make sure you discuss any questions you have with your health care provider.     Document Released: 09/26/2009Document Revised: 01/25/2019Document Reviewed: 01/25/2019     [employer] Patient Education ? 2021 [employer]         Incision Care, Adult     An incision is a surgical cut that is made through your skin. Most incisions are closed after a surgical procedure. Your incision may be closed with stitches (sutures), staples, skin glue, or adhesive strips. You may need to return to your health care provider to have sutures or staples removed. This may occur several days or several weeks after your surgery. Until then, the incision needs to be cared for properly to prevent infection. Follow instructions from your health care provider about how to care for your incision.   Supplies needed:         Soap, water, and a clean hand towel.       Wound cleanser.       A clean bandage (dressing), if needed.       Cream or ointment, if told by your health care provider.       Clean gauze.     How to care for your incision   Cleaning the incision    Ask your health care provider how to clean the incision. This may include:       Using mild soap and water, or wound cleanser.       Using a clean gauze to pat the incision dry after cleaning it.     Dressing changes         Wash your hands with soap and water for at least 20 seconds before and after you change the dressing. If soap and water are not available, use hand .       Change your dressing as told by your health  care provider.       Leave sutures, staples, skin glue, or adhesive strips in place. These skin closures may need to stay in place for 2 weeks or longer. If adhesive strip edges start to loosen and curl up, you may trim the loose edges. Do not  remove adhesive strips completely unless your health care provider tells you to do that.       Apply cream or ointment. Do this only as told by your health care provider.       Cover the incision with a clean dressing. Ask your health care provider when you can begin leaving the incision uncovered.     Checking for infection       Check your incision area every day for signs of infection. Check for:       More redness, swelling, or pain.       More fluid or blood.       Warmth.       Pus or a bad smell.       Follow these instructions at home   Medicines         Take over-the-counter and prescription medicines only as told by your health care provider.       If you were prescribed an antibiotic medicine, cream, or ointment, take or apply it as told by your health care provider. Do not  stop using the antibiotic even if your condition improves.     Eating and drinking        Eat a diet that includes protein, vitamin A, vitamin C, and other nutrient-rich foods to help the wound heal.       Foods rich in protein include meat, fish, eggs, dairy, beans, and nuts.       Foods rich in vitamin A include carrots and dark green, leafy vegetables.       Foods rich in vitamin C include citrus fruits, tomatoes, broccoli, and peppers.       Drink enough fluid to keep your urine pale yellow.     General instructions           Do not  take baths, swim, use a hot tub, or do anything that would put the incision underwater until your health care provider approves. Ask your health care provider if you may take showers. You may only be allowed to take sponge baths.      Limit movement around your incision to promote healing.       Avoid straining, lifting, or exercising for the first 2 weeks  after your procedure, or for as long as told by your health care provider.       Return to your normal activities as told by your health care provider. Ask your health care provider what activities are safe for you.      Do not  scratch or pick at the incision. Keep it covered as told by your health care provider.       Protect your incision from the sun when you are outside for the first 6 months, or for as long as told by your health care provider. Cover up the scar area or apply sunscreen that has an SPF of at least 30.      Do not  use any products that contain nicotine or tobacco, such as cigarettes, e-cigarettes, and chewing tobacco. These can delay incision healing after surgery. If you need help quitting, ask your health care provider.       Keep all follow-up visits as told by your health care provider. This is important.       Contact a health care provider if:        You have any of these signs of infection:       More redness, swelling, or pain around your incision.       More fluid or blood coming from your incision.       Warmth coming from your incision.       Pus or a bad smell coming from your incision.       A fever.       You are nauseous or you vomit.       You are dizzy.       Your sutures, staples, skin glue, or adhesive strips come undone.     Get help right away if:         You have a red streak on the skin near your incision.       Your incision bleeds through the dressing and the bleeding does not stop with gentle pressure.       The edges of your incision open up and separate.      You have signs of a serious bodily reaction to an infection. These signs may include:       Fever, shaking chills, or feeling very cold.       Confusion or anxiety.       Severe pain.       Trouble breathing.       Fast heartbeat.       Clammy or sweaty skin.       A rash.     These symptoms may represent a serious problem that is an emergency. Do not wait to see if the symptoms will go away. Get medical help  right away. Call your local emergency services (911 in the [place] Do not drive yourself to the hospital.   Summary         Follow instructions from your health care provider about how to care for your incision.       Wash your hands with soap and water for at least 20 seconds before and after you change the dressing. If soap and water are not available, use hand .       Check your incision area every day for signs of infection.       Keep all follow-up visits as told by your health care provider. This is important.     This information is not intended to replace advice given to you by your health care provider. Make sure you discuss any questions you have with your health care provider.     Document Released: 07/07/2006Document Revised: 10/07/2020Document Reviewed: 10/07/2020     [employer] Patient Education ? 2021 [employer]         Iron Deficiency Anemia, Adult     Iron deficiency anemia is when you do not have enough red blood cells or hemoglobin in your blood. This happens because you have too little iron in your body. Hemoglobin carries oxygen to parts of the body. Anemia can cause your body to not get enough oxygen.   What are the causes?         Not eating enough foods that have iron in them.       The body not being able to take in iron well.       Needing more iron due to pregnancy or heavy menstrual periods, for females.       Cancer.       Bleeding in the bowels.       Many blood draws.     What increases the risk?         Being pregnant.       Being a teenage girl going through a growth spurt.     What are the signs or symptoms?         Pale skin, lips, and nails.       Weakness, dizziness, and getting tired easily.       Headache.       Feeling like you cannot breathe well when moving (shortness of breath).       Cold hands and feet.       Fast heartbeat or a heartbeat that is not regular.       Feeling grouchy (irritable) or breathing fast. These are more common in very bad anemia.     Mild  anemia may not cause any symptoms.   How is this treated?    This condition is treated by finding out why you do not have enough iron and then getting more iron. It may include:       Adding foods to your diet that have a lot of iron.       Taking iron pills (supplements). If you are pregnant or breastfeeding, you may need to take extra iron. Your diet often does not provide the amount of iron that you need.       Getting more vitamin C in your diet. Vitamin C helps your body take in iron. You may need to take iron pills with a glass of orange juice or vitamin C pills.       Medicines to make heavy menstrual periods lighter.       Surgery.     You may need blood tests to see if treatment is working. If the treatment does not seem to be working, you may need more tests.   Follow these instructions at home:   Medicines        Take over-the-counter and prescription medicines only as told by your doctor. This includes iron pills and vitamins.       Take iron pills when your stomach is empty. If you cannot handle this, take them with food.      Do not  drink milk or take antacids at the same time as your iron pills.       Iron pills may turn your poop (stool)black.      If you cannot handle taking iron pills by mouth, ask your doctor about getting iron through:       An IV tube.       A shot (injection) into a muscle.     Eating and drinking           Talk with your doctor before changing the foods you eat. He or she may tell you to eat foods that have a lot of iron, such as:       Liver.       Low-fat (lean) beef.       Breads and cereals that have iron added to them.       Eggs.       Dried fruit.       Dark green, leafy vegetables.      Eat fresh fruits and vegetables that are high in vitamin C. They help your body use iron. Foods with a lot of vitamin C include:       Oranges.       Peppers.       Tomatoes.       Mangoes.       Drink enough fluid to keep your pee (urine) pale yellow.     Managing constipation    If  you are taking iron pills, they may cause trouble pooping (constipation). To prevent or treat trouble pooping, you may need to:       Take over-the-counter or prescription medicines.       Eat foods that are high in fiber. These include beans, whole grains, and fresh fruits and vegetables.       Limit foods that are high in fat and sugar. These include fried or sweet foods.     General instructions         Return to your normal activities as told by your doctor. Ask your doctor what activities are safe for you.       Keep yourself clean, and keep things clean around you.       Keep all follow-up visits as told by your doctor. This is important.       Contact a doctor if:         You feel like you may vomit (nauseous), or you vomit.       You feel weak.       You are sweating for no reason.      You have trouble pooping, such as:       Pooping less than 3 times a week.       Straining to poop.       Having poop that is hard, dry, or larger than normal.       Feeling full or bloated.       Pain in the lower belly.       Not feeling better after pooping.     Get help right away if:         You pass out (faint).       You have chest pain.      You have trouble breathing that:       Is very bad.       Gets worse with physical activity.       You have a fast heartbeat, or a heartbeat that does not feel regular.       You get light-headed when getting up from sitting or lying down.     These symptoms may be an emergency. Do not wait to see if the symptoms will go away. Get medical help right away. Call your local emergency services (911 in the [place] Do not drive yourself to the hospital.   Summary         Iron deficiency anemia is when you have too little iron in your body.       This condition is treated by finding out why you do not have enough iron in your body and then getting more iron.       Take over-the-counter and prescription medicines only as told by your doctor.       Eat fresh fruits and vegetables that are  high in vitamin C.       Get help right away if you cannot breathe well.     This information is not intended to replace advice given to you by your health care provider. Make sure you discuss any questions you have with your health care provider.     Document Released: 01/20/2012Document Revised: 08/25/2020Document Reviewed: 08/25/2020     [employer] Patient Education ? 2021 [employer]         Metoprolol Tablets     What is this medicine?   METOPROLOL (me TOE proe lole) is a beta blocker. It decreases the amount of work your heart has to do and helps your heart beat regularly. It is used to treat high blood pressure and/or prevent chest pain (also called angina). It is also used after a heart attack to prevent a second one.   This medicine may be used for other purposes; ask your health care provider or pharmacist if you have questions.   COMMON BRAND NAME(S): Lopressor   What should I tell my health care provider before I take this medicine?   They need to know if you have any of these conditions:         diabetes       heart or vessel disease like slow heart rate, worsening heart failure, heart block, sick sinus syndrome or Raynaud's disease       kidney disease       liver disease       lung or breathing disease, like asthma or emphysema       pheochromocytoma       thyroid disease       an unusual or allergic reaction to metoprolol, other beta-blockers, medicines, foods, dyes, or preservatives       pregnant or trying to get pregnant       breast-feeding     How should I use this medicine?   Take this drug by mouth with water. Take it as directed on the prescription label at the same time every day. You can take it with or without food. You should always take it the same way. Keep taking it unless your health care provider tells you to stop.   Talk to your health care provider about the use of this drug in children. Special care may be needed.   Overdosage: If you think you have taken too much of this medicine  contact a poison control center or emergency room at once.   NOTE: This medicine is only for you. Do not share this medicine with others.   What if I miss a dose?   If you miss a dose, take it as soon as you can. If it is almost time for your next dose, take only that dose. Do not take double or extra doses.   What may interact with this medicine?   This medicine may interact with the following medications:         certain medicines for blood pressure, heart disease, irregular heart beat       certain medicines for depression like monoamine oxidase (MAO) inhibitors, fluoxetine, or paroxetine       clonidine       dobutamine       epinephrine       isoproterenol       reserpine     This list may not describe all possible interactions. Give your health care provider a list of all the medicines, herbs, non-prescription drugs, or dietary supplements you use. Also tell them if you smoke, drink alcohol, or use illegal drugs. Some items may interact with your medicine.   What should I watch for while using this medicine?   Visit your doctor or health care professional for regular check ups. Contact your doctor right away if your symptoms worsen. Check your blood pressure and pulse rate regularly. Ask your health care professional what your blood pressure and pulse rate should be, and when you should contact them.   You may get drowsy or dizzy. Do not drive, use machinery, or do anything that needs mental alertness until you know how this medicine affects you. Do not sit or stand up quickly, especially if you are an older patient. This reduces the risk of dizzy or fainting spells. Contact your doctor if these symptoms continue. Alcohol may interfere with the effect of this medicine. Avoid alcoholic drinks.   This medicine may increase blood sugar. Ask your healthcare provider if changes in diet or medicines are needed if you have diabetes.   What side effects may I notice from receiving this medicine?   Side effects that  you should report to your doctor or health care professional as soon as possible:         allergic reactions like skin rash, itching or hives       cold or numb hands or feet       depression       difficulty breathing       faint       fever with sore throat       irregular heartbeat, chest pain       rapid weight gain       signs and symptoms of high blood sugar such as being more thirsty or hungry or having to urinate more than normal. You may also feel very tired or have blurry vision.       swollen legs or ankles     Side effects that usually do not require medical attention (report to your doctor or health care professional if they continue or are bothersome):         anxiety or nervousness       change in sex drive or performance       dry skin       headache       nightmares or trouble sleeping       short term memory loss       stomach upset or diarrhea     This list may not describe all possible side effects. Call your doctor for medical advice about side effects. You may report side effects to FDA at 2-556-FDA-3307.   Where should I keep my medicine?   Keep out of the reach of children and pets.   Store at room temperature between 15 and 30 degrees C (59 and 86 degrees F). Protect from moisture. Keep the container tightly closed. Throw away any unused drug after the expiration date.   NOTE: This sheet is a summary. It may not cover all possible information. If you have questions about this medicine, talk to your doctor, pharmacist, or health care provider.     ? 2021 Elsevier/Gold Standard (2020-07-30 17:21:17)

## 2022-03-07 NOTE — CASE MANAGEMENT/SOCIAL WORK
Discharge Planning Assessment  [facility]     Patient Name: Pierce Morse  MRN: [id number]  Today's Date: 3/7/2022    Admit Date: 3/2/2022     Discharge Needs Assessment     Row Name 03/07/22 1343       Living Environment    People in Home parent(s)    Name(s) of People in Home Frances Morse mom [phone]    Current Living Arrangements home    Primary Care Provided by self    Provides Primary Care For no one    Family Caregiver if Needed parent(s)    Family Caregiver Names Frances boggs [phone]    Quality of Family Relationships unable to assess    Able to Return to Prior Arrangements no       Resource/Environmental Concerns    Resource/Environmental Concerns none    Transportation Concerns none       Transition Planning    Patient/Family Anticipates Transition to home with family    Patient/Family Anticipated Services at Transition home health care    Transportation Anticipated family or friend will provide       Discharge Needs Assessment    Readmission Within the Last 30 Days no previous admission in last 30 days    Equipment Currently Used at Home glucometer    Concerns to be Addressed discharge planning    Anticipated Changes Related to Illness inability to care for self    Discharge Facility/Level of Care Needs home with home health    Provided Post Acute Provider List? N/A    N/A Provider List Comment Requested [facility]    Current Discharge Risk lives alone               Discharge Plan     Row Name 03/07/22 1349       Plan    Plan To parents home with [facility]    Patient/Family in Agreement with Plan yes    Plan Comments IMM 3/7/2022.  Met with patient at bedside. Face sheet verified. Prior to admission patient was independent with all aspects of his ADL’s. He denies using any adaptive equipment. He has a glucometer. Patient uses the [facility] on [address], denies any issues affording or taking his medications. He is agreeable to using [facility].  Patient’s brother Eddie is his  health care surrogate. Discharge plans discussed, plan is to stay with his parents at [address] [place] [address]. Patient requested [facility] spoke with Lidia they can accept. Family will transport. Will continue to monitor for new or changing discharge needs.  Kailey Correa RN [facility]              Continued Care and Services - Admitted Since 3/2/2022     Home Medical Care     Service Provider Request Status Selected Services Address Phone Fax Patient Preferred     [facility]  Accepted N/A [address] [address] [phone] --              Expected Discharge Date and Time     Expected Discharge Date Expected Discharge Time    Mar 7, 2022          Demographic Summary     Row Name 03/07/22 1342       General Information    Admission Type inpatient    Arrived From home    Required Notices Provided Important Message from Medicare    Referral Source admission list    Reason for Consult discharge planning    Preferred Language English       Contact Information    Permission Granted to Share Info With family/designee  [facility] [facility] [place] [phone]               Functional Status     Row Name 03/07/22 1343       Functional Status    Usual Activity Tolerance good    Current Activity Tolerance moderate       Functional Status, IADL    Medications independent    Meal Preparation assistive person    Housekeeping assistive person    Laundry assistive person    Shopping assistive person       Mental Status    General Appearance WDL WDL;appearance    General Appearance unkempt;unshaven       Mental Status Summary    Recent Changes in Mental Status/Cognitive Functioning no changes       Employment/    Employment Status disabled               Psychosocial    No documentation.                Abuse/Neglect    No documentation.                Legal    No documentation.                Substance Abuse    No documentation.                Patient Forms    No  documentation.                   Kailey Correa RN

## 2022-03-07 NOTE — DISCHARGE SUMMARY
Date of Admission: 3/2/2022  Date of Discharge:  3/7/2022    Discharge Diagnosis:   - Severe multivessel CAD-- s/p CABGx7 LIMA/LSVG  - DM II  - CVA with peripheral vision disturbance   - Cerebral palsy with left sided weakness   - Non-healing right foot ulceration    Presenting Problem/History of Present Illness:  Coronary artery disease of native heart with stable angina pectoris, unspecified vessel or lesion type (HCC) [I25.118]     Hospital Course:  Mr. Morse is a very pleasant 57 year-old gentleman who underwent a CABG x7, APARICIO/LSVG with Dr. Hewitt on 3/2/2022. Post-operatively he overall recovered well. He was extubated the day of surgery and later transferred to stepdown. He did experience issues with hyperglycemia post-op -- medication regimen adjusted for better control. POD#4 he was noted to have a Hgb of 7.8 -- Iron supplement was added -- follow up Hgb with home health in 5 days.     Central line, manzo, epicardial wires, and chest tubes were removed in normal fashion without difficulty/complication.   Mr. Morse was weaned from oxygen and is tolerating current medication regimen. He is voiding and defecating without issue and is eating/drinking sufficiently.   On POD#5 patient was deemed appropriate for discharge home with family and home health.  Sternal precautions along with signs/symptoms of sternal wound infection were reviewed -- verbalized understanding.    Patient to follow up in our office:  Wednesday 3/30/2022 at 9:30 a.m.  Procedures Performed  Procedure(s):  CORONARY ARTERY BYPASS GRAFT X7, WITH LEFT INTERNAL MAMMARY HARVEST, MIDLINE STERNOTOMY,  INTRAOP MARIELA, PRP       Consults:   Consults     Date and Time Order Name Status Description    3/5/2022  5:26 AM Inpatient Cardiology Consult Completed     3/3/2022  9:55 PM Inpatient Hospitalist Consult Completed     3/3/2022  7:27 AM Inpatient Vascular Surgery Consult Completed           Pertinent Test Results:    Lab Results   Component Value  Date    WBC 8.85 03/07/2022    HGB 7.8 (L) 03/07/2022    HCT 22.8 (L) 03/07/2022    MCV 88.4 03/07/2022     03/07/2022      Lab Results   Component Value Date    GLUCOSE 67 03/07/2022    CALCIUM 8.2 (L) 03/07/2022     03/07/2022    K 4.3 03/07/2022    CO2 25.0 03/07/2022     03/07/2022    BUN 31 (H) 03/07/2022    CREATININE 0.91 03/07/2022    BCR 34.1 (H) 03/07/2022    ANIONGAP 8.0 03/07/2022     Lab Results   Component Value Date    INR 1.18 (H) 03/03/2022    PROTIME 15.0 (H) 03/03/2022       Condition on Discharge:  Stable     Vital Signs  Temp:  [98.1 °F (36.7 °C)-98.5 °F (36.9 °C)] 98.5 °F (36.9 °C)  Heart Rate:  [88-96] 92  Resp:  [18] 18  BP: (113-137)/(65-82) 129/66      Discharge Disposition  Home or Self Care -- home with family & HH    Discharge Medications     Discharge Medications      New Medications      Instructions Start Date   acetaminophen 325 MG tablet  Commonly known as: TYLENOL   650 mg, Oral, Every 4 Hours PRN      cyclobenzaprine 10 MG tablet  Commonly known as: FLEXERIL   10 mg, Oral, Every 8 Hours PRN      furosemide 40 MG tablet  Commonly known as: LASIX   40 mg, Oral, Daily   Start Date: March 8, 2022     iron polysaccharides 150 MG capsule  Commonly known as: NIFEREX   150 mg, Oral, Daily   Start Date: March 8, 2022     metoprolol succinate XL 25 MG 24 hr tablet  Commonly known as: TOPROL-XL   25 mg, Oral, Nightly      potassium chloride 20 MEQ CR tablet  Commonly known as: K-DUR,KLOR-CON   20 mEq, Oral, As Needed         Continue These Medications      Instructions Start Date   aspirin 81 MG EC tablet   81 mg, Oral, Daily, PT HOLDING FOR SURGERY      atorvastatin 40 MG tablet  Commonly known as: LIPITOR   40 mg, Oral, Nightly      FLUoxetine 20 MG capsule  Commonly known as: PROzac   20 mg, Oral, Nightly      Lantus 100 UNIT/ML injection  Generic drug: insulin glargine   20 Units, Subcutaneous, 2 Times Daily      metFORMIN 500 MG tablet  Commonly known as: GLUCOPHAGE    500 mg, Oral, 2 Times Daily With Meals      NovoLOG FlexPen 100 UNIT/ML solution pen-injector sc pen  Generic drug: insulin aspart   10 Units, Subcutaneous, 3 Times Daily With Meals         Stop These Medications    amLODIPine 10 MG tablet  Commonly known as: NORVASC     amLODIPine 5 MG tablet  Commonly known as: NORVASC     BUPIVACAINE 0.5% IN DEXTROSE 50% INJECTION - PODIATRY     carvedilol 6.25 MG tablet  Commonly known as: COREG     chlorhexidine 0.12 % solution  Commonly known as: PERIDEX     lisinopril 40 MG tablet  Commonly known as: PRINIVIL,ZESTRIL     mupirocin 2 % nasal ointment  Commonly known as: BACTROBAN     sulfamethoxazole-trimethoprim 800-160 MG per tablet  Commonly known as: Bactrim DS            Discharge Diet:  Heart Healthy    Activity at Discharge:  - No driving for 2 weeks -- ride in backseat of vehicle if a passenger.  - Do not drive if on narcotic pain medications.  - Shower daily: clean incisions with warm water and antibacterial soap only. Gently pat dry. Do not put any lotion or ointments on incisions.  - Ambulate for 10 minutes at least 3 times a day.  - Do not lift >10lbs for 6 weeks or until seen in office and advised otherwise.  - Take all medications as prescribed.    Future Appointments   Date Time Provider Department Center   3/30/2022  9:30 AM Juliana Mendez APRN [facility]     Additional Instructions for the Follow-ups that You Need to Schedule     Ambulatory Referral to Cardiac Rehab   As directed      Ambulatory Referral to Home Health   As directed      Please check Hgb level in 5 days -- 3/12/2022    Order Comments: Please check Hgb level in 5 days -- 3/12/2022     Face to Face Visit Date: 3/7/2022    Follow-up provider for Plan of Care?: I treated the patient in an acute care facility and will not continue treatment after discharge.    Follow-up provider: JR CASSIA PHILLIPS [[id number]]    Reason/Clinical Findings: Post-op weakness    Describe mobility limitations that  make leaving home difficult: Post-op weakness    Nursing/Therapeutic Services Requested: Skilled Nursing    Skilled nursing orders: Post CABG care Other    Frequency: 1 Week 1         Call MD With Problems / Concerns   As directed      Instructions:  Call office at [phone] for any drainage, increased redness, or fever over 100.5    Order Comments: Instructions:  Call office at [phone] for any drainage, increased redness, or fever over 100.5          Discharge Follow-up with PCP   As directed       Currently Documented PCP:    Farhad Mark MD    PCP Phone Number:    [phone]     Follow Up Details: in 1 week         Discharge Follow-up with Specialty: Cardiologist APRN/PA; 1 Week   As directed      Specialty: Cardiologist APRN/PA    Follow Up: 1 Week    Follow Up Details: bring all prescription bottles to appointment, call for appointment         Discharge Follow-up with Specified Provider: Cardiologist; 1 Month   As directed      To: Cardiologist    Follow Up: 1 Month    Follow Up Details: call for appointment, bring all medication bottles to appointment         Discharge Follow-up with Specified Provider: Cardiovascular Surgery APRN; 1 Month   As directed      To: Cardiovascular Surgery APRN    Follow Up: 1 Month    Follow Up Details: Wednesday 3/30 at 9:30 a.m., bring all current medications to appointment         Hemoglobin    Mar 12, 2022 (Approximate)      Release to patient: Immediate               Thank you for allowing us to participate in your plan of care.      TAY Stafford  03/07/22  12:12 EST  ..Electronically signed by TAY Stafford, 03/07/22, 1:45 PM EST.

## 2022-03-07 NOTE — PROGRESS NOTES
Patient is discharging today . Patient agreed to home health and PLEASE NOTE WILL BE AT PARENTS HOME - [address] [place] [address] . Ok to use patients pdxf- [phone].  Noted will need HGB checked on 3/12/22. Orders in Epic. Thank you!

## 2022-03-07 NOTE — CASE MANAGEMENT/SOCIAL WORK
Case Management Discharge Note      Final Note: Discharge to parents home and [facility]    Provided Post Acute Provider List?: N/A  N/A Provider List Comment: Requested [facility]    Selected Continued Care - Discharged on 3/7/2022 Admission date: 3/2/2022 - Discharge disposition: Home or Self Care    Destination    No services have been selected for the patient.              Durable Medical Equipment    No services have been selected for the patient.              Dialysis/Infusion    No services have been selected for the patient.              Home Medical Care Coordination complete.    Service Provider Selected Services Address Phone Fax Patient Preferred    [facility]  Home Health Services [address] [address] [phone] --          Therapy    No services have been selected for the patient.              Community Resources    No services have been selected for the patient.              Community & DME    No services have been selected for the patient.                  Transportation Services  Private: Car    Final Discharge Disposition Code: 06 - home with home health care

## 2022-03-08 ENCOUNTER — TELEPHONE (OUTPATIENT)
Dept: CARDIOLOGY | Facility: CLINIC | Age: 57
End: 2022-03-08

## 2022-03-08 ENCOUNTER — HOME CARE VISIT (OUTPATIENT)
Dept: HOME HEALTH SERVICES | Facility: HOME HEALTHCARE | Age: 57
End: 2022-03-08

## 2022-03-08 ENCOUNTER — READMISSION MANAGEMENT (OUTPATIENT)
Dept: CALL CENTER | Facility: HOSPITAL | Age: 57
End: 2022-03-08

## 2022-03-08 PROCEDURE — G0299 HHS/HOSPICE OF RN EA 15 MIN: HCPCS

## 2022-03-08 NOTE — OUTREACH NOTE
Prep Survey    Flowsheet Row Responses   [facility] facility patient discharged from? [facility]   Is LACE score < 7 ? No   Emergency Room discharge w/ pulse ox? No   Eligibility Readm Mgmt   Discharge diagnosis  Severe multivessel CAD-- s/p CABGx7 LIMA/LSVG   Does the patient have one of the following disease processes/diagnoses(primary or secondary)? Cardiothoracic surgery   Does the patient have Home health ordered? Yes   What is the Home health agency?  [facility]   Is there a DME ordered? No   Medication alerts for this patient Lasix    Prep survey completed? Yes          DHAVAL LUEVANO - Registered Nurse

## 2022-03-08 NOTE — TELEPHONE ENCOUNTER
Jaja ,    Can you cancel this guys apt with [facility]. We will see him at [facility]

## 2022-03-08 NOTE — PAYOR COMM NOTE
"Grecia Morse (57 y.o. Male)                    ATTENTION ; DC SUMMARY CASE REF #    [id number]                     PLEASE FAX DAYS APPROVED THIS ADMISSION TO UR DEPT  [phone]                    TOSHIA DEXTER LPSTEVE                   Date of Birth   1965    Social Security Number       Address   [address] [place] [address]    Home Phone   [phone]    MRN   [id number]       Denominational   None    Marital Status                               Admission Date   3/2/22    Admission Type   Elective    Admitting Provider   Jr Eliseo Hewitt MD    Attending Provider       Department, Room/Bed   [facility] CARDIOVASC UNIT, 2228/1       Discharge Date   3/7/2022    Discharge Disposition   Home or Self Care    Discharge Destination                               Attending Provider: (none)   Allergies: No Known Allergies    Isolation: None   Infection: None   Code Status: Prior   Advance Care Planning Activity    Ht: 177.8 cm (70\")   Wt: 68.7 kg (151 lb 8 oz)    Admission Cmt: None   Principal Problem: Coronary artery disease involving native coronary artery of native heart without angina pectoris [I25.10]                 Active Insurance as of 3/2/2022     Primary Coverage     Payor Plan Insurance Group Employer/Plan Group    [facility] [employer] MEDICARE REPLACEMENT      Payor Plan Address Payor Plan Phone Number Payor Plan Fax Number Effective Dates    [address] [address] [phone]  1/1/2022 - None Entered    [facility] [id number]       Subscriber Name Subscriber Birth Date Member ID       GRECIA MORSE 1965 [id number]                 Emergency Contacts      (Rel.) Home Phone Work Phone Mobile Phone    KADEN MORSE (Mother) [phone] -- [phone]    Gayla Morse (Sister) [phone] -- [phone]               Discharge Summary      Karen Self APRN at 03/07/22 1212     Attestation signed by Jr Kash " Eliseo OLIVER MD at 03/07/22 1547    I have reviewed this documentation and agree.                  Date of Admission: 3/2/2022  Date of Discharge:  3/7/2022    Discharge Diagnosis:   - Severe multivessel CAD-- s/p CABGx7 LIMA/LSVG  - DM II  - CVA with peripheral vision disturbance   - Cerebral palsy with left sided weakness   - Non-healing right foot ulceration    Presenting Problem/History of Present Illness:  Coronary artery disease of native heart with stable angina pectoris, unspecified vessel or lesion type ([facility]) [I25.118]     Hospital Course:  Mr. Morse is a very pleasant 57 year-old gentleman who underwent a CABG x7, APARICIO/LSVG with Dr. Hewitt on 3/2/2022. Post-operatively he overall recovered well. He was extubated the day of surgery and later transferred to stepdown. He did experience issues with hyperglycemia post-op -- medication regimen adjusted for better control. POD#4 he was noted to have a Hgb of 7.8 -- Iron supplement was added -- follow up Hgb with home health in 5 days.     Central line, manzo, epicardial wires, and chest tubes were removed in normal fashion without difficulty/complication.   Mr. Morse was weaned from oxygen and is tolerating current medication regimen. He is voiding and defecating without issue and is eating/drinking sufficiently.   On POD#5 patient was deemed appropriate for discharge home with family and home health.  Sternal precautions along with signs/symptoms of sternal wound infection were reviewed -- verbalized understanding.    Patient to follow up in our office:  Wednesday 3/30/2022 at 9:30 a.m.  Procedures Performed  Procedure(s):  CORONARY ARTERY BYPASS GRAFT X7, WITH LEFT INTERNAL MAMMARY HARVEST, MIDLINE STERNOTOMY,  INTRAOP MARIELA, PRP       Consults:   Consults     Date and Time Order Name Status Description    3/5/2022  5:26 AM Inpatient Cardiology Consult Completed     3/3/2022  9:55 PM Inpatient Hospitalist Consult Completed     3/3/2022  7:27 AM Inpatient Vascular  Surgery Consult Completed           Pertinent Test Results:    Lab Results   Component Value Date    WBC 8.85 03/07/2022    HGB 7.8 (L) 03/07/2022    HCT 22.8 (L) 03/07/2022    MCV 88.4 03/07/2022     03/07/2022      Lab Results   Component Value Date    GLUCOSE 67 03/07/2022    CALCIUM 8.2 (L) 03/07/2022     03/07/2022    K 4.3 03/07/2022    CO2 25.0 03/07/2022     03/07/2022    BUN 31 (H) 03/07/2022    CREATININE 0.91 03/07/2022    BCR 34.1 (H) 03/07/2022    ANIONGAP 8.0 03/07/2022     Lab Results   Component Value Date    INR 1.18 (H) 03/03/2022    PROTIME 15.0 (H) 03/03/2022       Condition on Discharge:  Stable     Vital Signs  Temp:  [98.1 °F (36.7 °C)-98.5 °F (36.9 °C)] 98.5 °F (36.9 °C)  Heart Rate:  [88-96] 92  Resp:  [18] 18  BP: (113-137)/(65-82) 129/66      Discharge Disposition  Home or Self Care -- home with family & HH    Discharge Medications     Discharge Medications      New Medications      Instructions Start Date   acetaminophen 325 MG tablet  Commonly known as: TYLENOL   650 mg, Oral, Every 4 Hours PRN      cyclobenzaprine 10 MG tablet  Commonly known as: FLEXERIL   10 mg, Oral, Every 8 Hours PRN      furosemide 40 MG tablet  Commonly known as: LASIX   40 mg, Oral, Daily   Start Date: March 8, 2022     iron polysaccharides 150 MG capsule  Commonly known as: NIFEREX   150 mg, Oral, Daily   Start Date: March 8, 2022     metoprolol succinate XL 25 MG 24 hr tablet  Commonly known as: TOPROL-XL   25 mg, Oral, Nightly      potassium chloride 20 MEQ CR tablet  Commonly known as: K-DUR,KLOR-CON   20 mEq, Oral, As Needed         Continue These Medications      Instructions Start Date   aspirin 81 MG EC tablet   81 mg, Oral, Daily, PT HOLDING FOR SURGERY      atorvastatin 40 MG tablet  Commonly known as: LIPITOR   40 mg, Oral, Nightly      FLUoxetine 20 MG capsule  Commonly known as: PROzac   20 mg, Oral, Nightly      Lantus 100 UNIT/ML injection  Generic drug: insulin glargine   20  Units, Subcutaneous, 2 Times Daily      metFORMIN 500 MG tablet  Commonly known as: GLUCOPHAGE   500 mg, Oral, 2 Times Daily With Meals      NovoLOG FlexPen 100 UNIT/ML solution pen-injector sc pen  Generic drug: insulin aspart   10 Units, Subcutaneous, 3 Times Daily With Meals         Stop These Medications    amLODIPine 10 MG tablet  Commonly known as: NORVASC     amLODIPine 5 MG tablet  Commonly known as: NORVASC     BUPIVACAINE 0.5% IN DEXTROSE 50% INJECTION - PODIATRY     carvedilol 6.25 MG tablet  Commonly known as: COREG     chlorhexidine 0.12 % solution  Commonly known as: PERIDEX     lisinopril 40 MG tablet  Commonly known as: PRINIVIL,ZESTRIL     mupirocin 2 % nasal ointment  Commonly known as: BACTROBAN     sulfamethoxazole-trimethoprim 800-160 MG per tablet  Commonly known as: Bactrim DS            Discharge Diet:  Heart Healthy    Activity at Discharge:  - No driving for 2 weeks -- ride in backseat of vehicle if a passenger.  - Do not drive if on narcotic pain medications.  - Shower daily: clean incisions with warm water and antibacterial soap only. Gently pat dry. Do not put any lotion or ointments on incisions.  - Ambulate for 10 minutes at least 3 times a day.  - Do not lift >10lbs for 6 weeks or until seen in office and advised otherwise.  - Take all medications as prescribed.    Future Appointments   Date Time Provider Department Center   3/30/2022  9:30 AM Juliana Mendez APRN [facility]     Additional Instructions for the Follow-ups that You Need to Schedule     Ambulatory Referral to Cardiac Rehab   As directed      Ambulatory Referral to Home Health   As directed      Please check Hgb level in 5 days -- 3/12/2022    Order Comments: Please check Hgb level in 5 days -- 3/12/2022     Face to Face Visit Date: 3/7/2022    Follow-up provider for Plan of Care?: I treated the patient in an acute care facility and will not continue treatment after discharge.    Follow-up provider: JR JACQUELINE  CASSIA OLIVER [[id number]]    Reason/Clinical Findings: Post-op weakness    Describe mobility limitations that make leaving home difficult: Post-op weakness    Nursing/Therapeutic Services Requested: Skilled Nursing    Skilled nursing orders: Post CABG care Other    Frequency: 1 Week 1         Call MD With Problems / Concerns   As directed      Instructions:  Call office at [phone] for any drainage, increased redness, or fever over 100.5    Order Comments: Instructions:  Call office at [phone] for any drainage, increased redness, or fever over 100.5          Discharge Follow-up with PCP   As directed       Currently Documented PCP:    Farhad Mark MD    PCP Phone Number:    [phone]     Follow Up Details: in 1 week         Discharge Follow-up with Specialty: Cardiologist APRN/PA; 1 Week   As directed      Specialty: Cardiologist APRN/PA    Follow Up: 1 Week    Follow Up Details: bring all prescription bottles to appointment, call for appointment         Discharge Follow-up with Specified Provider: Cardiologist; 1 Month   As directed      To: Cardiologist    Follow Up: 1 Month    Follow Up Details: call for appointment, bring all medication bottles to appointment         Discharge Follow-up with Specified Provider: Cardiovascular Surgery APRN; 1 Month   As directed      To: Cardiovascular Surgery APRN    Follow Up: 1 Month    Follow Up Details: Wednesday 3/30 at 9:30 a.m., bring all current medications to appointment         Hemoglobin    Mar 12, 2022 (Approximate)      Release to patient: Immediate               Thank you for allowing us to participate in your plan of care.      TAY Stafford  03/07/22  12:12 EST  ..Electronically signed by TAY Stafford, 03/07/22, 1:45 PM EST.        Electronically signed by Jr Cassia Hewitt MD at 03/07/22 9293

## 2022-03-11 ENCOUNTER — LAB REQUISITION (OUTPATIENT)
Dept: LAB | Facility: HOSPITAL | Age: 57
End: 2022-03-11

## 2022-03-11 ENCOUNTER — HOME CARE VISIT (OUTPATIENT)
Dept: HOME HEALTH SERVICES | Facility: HOME HEALTHCARE | Age: 57
End: 2022-03-11

## 2022-03-11 DIAGNOSIS — Z95.1 PRESENCE OF AORTOCORONARY BYPASS GRAFT: ICD-10-CM

## 2022-03-11 LAB
DEPRECATED RDW RBC AUTO: 41.5 FL (ref 37–54)
ERYTHROCYTE [DISTWIDTH] IN BLOOD BY AUTOMATED COUNT: 12.6 % (ref 12.3–15.4)
HCT VFR BLD AUTO: 25.8 % (ref 37.5–51)
HGB BLD-MCNC: 8.4 G/DL (ref 13–17.7)
MCH RBC QN AUTO: 29.6 PG (ref 26.6–33)
MCHC RBC AUTO-ENTMCNC: 32.6 G/DL (ref 31.5–35.7)
MCV RBC AUTO: 90.8 FL (ref 79–97)
PLATELET # BLD AUTO: 418 10*3/MM3 (ref 140–450)
PMV BLD AUTO: 9.3 FL (ref 6–12)
RBC # BLD AUTO: 2.84 10*6/MM3 (ref 4.14–5.8)
WBC NRBC COR # BLD: 10.85 10*3/MM3 (ref 3.4–10.8)

## 2022-03-11 PROCEDURE — 85027 COMPLETE CBC AUTOMATED: CPT | Performed by: NURSE PRACTITIONER

## 2022-03-11 PROCEDURE — G0299 HHS/HOSPICE OF RN EA 15 MIN: HCPCS

## 2022-03-13 VITALS
RESPIRATION RATE: 18 BRPM | OXYGEN SATURATION: 97 % | SYSTOLIC BLOOD PRESSURE: 150 MMHG | DIASTOLIC BLOOD PRESSURE: 90 MMHG

## 2022-03-13 VITALS
RESPIRATION RATE: 18 BRPM | DIASTOLIC BLOOD PRESSURE: 74 MMHG | HEART RATE: 74 BPM | OXYGEN SATURATION: 97 % | SYSTOLIC BLOOD PRESSURE: 132 MMHG | TEMPERATURE: 98.2 F

## 2022-03-13 NOTE — HOME HEALTH
Patient sitting up in couch.  VSS.  Incisions healing well, without complications.  CBC from [facility] with 23G butterfly.  R heal wound no healing.  Pt verbalizes wound care, and reports he will do himself after showing.  T/I s/s of infection and prevention.  Pt competent and compliant with med regime.  Pt left in stable position.  Specimen to [facility].

## 2022-03-15 ENCOUNTER — READMISSION MANAGEMENT (OUTPATIENT)
Dept: CALL CENTER | Facility: HOSPITAL | Age: 57
End: 2022-03-15

## 2022-03-15 ENCOUNTER — HOME CARE VISIT (OUTPATIENT)
Dept: HOME HEALTH SERVICES | Facility: HOME HEALTHCARE | Age: 57
End: 2022-03-15

## 2022-03-15 PROCEDURE — G0495 RN CARE TRAIN/EDU IN HH: HCPCS

## 2022-03-15 NOTE — OUTREACH NOTE
CT Surgery Week 1 Survey    Flowsheet Row Responses   [facility] patient discharged from? [place]   Does the patient have one of the following disease processes/diagnoses(primary or secondary)? Cardiothoracic surgery   Week 1 attempt successful? Yes   Call start time 1533   Call end time 1535   Discharge diagnosis  Severe multivessel CAD-- s/p CABGx7 LIMA/LSVG   Is patient permission given to speak with other caregiver? Yes   List who call center can speak with sister- Gayla   Person spoke with today (if not patient) and relationship sister   Does the patient have all medications related to this admission filled (includes all antibiotics, pain medications, cardiac medications, etc.) Yes   Is the patient taking all medications as directed (includes completed medication regime)? Yes   Comments regarding appointments appt with cardiologist tomorrow (3/16)   Does the patient have a primary care provider?  Yes   Does the patient have an appointment scheduled with their C/T surgeon? Yes   Has the patient kept scheduled appointments due by today? N/A   Comments f/u with CT surgeon on 3/30   What is the Home health agency?  [facility]   Has home health visited the patient within 72 hours of discharge? Yes   Psychosocial issues? No   Did the patient receive a copy of their discharge instructions? Yes   What is the patient's perception of their health status since discharge? Improving   Is the patient/caregiver able to teach back the hierarchy of who to call/visit for symptoms/problems? PCP, Specialist, Home health nurse, Urgent Care, ED, 911 Yes   Week 1 call completed? Yes   Wrap up additional comments Quick call with sister, patient is doing well.            BRIANA RAMESH - Registered Nurse

## 2022-03-16 VITALS
DIASTOLIC BLOOD PRESSURE: 80 MMHG | OXYGEN SATURATION: 93 % | SYSTOLIC BLOOD PRESSURE: 120 MMHG | RESPIRATION RATE: 16 BRPM | HEART RATE: 100 BPM

## 2022-03-16 NOTE — HOME HEALTH
Patient sitting up on sofa.  VSS.  Chest incisions healing well.  R heel pressure injury bleeding once sock taken off.  Applied pressure. Bleeding stopped.  No s/s of infection.  Healing well.  Pt competent in care.  No medication changes.  Seeing PCP tomorrow.  Will continue to monitor.

## 2022-03-18 ENCOUNTER — HOME CARE VISIT (OUTPATIENT)
Dept: HOME HEALTH SERVICES | Facility: HOME HEALTHCARE | Age: 57
End: 2022-03-18

## 2022-03-20 PROCEDURE — G0180 MD CERTIFICATION HHA PATIENT: HCPCS | Performed by: THORACIC SURGERY (CARDIOTHORACIC VASCULAR SURGERY)

## 2022-03-22 ENCOUNTER — READMISSION MANAGEMENT (OUTPATIENT)
Dept: CALL CENTER | Facility: HOSPITAL | Age: 57
End: 2022-03-22

## 2022-03-22 NOTE — OUTREACH NOTE
CT Surgery Week 2 Survey    Flowsheet Row Responses   [facility] patient discharged from? [place]   Does the patient have one of the following disease processes/diagnoses(primary or secondary)? Cardiothoracic surgery   Week 2 attempt successful? Yes   Call start time 1502   Call end time 1503   Discharge diagnosis  Severe multivessel CAD-- s/p CABGx7 LIMA/LSVG   Is patient permission given to speak with other caregiver? Yes   List who call center can speak with sister- Gayla   Person spoke with today (if not patient) and relationship sister   Meds reviewed with patient/caregiver? Yes   Is the patient having any side effects they believe may be caused by any medication additions or changes? No   Does the patient have all medications related to this admission filled (includes all antibiotics, pain medications, cardiac medications, etc.) Yes   Is the patient taking all medications as directed (includes completed medication regime)? Yes   Does the patient have a primary care provider?  Yes   Does the patient have an appointment scheduled with their C/T surgeon? Yes   Has the patient kept scheduled appointments due by today? N/A   What is the Home health agency?  [facility]   Has home health visited the patient within 72 hours of discharge? Yes   Psychosocial issues? No   Did the patient receive a copy of their discharge instructions? Yes   Nursing interventions Reviewed instructions with patient   What is the patient's perception of their health status since discharge? Improving   Nursing interventions Nurse provided patient education   Nursing interventions Reassured on normal signs of recovery   Is the patient /caregiver able to teach back basic post-op care? Keep incision areas clean, dry and protected   Is the patient/caregiver able to teach back signs and symptoms of incisional infection? Fever, Increased redness, swelling or pain at the incisonal site   If the patient is a current smoker, are they able to teach  back resources for cessation? Not a smoker   Is the patient/caregiver able to teach back the hierarchy of who to call/visit for symptoms/problems? PCP, Specialist, Home health nurse, Urgent Care, ED, 911 Yes   Week 2 call completed? Yes   Wrap up additional comments Quick call with sister, patient is doing well.          ODILIA LUEVANO - Registered Nurse

## 2022-03-23 ENCOUNTER — HOME CARE VISIT (OUTPATIENT)
Dept: HOME HEALTH SERVICES | Facility: HOME HEALTHCARE | Age: 57
End: 2022-03-23

## 2022-03-23 VITALS
HEART RATE: 79 BPM | SYSTOLIC BLOOD PRESSURE: 122 MMHG | TEMPERATURE: 97.4 F | OXYGEN SATURATION: 99 % | RESPIRATION RATE: 18 BRPM | DIASTOLIC BLOOD PRESSURE: 68 MMHG

## 2022-03-23 PROCEDURE — G0299 HHS/HOSPICE OF RN EA 15 MIN: HCPCS

## 2022-03-25 ENCOUNTER — HOME CARE VISIT (OUTPATIENT)
Dept: HOME HEALTH SERVICES | Facility: HOME HEALTHCARE | Age: 57
End: 2022-03-25

## 2022-03-25 PROCEDURE — G0300 HHS/HOSPICE OF LPN EA 15 MIN: HCPCS

## 2022-03-27 VITALS
SYSTOLIC BLOOD PRESSURE: 120 MMHG | OXYGEN SATURATION: 98 % | HEART RATE: 102 BPM | TEMPERATURE: 97.6 F | RESPIRATION RATE: 18 BRPM | DIASTOLIC BLOOD PRESSURE: 80 MMHG

## 2022-03-27 NOTE — HOME HEALTH
Patient seen APRN yesterday and started on Iron medication. Nurse educated on medication  Patient leaving wound MILLICENT and vooced he will ot be going to [facility] for treatment

## 2022-03-29 ENCOUNTER — HOME CARE VISIT (OUTPATIENT)
Dept: HOME HEALTH SERVICES | Facility: HOME HEALTHCARE | Age: 57
End: 2022-03-29

## 2022-03-30 ENCOUNTER — OFFICE VISIT (OUTPATIENT)
Dept: CARDIAC SURGERY | Facility: CLINIC | Age: 57
End: 2022-03-30

## 2022-03-30 ENCOUNTER — READMISSION MANAGEMENT (OUTPATIENT)
Dept: CALL CENTER | Facility: HOSPITAL | Age: 57
End: 2022-03-30

## 2022-03-30 VITALS
OXYGEN SATURATION: 98 % | BODY MASS INDEX: 19.76 KG/M2 | WEIGHT: 138 LBS | SYSTOLIC BLOOD PRESSURE: 134 MMHG | DIASTOLIC BLOOD PRESSURE: 81 MMHG | HEIGHT: 70 IN | TEMPERATURE: 97.3 F | HEART RATE: 90 BPM | RESPIRATION RATE: 16 BRPM

## 2022-03-30 DIAGNOSIS — Z95.1 S/P CABG X 7: Primary | ICD-10-CM

## 2022-03-30 PROCEDURE — 99024 POSTOP FOLLOW-UP VISIT: CPT | Performed by: REGISTERED NURSE

## 2022-03-30 RX ORDER — CYCLOBENZAPRINE HCL 10 MG
10 TABLET ORAL EVERY 8 HOURS PRN
Qty: 30 TABLET | Refills: 0 | Status: SHIPPED | OUTPATIENT
Start: 2022-03-30

## 2022-03-30 RX ORDER — FLUOXETINE HYDROCHLORIDE 40 MG/1
CAPSULE ORAL
COMMUNITY
End: 2022-03-30

## 2022-03-30 RX ORDER — POTASSIUM CHLORIDE 1500 MG/1
TABLET, FILM COATED, EXTENDED RELEASE ORAL
COMMUNITY
Start: 2022-03-20

## 2022-03-30 RX ORDER — ATORVASTATIN CALCIUM 80 MG/1
TABLET, FILM COATED ORAL
COMMUNITY
Start: 2022-03-20

## 2022-03-30 RX ORDER — CARVEDILOL 6.25 MG/1
TABLET ORAL
COMMUNITY
Start: 2022-03-20

## 2022-03-30 RX ORDER — BLOOD-GLUCOSE METER
EACH MISCELLANEOUS
COMMUNITY
Start: 2022-03-20

## 2022-03-30 RX ORDER — CYCLOBENZAPRINE HCL 10 MG
TABLET ORAL
COMMUNITY
End: 2022-03-30

## 2022-03-30 RX ORDER — BLOOD SUGAR DIAGNOSTIC
STRIP MISCELLANEOUS
COMMUNITY
Start: 2022-03-22

## 2022-03-30 NOTE — CASE COMMUNICATION
Patient missed a skilled nurse  visit from [facility] on 3.29.22     Reason: unable to contact patient      For your records only.   As per home health protocol, MD must be notified of missed/cancelled visits; therefore the prescribed frequency was not met.

## 2022-03-30 NOTE — PATIENT INSTRUCTIONS
Weigh daily.  Take Lasix (furosemide) for weight gain of 3 lbs in 24 hours or 5 lbs in 72 hours, take potassium pill with every Lasix dose.

## 2022-03-30 NOTE — PROGRESS NOTES
"..CARDIOVASCULAR SURGERY FOLLOW-UP PROGRESS NOTE    Chief Complaint: Post-Op Follow-Up      HPI:   Dear Farhad Mark MD and colleagues:    It was nice to see Pierce Morse in follow up 3 weeks after surgery.  As you know, he is a pleasant 57 y.o. male with PMH significant for severe multi-vessel CAD, DM II, CVA with peripheral vision disturbance, cerebral palsy with left sided weakness, & non-healing right foot ulcer. Underwent CABG x7 utilizing LIMA/LSVG with Dr. Hewitt on 3/2/2022.     He did well post-operatively. Did experience issues with hyperglycemia -- medication regimen was adjust. On POD#5 patient was deemed appropriate for discharge home with family and home health.     Mr. Morse presents to office today for follow-up. Appears well and has no acute complaints/concerns. Denies popping/clicking/rubbing in the sternum. Denies fever/chills & sign/symptoms of infection. Mid-sternal incision and SVG healing nicely with no erythema/drainage/dehiscence present. Has followed up with PCP, cardiology, and plans to start cardiac rehab soon. Is expected to be placed on insulin pump therapy with continuous glucose monitoring for better management of his DM II.    From a surgical standpoint Mr. Morse is progressing well. No need for scheduled follow-up visit in our office.                /81 (BP Location: Left arm, Patient Position: Sitting, Cuff Size: Adult)   Pulse 90   Temp 97.3 °F (36.3 °C) (Infrared)   Resp 16   Ht 177.8 cm (70\")   Wt 62.6 kg (138 lb)   SpO2 98%   BMI 19.80 kg/m²   Heart:  regular rate and rhythm, S1, S2 normal, no murmur, click, rub or gallop  Lungs:  clear to auscultation bilaterally  Extremities:  no edema  Incision(s):  mid chest healing well, no significant drainage, no dehiscence, no significant erythema, left leg healing well, no significant drainage, no dehiscence, no significant erythema    Assessment/Plan:     S/P CABG. Overall, he is doing well.    No significant " post-op complications    No heavy lifting > 10 pounds for 2 more weeks  Keep incisions clean and dry  OK to drive if not taking narcotic pain medicine  OK to begin cardiac rehab  Follow-up as scheduled with cardiology  Follow-up as scheduled with PCP  Follow-up with CT surgery prn    Encouraged patient to call office with any questions or concerns as scheduled follow-up is not required at this time.   Instructions/restrictions verbally reviewed with patient -- verbalized understanding.  After visit summary provided to patient.    Thank you for allowing me to participate in the plan of care for your patient.  Best Regards,    TAY Johnson  03/30/22  13:42 EDT   ..Electronically signed by ATY Stafford, 03/30/22, 1:56 PM EDT.

## 2022-03-30 NOTE — OUTREACH NOTE
CT Surgery Week 3 Survey    Flowsheet Row Responses   [facility] patient discharged from? [place]   Does the patient have one of the following disease processes/diagnoses(primary or secondary)? Cardiothoracic surgery   Week 3 attempt successful? Yes   Call start time 1500   Call end time 1502   Discharge diagnosis  Severe multivessel CAD-- s/p CABGx7 LIMA/LSVG   Is patient permission given to speak with other caregiver? Yes   List who call center can speak with sister- Gayla   Person spoke with today (if not patient) and relationship sister   Meds reviewed with patient/caregiver? Yes   Is the patient taking all medications as directed (includes completed medication regime)? Yes   Has the patient kept scheduled appointments due by today? Yes   Home health comments HH still coming in   Psychosocial issues? No   What is the patient's perception of their health status since discharge? Improving   Is the patient /caregiver able to teach back basic post-op care? Keep incision areas clean, dry and protected   Is the patient/caregiver able to teach back signs and symptoms of incisional infection? Increased redness, swelling or pain at the incisonal site, Increased drainage or bleeding, Incisional warmth, Pus or odor from incision, Fever   Is the patient/caregiver able to teach back steps to recovery at home? Rest and rebuild strength, gradually increase activity   Is the patient/caregiver able to teach back the hierarchy of who to call/visit for symptoms/problems? PCP, Specialist, Home health nurse, Urgent Care, ED, 911 Yes   Additional teach back comments Sister states pt doing very well. She was very pleased with his care.    Week 3 call completed? Yes          VY KOCH - Registered Nurse

## 2022-04-01 ENCOUNTER — HOME CARE VISIT (OUTPATIENT)
Dept: HOME HEALTH SERVICES | Facility: HOME HEALTHCARE | Age: 57
End: 2022-04-01

## 2022-04-01 PROCEDURE — G0495 RN CARE TRAIN/EDU IN HH: HCPCS

## 2022-04-02 VITALS
OXYGEN SATURATION: 96 % | DIASTOLIC BLOOD PRESSURE: 70 MMHG | SYSTOLIC BLOOD PRESSURE: 120 MMHG | HEART RATE: 72 BPM | RESPIRATION RATE: 16 BRPM

## 2022-04-08 ENCOUNTER — READMISSION MANAGEMENT (OUTPATIENT)
Dept: CALL CENTER | Facility: HOSPITAL | Age: 57
End: 2022-04-08

## 2022-04-08 NOTE — OUTREACH NOTE
CT Surgery Week 4 Survey    Flowsheet Row Responses   [facility] facility patient discharged from? [place]   Does the patient have one of the following disease processes/diagnoses(primary or secondary)? Cardiothoracic surgery   Week 4 attempt successful? No   Rescheduled Revoked  [No answer]   Revoke Decline to participate   Discharge diagnosis  Severe multivessel CAD-- s/p CABGx7 LIMA/LSVG          ROC BRANTLEY - Registered Nurse

## 2023-02-14 NOTE — DISCHARGE PLACEMENT REQUEST
"Grecia Barrera (57 y.o. Male)             Date of Birth   1965    Social Security Number       Address   [address] [address]    Home Phone   [phone]    MRN   [id number]       Yazidi   None    Marital Status                               Admission Date   3/2/22    Admission Type   Elective    Admitting Provider   Jr Eliseo Hewitt MD    Attending Provider   Jr Eliseo Hewitt MD    Department, Room/Bed   [facility] CARDIOVASC UNIT, 2228/1       Discharge Date       Discharge Disposition   Home or Self Care    Discharge Destination                               Attending Provider: Jr Eliseo Hewitt MD    Allergies: No Known Allergies    Isolation: None   Infection: None   Code Status: CPR   Advance Care Planning Activity    Ht: 177.8 cm (70\")   Wt: 68.7 kg (151 lb 8 oz)    Admission Cmt: None   Principal Problem: Coronary artery disease involving native coronary artery of native heart without angina pectoris [I25.10]                 Active Insurance as of 3/2/2022     Primary Coverage     Payor Plan Insurance Group Employer/Plan Group    [employer] [place] MEDICARE REPLACEMENT [facility] MEDICARE REPLACEMENT      Payor Plan Address Payor Plan Phone Number Payor Plan Fax Number Effective Dates    [address] [address] [phone]  1/1/2022 - None Entered    [facility] [address]       Subscriber Name Subscriber Birth Date Member ID       GRECIA BARRERA 1965 [id number]                 Emergency Contacts      (Rel.) Home Phone Work Phone Mobile Phone    KADEN BARRERA (Mother) [phone] -- [phone]    FaxonGayla vasques (Sister) [phone] -- [phone]              " 33.3

## 2023-10-09 ENCOUNTER — HOSPITAL ENCOUNTER (EMERGENCY)
Facility: HOSPITAL | Age: 58
Discharge: HOME | End: 2023-10-09
Attending: EMERGENCY MEDICINE | Admitting: EMERGENCY MEDICINE
Payer: MEDICAID

## 2023-10-09 VITALS
BODY MASS INDEX: 19.18 KG/M2 | WEIGHT: 134 LBS | DIASTOLIC BLOOD PRESSURE: 75 MMHG | TEMPERATURE: 97.1 F | RESPIRATION RATE: 16 BRPM | OXYGEN SATURATION: 98 % | SYSTOLIC BLOOD PRESSURE: 128 MMHG | HEIGHT: 70 IN | HEART RATE: 69 BPM

## 2023-10-09 DIAGNOSIS — Z86.39 HISTORY OF DIABETES MELLITUS: ICD-10-CM

## 2023-10-09 DIAGNOSIS — N18.9 CHRONIC RENAL IMPAIRMENT, UNSPECIFIED CKD STAGE: ICD-10-CM

## 2023-10-09 DIAGNOSIS — Z86.79 HISTORY OF HYPERTENSION: ICD-10-CM

## 2023-10-09 DIAGNOSIS — R73.9 HYPERGLYCEMIA: Primary | ICD-10-CM

## 2023-10-09 LAB
ALBUMIN SERPL-MCNC: 3.6 G/DL (ref 3.5–5.2)
ALBUMIN/GLOB SERPL: 1.6 G/DL
ALP SERPL-CCNC: 87 U/L (ref 39–117)
ALT SERPL W P-5'-P-CCNC: 60 U/L (ref 1–41)
ANION GAP SERPL CALCULATED.3IONS-SCNC: 16.3 MMOL/L (ref 5–15)
AST SERPL-CCNC: 40 U/L (ref 1–40)
ATMOSPHERIC PRESS: 744.4 MMHG
BACTERIA UR QL AUTO: NORMAL /HPF
BASE EXCESS BLDV CALC-SCNC: -4.6 MMOL/L (ref -2–2)
BASOPHILS # BLD AUTO: 0.04 10*3/MM3 (ref 0–0.2)
BASOPHILS NFR BLD AUTO: 0.5 % (ref 0–1.5)
BILIRUB SERPL-MCNC: 0.2 MG/DL (ref 0–1.2)
BILIRUB UR QL STRIP: NEGATIVE
BUN SERPL-MCNC: 37 MG/DL (ref 6–20)
BUN/CREAT SERPL: 21.8 (ref 7–25)
CALCIUM SPEC-SCNC: 8.8 MG/DL (ref 8.6–10.5)
CHLORIDE SERPL-SCNC: 97 MMOL/L (ref 98–107)
CLARITY UR: CLEAR
CO2 BLDA-SCNC: 20.3 MMOL/L (ref 23–27)
CO2 SERPL-SCNC: 19.7 MMOL/L (ref 22–29)
COLOR UR: YELLOW
CREAT SERPL-MCNC: 1.7 MG/DL (ref 0.76–1.27)
DEPRECATED RDW RBC AUTO: 44.4 FL (ref 37–54)
DEVICE COMMENT: ABNORMAL
EGFRCR SERPLBLD CKD-EPI 2021: 46.7 ML/MIN/1.73
EOSINOPHIL # BLD AUTO: 0.04 10*3/MM3 (ref 0–0.4)
EOSINOPHIL NFR BLD AUTO: 0.5 % (ref 0.3–6.2)
ERYTHROCYTE [DISTWIDTH] IN BLOOD BY AUTOMATED COUNT: 12.6 % (ref 12.3–15.4)
GLOBULIN UR ELPH-MCNC: 2.3 GM/DL
GLUCOSE BLDC GLUCOMTR-MCNC: 356 MG/DL (ref 70–130)
GLUCOSE SERPL-MCNC: 517 MG/DL (ref 65–99)
GLUCOSE UR STRIP-MCNC: ABNORMAL MG/DL
HCO3 BLDV-SCNC: 19.3 MMOL/L (ref 22–28)
HCT VFR BLD AUTO: 34.4 % (ref 37.5–51)
HGB BLD-MCNC: 11.3 G/DL (ref 13–17.7)
HGB UR QL STRIP.AUTO: ABNORMAL
HYALINE CASTS UR QL AUTO: NORMAL /LPF
IMM GRANULOCYTES # BLD AUTO: 0.02 10*3/MM3 (ref 0–0.05)
IMM GRANULOCYTES NFR BLD AUTO: 0.3 % (ref 0–0.5)
KETONES UR QL STRIP: ABNORMAL
LEUKOCYTE ESTERASE UR QL STRIP.AUTO: NEGATIVE
LIPASE SERPL-CCNC: 10 U/L (ref 13–60)
LYMPHOCYTES # BLD AUTO: 1.09 10*3/MM3 (ref 0.7–3.1)
LYMPHOCYTES NFR BLD AUTO: 14.4 % (ref 19.6–45.3)
MAGNESIUM SERPL-MCNC: 2.6 MG/DL (ref 1.6–2.6)
MCH RBC QN AUTO: 31.8 PG (ref 26.6–33)
MCHC RBC AUTO-ENTMCNC: 32.8 G/DL (ref 31.5–35.7)
MCV RBC AUTO: 96.9 FL (ref 79–97)
MODALITY: ABNORMAL
MONOCYTES # BLD AUTO: 0.56 10*3/MM3 (ref 0.1–0.9)
MONOCYTES NFR BLD AUTO: 7.4 % (ref 5–12)
NEUTROPHILS NFR BLD AUTO: 5.8 10*3/MM3 (ref 1.7–7)
NEUTROPHILS NFR BLD AUTO: 76.9 % (ref 42.7–76)
NITRITE UR QL STRIP: NEGATIVE
NRBC BLD AUTO-RTO: 0 /100 WBC (ref 0–0.2)
PCO2 BLDV: 31.2 MM HG (ref 41–51)
PH BLDV: 7.4 PH UNITS (ref 7.31–7.41)
PH UR STRIP.AUTO: <=5 [PH] (ref 5–8)
PHOSPHATE SERPL-MCNC: 3 MG/DL (ref 2.5–4.5)
PLATELET # BLD AUTO: 191 10*3/MM3 (ref 140–450)
PMV BLD AUTO: 10 FL (ref 6–12)
PO2 BLDV: 50.7 MM HG (ref 35–45)
POTASSIUM SERPL-SCNC: 4.7 MMOL/L (ref 3.5–5.2)
PROT SERPL-MCNC: 5.9 G/DL (ref 6–8.5)
PROT UR QL STRIP: NEGATIVE
RBC # BLD AUTO: 3.55 10*6/MM3 (ref 4.14–5.8)
RBC # UR STRIP: NORMAL /HPF
REF LAB TEST METHOD: NORMAL
SAO2 % BLDCOV: 86.1 % (ref 45–75)
SODIUM SERPL-SCNC: 133 MMOL/L (ref 136–145)
SP GR UR STRIP: 1.02 (ref 1–1.03)
SQUAMOUS #/AREA URNS HPF: NORMAL /HPF
UROBILINOGEN UR QL STRIP: ABNORMAL
WBC # UR STRIP: NORMAL /HPF
WBC NRBC COR # BLD: 7.55 10*3/MM3 (ref 3.4–10.8)

## 2023-10-09 PROCEDURE — 85025 COMPLETE CBC W/AUTO DIFF WBC: CPT | Performed by: EMERGENCY MEDICINE

## 2023-10-09 PROCEDURE — 82803 BLOOD GASES ANY COMBINATION: CPT

## 2023-10-09 PROCEDURE — 81001 URINALYSIS AUTO W/SCOPE: CPT | Performed by: EMERGENCY MEDICINE

## 2023-10-09 PROCEDURE — 83690 ASSAY OF LIPASE: CPT | Performed by: EMERGENCY MEDICINE

## 2023-10-09 PROCEDURE — 83735 ASSAY OF MAGNESIUM: CPT | Performed by: EMERGENCY MEDICINE

## 2023-10-09 PROCEDURE — 63710000001 INSULIN REGULAR HUMAN PER 5 UNITS: Performed by: EMERGENCY MEDICINE

## 2023-10-09 PROCEDURE — 82948 REAGENT STRIP/BLOOD GLUCOSE: CPT

## 2023-10-09 PROCEDURE — 99283 EMERGENCY DEPT VISIT LOW MDM: CPT

## 2023-10-09 PROCEDURE — 80053 COMPREHEN METABOLIC PANEL: CPT | Performed by: EMERGENCY MEDICINE

## 2023-10-09 PROCEDURE — 84100 ASSAY OF PHOSPHORUS: CPT | Performed by: EMERGENCY MEDICINE

## 2023-10-09 PROCEDURE — 25810000003 SODIUM CHLORIDE 0.9 % SOLUTION: Performed by: EMERGENCY MEDICINE

## 2023-10-09 RX ADMIN — INSULIN HUMAN 10 UNITS: 100 INJECTION, SOLUTION PARENTERAL at 13:09

## 2023-10-09 RX ADMIN — SODIUM CHLORIDE 1000 ML: 9 INJECTION, SOLUTION INTRAVENOUS at 11:45

## 2023-10-09 NOTE — ED NOTES
Pt has an insulin pump and it was reprogrammed last week.  For the last 3 days his blood sugar has been > 600

## 2023-10-09 NOTE — ED PROVIDER NOTES
EMERGENCY DEPARTMENT ENCOUNTER    Room Number:  10/10  PCP: Farhad Mark MD  Historian: Patient      HPI:  Chief Complaint: Hyperglycemia  A complete HPI/ROS/PMH/PSH/SH/FH are unobtainable due to: None    Context: Pierce Morse is a 58 y.o. male who presents to the ED via private vehicle for evaluation for high blood sugar readings over the last several days greater than 600.  Insulin pump was reprogrammed about a week ago and over last few days levels have been running higher.  Reports feeling fatigued but no fevers, chills, cough or chest pain, shortness breath, vomiting, diarrhea.  Does not take any additional diabetic medications other than his insulin pump.      MEDICAL RECORD REVIEW    External (non-ED) record review: No medication allergies listed on chart review in epic    PAST MEDICAL HISTORY  Active Ambulatory Problems     Diagnosis Date Noted    Coronary artery disease involving native coronary artery of native heart without angina pectoris 01/27/2022    Ischemic cardiomyopathy 01/27/2022    Chronic stable angina 01/27/2022    Type 2 diabetes mellitus with diabetic peripheral angiopathy without gangrene, with long-term current use of insulin 01/27/2022    Cerebral palsy 01/27/2022    Coronary artery disease of native heart with stable angina pectoris 03/02/2022    Heel ulcer      Resolved Ambulatory Problems     Diagnosis Date Noted    No Resolved Ambulatory Problems     Past Medical History:   Diagnosis Date    Alcohol abuse     CAD (coronary artery disease)     CP (cerebral palsy)     Depression     Diabetes mellitus     DVT (deep venous thrombosis)     Hyperlipidemia     Hypertension     PVD (peripheral vascular disease)     Renal insufficiency     Stroke 02/2021         PAST SURGICAL HISTORY  Past Surgical History:   Procedure Laterality Date    CLUB FOOT RELEASE Left     X6    CORONARY ARTERY BYPASS GRAFT N/A 3/2/2022    Procedure: CORONARY ARTERY BYPASS GRAFT X5, WITH LEFT INTERNAL MAMMARY  HARVEST, MIDLINE STERNOTOMY,  INTRAOP MARIELA, PRP;  Surgeon: Jr Eliseo Hewitt MD;  Location: [facility];  Service: Cardiothoracic;  Laterality: N/A;    ENDOSCOPY      EYE MUSCLE SURGERY Right          FAMILY HISTORY  Family History   Problem Relation Age of Onset    Malig Hyperthermia Neg Hx          SOCIAL HISTORY  Social History     Socioeconomic History    Marital status:    Tobacco Use    Smoking status: Former     Types: Cigarettes     Quit date: 3/17/2021     Years since quittin.5    Smokeless tobacco: Former   Vaping Use    Vaping Use: Never used   Substance and Sexual Activity    Alcohol use: Not Currently     Comment: LAST DRINK 2021    Drug use: Never    Sexual activity: Defer         ALLERGIES  Patient has no known allergies.        REVIEW OF SYSTEMS  Review of Systems     All systems reviewed and negative except for those discussed in HPI.       PHYSICAL EXAM    I have reviewed the triage vital signs and nursing notes.    ED Triage Vitals   Temp Heart Rate Resp BP SpO2   10/09/23 1034 10/09/23 1034 10/09/23 1034 10/09/23 1040 10/09/23 1034   97.1 øF (36.2 øC) 69 16 114/60 98 %      Temp src Heart Rate Source Patient Position BP Location FiO2 (%)   10/09/23 1034 10/09/23 1034 -- -- --   Tympanic Monitor          Physical Exam  General: No acute distress, nontoxic  HEENT: Mucous membranes tacky, atraumatic, EOMI  Neck: Full ROM  Pulm: Symmetric chest rise, nonlabored, lungs CTAB  Cardiovascular: Regular rate and rhythm, intact distal pulses  GI: Soft, nontender, nondistended, no rebound, no guarding, bowel sounds present  MSK: Full ROM, no deformity  Skin: Warm, dry  Neuro: Awake, alert, oriented x 4, GCS 15, moving all extremities, no focal deficits  Psych: Calm, cooperative        LAB RESULTS  Recent Results (from the past 24 hour(s))   Comprehensive Metabolic Panel    Collection Time: 10/09/23 11:40 AM    Specimen: Blood   Result Value Ref Range    Glucose 517 (C) 65 - 99 mg/dL     BUN 37 (H) 6 - 20 mg/dL    Creatinine 1.70 (H) 0.76 - 1.27 mg/dL    Sodium 133 (L) 136 - 145 mmol/L    Potassium 4.7 3.5 - 5.2 mmol/L    Chloride 97 (L) 98 - 107 mmol/L    CO2 19.7 (L) 22.0 - 29.0 mmol/L    Calcium 8.8 8.6 - 10.5 mg/dL    Total Protein 5.9 (L) 6.0 - 8.5 g/dL    Albumin 3.6 3.5 - 5.2 g/dL    ALT (SGPT) 60 (H) 1 - 41 U/L    AST (SGOT) 40 1 - 40 U/L    Alkaline Phosphatase 87 39 - 117 U/L    Total Bilirubin 0.2 0.0 - 1.2 mg/dL    Globulin 2.3 gm/dL    A/G Ratio 1.6 g/dL    BUN/Creatinine Ratio 21.8 7.0 - 25.0    Anion Gap 16.3 (H) 5.0 - 15.0 mmol/L    eGFR 46.7 (L) >60.0 mL/min/1.73   Lipase    Collection Time: 10/09/23 11:40 AM    Specimen: Blood   Result Value Ref Range    Lipase 10 (L) 13 - 60 U/L   Magnesium    Collection Time: 10/09/23 11:40 AM    Specimen: Blood   Result Value Ref Range    Magnesium 2.6 1.6 - 2.6 mg/dL   Phosphorus    Collection Time: 10/09/23 11:40 AM    Specimen: Blood   Result Value Ref Range    Phosphorus 3.0 2.5 - 4.5 mg/dL   CBC Auto Differential    Collection Time: 10/09/23 11:40 AM    Specimen: Blood   Result Value Ref Range    WBC 7.55 3.40 - 10.80 10*3/mm3    RBC 3.55 (L) 4.14 - 5.80 10*6/mm3    Hemoglobin 11.3 (L) 13.0 - 17.7 g/dL    Hematocrit 34.4 (L) 37.5 - 51.0 %    MCV 96.9 79.0 - 97.0 fL    MCH 31.8 26.6 - 33.0 pg    MCHC 32.8 31.5 - 35.7 g/dL    RDW 12.6 12.3 - 15.4 %    RDW-SD 44.4 37.0 - 54.0 fl    MPV 10.0 6.0 - 12.0 fL    Platelets 191 140 - 450 10*3/mm3    Neutrophil % 76.9 (H) 42.7 - 76.0 %    Lymphocyte % 14.4 (L) 19.6 - 45.3 %    Monocyte % 7.4 5.0 - 12.0 %    Eosinophil % 0.5 0.3 - 6.2 %    Basophil % 0.5 0.0 - 1.5 %    Immature Grans % 0.3 0.0 - 0.5 %    Neutrophils, Absolute 5.80 1.70 - 7.00 10*3/mm3    Lymphocytes, Absolute 1.09 0.70 - 3.10 10*3/mm3    Monocytes, Absolute 0.56 0.10 - 0.90 10*3/mm3    Eosinophils, Absolute 0.04 0.00 - 0.40 10*3/mm3    Basophils, Absolute 0.04 0.00 - 0.20 10*3/mm3    Immature Grans, Absolute 0.02 0.00 - 0.05 10*3/mm3     nRBC 0.0 0.0 - 0.2 /100 WBC   Urinalysis With Microscopic If Indicated (No Culture) - Urine, Clean Catch    Collection Time: 10/09/23 11:44 AM    Specimen: Urine, Clean Catch   Result Value Ref Range    Color, UA Yellow Yellow, Straw    Appearance, UA Clear Clear    pH, UA <=5.0 5.0 - 8.0    Specific Gravity, UA 1.024 1.005 - 1.030    Glucose, UA >=1000 mg/dL (3+) (A) Negative    Ketones, UA 40 mg/dL (2+) (A) Negative    Bilirubin, UA Negative Negative    Blood, UA Small (1+) (A) Negative    Protein, UA Negative Negative    Leuk Esterase, UA Negative Negative    Nitrite, UA Negative Negative    Urobilinogen, UA 0.2 E.U./dL 0.2 - 1.0 E.U./dL   Urinalysis, Microscopic Only - Urine, Clean Catch    Collection Time: 10/09/23 11:44 AM    Specimen: Urine, Clean Catch   Result Value Ref Range    RBC, UA 0-2 None Seen, 0-2 /HPF    WBC, UA 0-2 None Seen, 0-2 /HPF    Bacteria, UA None Seen None Seen /HPF    Squamous Epithelial Cells, UA 0-2 None Seen, 0-2 /HPF    Hyaline Casts, UA 0-2 None Seen /LPF    Methodology Automated Microscopy    Blood Gas, Venous -    Collection Time: 10/09/23 12:20 PM    Specimen: Venous Blood   Result Value Ref Range    pH, Venous 7.400 7.310 - 7.410 pH Units    pCO2, Venous 31.2 (L) 41.0 - 51.0 mm Hg    pO2, Venous 50.7 (H) 35.0 - 45.0 mm Hg    HCO3, Venous 19.3 (L) 22.0 - 28.0 mmol/L    Base Excess, Venous -4.6 (L) -2.0 - 2.0 mmol/L    O2 Saturation, Venous 86.1 (H) 45.0 - 75.0 %    CO2 Content 20.3 (L) 23 - 27 mmol/L    Barometric Pressure for Blood Gas 744.4000 mmHg    Modality Room Air     Device Comment [id number]    POC Glucose Once    Collection Time: 10/09/23  2:00 PM    Specimen: Blood   Result Value Ref Range    Glucose 356 (H) 70 - 130 mg/dL       Ordered the above labs and independently interpreted results. My findings will be discussed in the medical decision making section below        RADIOLOGY  No Radiology Exams Resulted Within Past 24 Hours    Ordered the above noted  radiological studies.  Independently interpreted by me and my independent review of findings can be found in the ED Course.  See dictation for official radiology interpretation.      PROCEDURES    Procedures      MEDICATIONS GIVEN IN ER    Medications   sodium chloride 0.9 % bolus 1,000 mL (0 mL Intravenous Stopped 10/9/23 1433)   insulin regular (humuLIN R,novoLIN R) injection 10 Units (10 Units Intravenous Given 10/9/23 1309)         PROGRESS, DATA ANALYSIS, CONSULTS, AND MEDICAL DECISION MAKING    Please note that this section constitutes my independent interpretation of clinical data including lab results, radiology, EKG's.  This constitutes my independent professional opinion regarding differential diagnosis and management of this patient.  It may include any factors such as history from outside sources, review of external records, social determinants of health, management of medications, response to those treatments, and discussions with other providers.    Differential Diagnosis and Plan: Initial concern for hyperglycemia, DKA, renal failure, electrolyte abnormalities, among others.  Plan for labs, IV fluids, and reevaluation with results.    Additional sources:  - Discussed/ obtained information from independent historians:       - Chronic or social conditions impacting care:      - Shared decision making:  Patient fully updated on and in agreement with the course and plan moving forward    ED Course as of 10/09/23 1440   Mon Oct 09, 2023   1155 WBC: 7.55 [DC]   1155 Hemoglobin(!): 11.3 [DC]   1155 Platelets: 191 [DC]   1226 Phosphorus: 3.0 [DC]   1226 Lipase(!): 10 [DC]   1226 Nitrite, UA: Negative [DC]   1226 Leukocytes, UA: Negative [DC]   1226 Blood, UA(!): Small (1+) [DC]   1226 Ketones, UA(!): 40 mg/dL (2+) [DC]   1226 Bacteria, UA: None Seen [DC]   1226 WBC, UA: 0-2 [DC]   1226 pH, Venous: 7.400 [DC]   1226 HCO3, Venous(!): 19.3 [DC]   1244 Bacteria, UA: None Seen [DC]   1244 WBC, UA: 0-2 [DC]   1244  Glucose(!!): 517 [DC]   1244 BUN(!): 37 [DC]   1244 Creatinine(!): 1.70  1.53 one year ago [DC]   1244 Sodium(!): 133 [DC]   1244 Potassium: 4.7 [DC]   1244 CO2(!): 19.7 [DC]   1244 ALT (SGPT)(!): 60 [DC]   1244 AST (SGOT): 40 [DC]   1244 Alkaline Phosphatase: 87 [DC]   1244 Total Bilirubin: 0.2 [DC]   1432 Glucose(!): 356 [DC]   1434 Patient update on Avinza day, hyperglycemic but without DKA, no evidence any acute emergent findings.  He is to see his primary care team later this afternoon for follow-up.  He is well-appearing in no acute distress.  Safe for discharge.  ED return for worsening symptoms as needed.  All questions and concerns addressed. [DC]      ED Course User Index  [DC] Padilla Wilson MD       Hospitalization Considered?: yes, however, no evidence of DKA or infectious process, improved glucose levels and hemodynamically stable without outpatient follow up already established for later this afternoon    Orders Placed During This Visit:  Orders Placed This Encounter   Procedures    Comprehensive Metabolic Panel    Lipase    Urinalysis With Microscopic If Indicated (No Culture) - Urine, Clean Catch    Magnesium    Phosphorus    Blood Gas, Venous -    CBC Auto Differential    Urinalysis, Microscopic Only - Urine, Clean Catch    Blood Gas, Venous -    POC Glucose Once    CBC & Differential       Additional orders considered but not placed:      Independent interpretation of labs, radiology studies, and discussions with consultants: See ED Course        AS OF 14:40 EDT VITALS:    BP - 124/69  HR - 65  TEMP - 97.1 øF (36.2 øC) (Tympanic)  02 SATS - 97%        DIAGNOSIS  Final diagnoses:   Hyperglycemia   History of diabetes mellitus   Chronic renal impairment, unspecified CKD stage   History of hypertension         DISPOSITION  DISCHARGE    Patient discharged in stable condition.    Reviewed implications of results, diagnosis, meds, responsibility to follow up, warning signs and symptoms of possible  worsening, potential complications and reasons to return to ER. If your blood pressure > 120/80 please follow up with your primary care provider for further management.    Patient/Family voiced understanding of above instructions.    Discussed plan for discharge, as there is no emergent indication for admission. Pt/family is agreeable and understands need for follow up and repeat testing.  Pt is aware that discharge does not mean that nothing is wrong but it indicates no emergency is present that requires admission and they must continue care with follow-up as given below or physician of their choice.     FOLLOW-UP  [facility] EMERGENCY DEPARTMENT  [address] [facility] [address] [phone]    As needed, If symptoms worsen    Farhad Mark MD  [address] [facility] [address] [phone]    Schedule an appointment as soon as possible for a visit            Medication List      No changes were made to your prescriptions during this visit.                       --    Please note that portions of this were completed with a voice recognition program.       Note Disclaimer: At [facility], we believe that sharing information builds trust and better relationships. You are receiving this note because you are receiving care at [facility] or recently visited. It is possible you will see health information before a provider has talked with you about it. This kind of information can be easy to misunderstand. To help you fully understand what it means for your health, we urge you to discuss this note with your provider.           Padilla Wilson MD  10/09/23 7042

## 2023-10-09 NOTE — DISCHARGE INSTRUCTIONS
Stay well-hydrated, close follow-up with your primary care team on your insulin dosing and glucose levels, ED return for worsening symptoms as needed.

## (undated) DEVICE — PK HEART OPN 40

## (undated) DEVICE — SOL IRR H2O BTL 1000ML STRL

## (undated) DEVICE — Device

## (undated) DEVICE — Device: Brand: LEVEL 1

## (undated) DEVICE — CVR PROB 96IN LF STRL

## (undated) DEVICE — SYS VASOVIEW HEMOPRO ENDOSCOPIC HARVST VESL

## (undated) DEVICE — CANN ART SOFTFLOW EXT W/SUT/RNG 7MM

## (undated) DEVICE — NEEDLE, QUINCKE, 20GX3.5": Brand: MEDLINE

## (undated) DEVICE — TOWEL,OR,DSP,ST,WHITE,DLX,4/PK,20PK/CS: Brand: MEDLINE

## (undated) DEVICE — SYR LUERLOK 20CC BX/50

## (undated) DEVICE — BLOWER/MISTER AXIOUS OPCAB W/TBG

## (undated) DEVICE — DRN WND CH RND FUL/FLUT NO/TROC 3/8IN 28F

## (undated) DEVICE — GLV SURG BIOGEL M LTX PF 7 1/2

## (undated) DEVICE — SYS PERFUS SEP PLATLT W TIPS CUST

## (undated) DEVICE — SOL ISO/ALC RUB 70PCT 4OZ

## (undated) DEVICE — ST. SORBAVIEW ULTIMATE IJ SYSTEM A,C: Brand: CENTURION

## (undated) DEVICE — SUT SILK 0 CT1 CR8 18IN C021D

## (undated) DEVICE — GLV SURG SIGNATURE ESSENTIAL PF LTX SZ7.5

## (undated) DEVICE — SOL IRR NACL 0.9PCT BT 1000ML

## (undated) DEVICE — LOU OPEN HEART DR POLLOCK: Brand: MEDLINE INDUSTRIES, INC.

## (undated) DEVICE — DRSNG WND GEL FIBR OPTICELL AG PLS W/SLV LF 4X5IN  STRL

## (undated) DEVICE — PK PERFUS CUST W/CARDIOPLEGIA

## (undated) DEVICE — 32 FR RIGHT ANGLE – SOFT PVC CATHETER: Brand: PVC THORACIC CATHETERS

## (undated) DEVICE — BIOPATCH™ ANTIMICROBIAL DRESSING WITH CHLORHEXIDINE GLUCONATE IS A HYDROPHILLIC POLYURETHANE ABSORPTIVE FOAM WITH CHLORHEXIDINE GLUCONATE (CHG) WHICH INHIBITS BACTERIAL GROWTH UNDER THE DRESSING. THE DRESSING IS INTENDED TO BE USED TO ABSORB EXUDATE, COVER A WOUND CAUSED BY VASCULAR AND NONVASCULAR PERCUTANEOUS MEDICAL DEVICES DURING SURGERY, AS WELL AS REDUCE LOCAL INFECTION AND COLONIZATION OF MICROORGANISMS.: Brand: BIOPATCH

## (undated) DEVICE — DRSNG SURESITE WNDW 2.38X2.75

## (undated) DEVICE — ADHS SKIN SURG TISS VISC PREMIERPRO EXOFIN HI/VISC FAST/DRY

## (undated) DEVICE — DRP SLUSH WARMR MACH CIR 44X44IN

## (undated) DEVICE — SUT PROLN MO.5 7/0 DBLARM BV175 6 2X30 BX/12

## (undated) DEVICE — ROTATING SURGICAL PUNCHES, 1 PER POUCH: Brand: A&E MEDICAL / ROTATING SURGICAL PUNCHES

## (undated) DEVICE — 12 FOOT DISPOSABLE EXTENSION CABLE WITH SAFE CONNECT / SCREW-DOWN

## (undated) DEVICE — PK ATS CUST W CARDIOTOMY RESEVOIR

## (undated) DEVICE — SUT VIC 0 CT1 CR8 27IN JJ41G

## (undated) DEVICE — MEDICINE CUP, GRADUATED, STER: Brand: MEDLINE

## (undated) DEVICE — CORONARY ARTERY BYPASS GRAFT MARKERS, STAINLESS STEEL, DISTAL, WITHOUT HOLDER: Brand: ANASTOMARK CORONARY ARTERY BYPASS GRAFT MARKERS, STAINLESS STEEL, DISTAL

## (undated) DEVICE — HEMOCONCENTRATOR PERFUS LPS06

## (undated) DEVICE — HARMONIC SYNERGY DISSECTING HOOK WITH TORQUE WRENCH. FOR USE WITH BLUE HAND PIECE ONLY: Brand: HARMONIC SYNERGY

## (undated) DEVICE — PK SUT OPN HEART POLLOCK CUST

## (undated) DEVICE — BG TRANSF W/COUPLER SPK 600ML

## (undated) DEVICE — SENSR CERBRL O2 PK/2

## (undated) DEVICE — GLV SURG BIOGEL SENSR LTX PF SZ7.5

## (undated) DEVICE — TBG INSUFFLATION LUER LOCK: Brand: MEDLINE INDUSTRIES, INC.